# Patient Record
Sex: FEMALE | Race: WHITE | HISPANIC OR LATINO | Employment: FULL TIME | URBAN - METROPOLITAN AREA
[De-identification: names, ages, dates, MRNs, and addresses within clinical notes are randomized per-mention and may not be internally consistent; named-entity substitution may affect disease eponyms.]

---

## 2017-02-28 ENCOUNTER — GENERIC CONVERSION - ENCOUNTER (OUTPATIENT)
Dept: OTHER | Facility: OTHER | Age: 32
End: 2017-02-28

## 2017-05-08 ENCOUNTER — GENERIC CONVERSION - ENCOUNTER (OUTPATIENT)
Dept: OTHER | Facility: OTHER | Age: 32
End: 2017-05-08

## 2017-05-08 ENCOUNTER — ALLSCRIPTS OFFICE VISIT (OUTPATIENT)
Dept: OTHER | Facility: OTHER | Age: 32
End: 2017-05-08

## 2017-05-08 DIAGNOSIS — E66.01 MORBID (SEVERE) OBESITY DUE TO EXCESS CALORIES (HCC): ICD-10-CM

## 2017-05-08 DIAGNOSIS — F33.2 MAJOR DEPRESSIVE DISORDER, RECURRENT SEVERE WITHOUT PSYCHOTIC FEATURES (HCC): ICD-10-CM

## 2017-05-09 ENCOUNTER — APPOINTMENT (OUTPATIENT)
Dept: LAB | Facility: CLINIC | Age: 32
End: 2017-05-09
Payer: COMMERCIAL

## 2017-05-09 ENCOUNTER — TRANSCRIBE ORDERS (OUTPATIENT)
Dept: LAB | Facility: CLINIC | Age: 32
End: 2017-05-09

## 2017-05-09 DIAGNOSIS — F33.2 MAJOR DEPRESSIVE DISORDER, RECURRENT SEVERE WITHOUT PSYCHOTIC FEATURES (HCC): ICD-10-CM

## 2017-05-09 DIAGNOSIS — E66.01 MORBID (SEVERE) OBESITY DUE TO EXCESS CALORIES (HCC): ICD-10-CM

## 2017-05-09 LAB
ALBUMIN SERPL BCP-MCNC: 3.2 G/DL (ref 3.5–5)
ALP SERPL-CCNC: 63 U/L (ref 46–116)
ALT SERPL W P-5'-P-CCNC: 27 U/L (ref 12–78)
ANION GAP SERPL CALCULATED.3IONS-SCNC: 7 MMOL/L (ref 4–13)
AST SERPL W P-5'-P-CCNC: 14 U/L (ref 5–45)
BILIRUB SERPL-MCNC: 0.4 MG/DL (ref 0.2–1)
BUN SERPL-MCNC: 13 MG/DL (ref 5–25)
CALCIUM SERPL-MCNC: 8.3 MG/DL (ref 8.3–10.1)
CHLORIDE SERPL-SCNC: 104 MMOL/L (ref 100–108)
CHOLEST SERPL-MCNC: 112 MG/DL (ref 50–200)
CO2 SERPL-SCNC: 30 MMOL/L (ref 21–32)
CREAT SERPL-MCNC: 0.69 MG/DL (ref 0.6–1.3)
ERYTHROCYTE [DISTWIDTH] IN BLOOD BY AUTOMATED COUNT: 12.1 % (ref 11.6–15.1)
EST. AVERAGE GLUCOSE BLD GHB EST-MCNC: 97 MG/DL
GFR SERPL CREATININE-BSD FRML MDRD: >60 ML/MIN/1.73SQ M
GLUCOSE P FAST SERPL-MCNC: 90 MG/DL (ref 65–99)
HBA1C MFR BLD: 5 % (ref 4.2–6.3)
HCT VFR BLD AUTO: 39.2 % (ref 34.8–46.1)
HDLC SERPL-MCNC: 47 MG/DL (ref 40–60)
HGB BLD-MCNC: 12.5 G/DL (ref 11.5–15.4)
LDLC SERPL CALC-MCNC: 53 MG/DL (ref 0–100)
MCH RBC QN AUTO: 30.6 PG (ref 26.8–34.3)
MCHC RBC AUTO-ENTMCNC: 31.9 G/DL (ref 31.4–37.4)
MCV RBC AUTO: 96 FL (ref 82–98)
PLATELET # BLD AUTO: 235 THOUSANDS/UL (ref 149–390)
PMV BLD AUTO: 10.1 FL (ref 8.9–12.7)
POTASSIUM SERPL-SCNC: 4.4 MMOL/L (ref 3.5–5.3)
PROT SERPL-MCNC: 7.4 G/DL (ref 6.4–8.2)
RBC # BLD AUTO: 4.09 MILLION/UL (ref 3.81–5.12)
SODIUM SERPL-SCNC: 141 MMOL/L (ref 136–145)
TRIGL SERPL-MCNC: 62 MG/DL
TSH SERPL DL<=0.05 MIU/L-ACNC: 0.75 UIU/ML (ref 0.36–3.74)
WBC # BLD AUTO: 7.68 THOUSAND/UL (ref 4.31–10.16)

## 2017-05-09 PROCEDURE — 80053 COMPREHEN METABOLIC PANEL: CPT

## 2017-05-09 PROCEDURE — 36415 COLL VENOUS BLD VENIPUNCTURE: CPT

## 2017-05-09 PROCEDURE — 83036 HEMOGLOBIN GLYCOSYLATED A1C: CPT

## 2017-05-09 PROCEDURE — 85027 COMPLETE CBC AUTOMATED: CPT

## 2017-05-09 PROCEDURE — 84443 ASSAY THYROID STIM HORMONE: CPT

## 2017-05-09 PROCEDURE — 80061 LIPID PANEL: CPT

## 2017-06-06 ENCOUNTER — GENERIC CONVERSION - ENCOUNTER (OUTPATIENT)
Dept: OTHER | Facility: OTHER | Age: 32
End: 2017-06-06

## 2017-06-13 ENCOUNTER — GENERIC CONVERSION - ENCOUNTER (OUTPATIENT)
Dept: OTHER | Facility: OTHER | Age: 32
End: 2017-06-13

## 2017-06-13 ENCOUNTER — ALLSCRIPTS OFFICE VISIT (OUTPATIENT)
Dept: OTHER | Facility: OTHER | Age: 32
End: 2017-06-13

## 2017-06-16 ENCOUNTER — HOSPITAL ENCOUNTER (EMERGENCY)
Facility: HOSPITAL | Age: 32
Discharge: HOME | End: 2017-06-16
Attending: EMERGENCY MEDICINE | Admitting: EMERGENCY MEDICINE
Payer: COMMERCIAL

## 2017-06-16 ENCOUNTER — APPOINTMENT (EMERGENCY)
Dept: RADIOLOGY | Facility: HOSPITAL | Age: 32
End: 2017-06-16
Payer: COMMERCIAL

## 2017-06-16 ENCOUNTER — APPOINTMENT (EMERGENCY)
Dept: CT IMAGING | Facility: HOSPITAL | Age: 32
End: 2017-06-16
Payer: COMMERCIAL

## 2017-06-16 VITALS
TEMPERATURE: 98.2 F | OXYGEN SATURATION: 98 % | BODY MASS INDEX: 72.28 KG/M2 | SYSTOLIC BLOOD PRESSURE: 125 MMHG | DIASTOLIC BLOOD PRESSURE: 59 MMHG | RESPIRATION RATE: 16 BRPM | WEIGHT: 293 LBS | HEART RATE: 95 BPM

## 2017-06-16 DIAGNOSIS — J45.901 ASTHMA EXACERBATION: Primary | ICD-10-CM

## 2017-06-16 LAB
ALBUMIN SERPL BCP-MCNC: 3.4 G/DL (ref 3.5–5)
ALP SERPL-CCNC: 63 U/L (ref 46–116)
ALT SERPL W P-5'-P-CCNC: 31 U/L (ref 12–78)
ANION GAP SERPL CALCULATED.3IONS-SCNC: 10 MMOL/L (ref 4–13)
AST SERPL W P-5'-P-CCNC: 15 U/L (ref 5–45)
ATRIAL RATE: 89 BPM
BASOPHILS # BLD AUTO: 0.02 THOUSANDS/ΜL (ref 0–0.1)
BASOPHILS NFR BLD AUTO: 0 % (ref 0–1)
BILIRUB SERPL-MCNC: 0.5 MG/DL (ref 0.2–1)
BUN SERPL-MCNC: 9 MG/DL (ref 5–25)
CALCIUM SERPL-MCNC: 8.7 MG/DL (ref 8.3–10.1)
CHLORIDE SERPL-SCNC: 102 MMOL/L (ref 100–108)
CLARITY, POC: CLEAR
CO2 SERPL-SCNC: 28 MMOL/L (ref 21–32)
COLOR, POC: YELLOW
CREAT SERPL-MCNC: 0.76 MG/DL (ref 0.6–1.3)
EOSINOPHIL # BLD AUTO: 0.24 THOUSAND/ΜL (ref 0–0.61)
EOSINOPHIL NFR BLD AUTO: 2 % (ref 0–6)
ERYTHROCYTE [DISTWIDTH] IN BLOOD BY AUTOMATED COUNT: 12.2 % (ref 11.6–15.1)
EXT BILIRUBIN, UA: NEGATIVE
EXT BLOOD URINE: NEGATIVE
EXT GLUCOSE, UA: NEGATIVE
EXT KETONES: NEGATIVE
EXT NITRITE, UA: NEGATIVE
EXT PH, UA: 7.5
EXT PROTEIN, UA: ABNORMAL
EXT SPECIFIC GRAVITY, UA: 1.01
EXT UROBILINOGEN: 0.2
GFR SERPL CREATININE-BSD FRML MDRD: >60 ML/MIN/1.73SQ M
GLUCOSE SERPL-MCNC: 93 MG/DL (ref 65–140)
HCT VFR BLD AUTO: 39.8 % (ref 34.8–46.1)
HGB BLD-MCNC: 12.8 G/DL (ref 11.5–15.4)
LIPASE SERPL-CCNC: 99 U/L (ref 73–393)
LYMPHOCYTES # BLD AUTO: 1.88 THOUSANDS/ΜL (ref 0.6–4.47)
LYMPHOCYTES NFR BLD AUTO: 16 % (ref 14–44)
MCH RBC QN AUTO: 30.6 PG (ref 26.8–34.3)
MCHC RBC AUTO-ENTMCNC: 32.2 G/DL (ref 31.4–37.4)
MCV RBC AUTO: 95 FL (ref 82–98)
MONOCYTES # BLD AUTO: 0.54 THOUSAND/ΜL (ref 0.17–1.22)
MONOCYTES NFR BLD AUTO: 5 % (ref 4–12)
NEUTROPHILS # BLD AUTO: 8.92 THOUSANDS/ΜL (ref 1.85–7.62)
NEUTS SEG NFR BLD AUTO: 77 % (ref 43–75)
P AXIS: 57 DEGREES
PLATELET # BLD AUTO: 246 THOUSANDS/UL (ref 149–390)
PMV BLD AUTO: 9.8 FL (ref 8.9–12.7)
POTASSIUM SERPL-SCNC: 3.9 MMOL/L (ref 3.5–5.3)
PR INTERVAL: 132 MS
PROT SERPL-MCNC: 8.4 G/DL (ref 6.4–8.2)
QRS AXIS: 39 DEGREES
QRSD INTERVAL: 84 MS
QT INTERVAL: 368 MS
QTC INTERVAL: 447 MS
RBC # BLD AUTO: 4.18 MILLION/UL (ref 3.81–5.12)
SODIUM SERPL-SCNC: 140 MMOL/L (ref 136–145)
T WAVE AXIS: 23 DEGREES
TROPONIN I SERPL-MCNC: <0.02 NG/ML
VENTRICULAR RATE: 89 BPM
WBC # BLD AUTO: 11.6 THOUSAND/UL (ref 4.31–10.16)
WBC # BLD EST: ABNORMAL 10*3/UL

## 2017-06-16 PROCEDURE — 94760 N-INVAS EAR/PLS OXIMETRY 1: CPT

## 2017-06-16 PROCEDURE — 84484 ASSAY OF TROPONIN QUANT: CPT | Performed by: PHYSICIAN ASSISTANT

## 2017-06-16 PROCEDURE — 83690 ASSAY OF LIPASE: CPT | Performed by: PHYSICIAN ASSISTANT

## 2017-06-16 PROCEDURE — 80053 COMPREHEN METABOLIC PANEL: CPT | Performed by: PHYSICIAN ASSISTANT

## 2017-06-16 PROCEDURE — 96361 HYDRATE IV INFUSION ADD-ON: CPT

## 2017-06-16 PROCEDURE — 96375 TX/PRO/DX INJ NEW DRUG ADDON: CPT

## 2017-06-16 PROCEDURE — 81002 URINALYSIS NONAUTO W/O SCOPE: CPT | Performed by: PHYSICIAN ASSISTANT

## 2017-06-16 PROCEDURE — 85025 COMPLETE CBC W/AUTO DIFF WBC: CPT | Performed by: PHYSICIAN ASSISTANT

## 2017-06-16 PROCEDURE — 94640 AIRWAY INHALATION TREATMENT: CPT

## 2017-06-16 PROCEDURE — 99285 EMERGENCY DEPT VISIT HI MDM: CPT

## 2017-06-16 PROCEDURE — 93005 ELECTROCARDIOGRAM TRACING: CPT | Performed by: PHYSICIAN ASSISTANT

## 2017-06-16 PROCEDURE — 71020 HB CHEST X-RAY 2VW FRONTAL&LATL: CPT

## 2017-06-16 PROCEDURE — 96365 THER/PROPH/DIAG IV INF INIT: CPT

## 2017-06-16 PROCEDURE — 94644 CONT INHLJ TX 1ST HOUR: CPT

## 2017-06-16 PROCEDURE — 71275 CT ANGIOGRAPHY CHEST: CPT

## 2017-06-16 PROCEDURE — 36415 COLL VENOUS BLD VENIPUNCTURE: CPT | Performed by: PHYSICIAN ASSISTANT

## 2017-06-16 RX ORDER — PREDNISONE 50 MG/1
50 TABLET ORAL DAILY
Qty: 4 TABLET | Refills: 0 | Status: SHIPPED | OUTPATIENT
Start: 2017-06-17 | End: 2017-10-01 | Stop reason: ALTCHOICE

## 2017-06-16 RX ORDER — ALBUTEROL SULFATE 2.5 MG/3ML
10 SOLUTION RESPIRATORY (INHALATION) ONCE
Status: COMPLETED | OUTPATIENT
Start: 2017-06-16 | End: 2017-06-16

## 2017-06-16 RX ORDER — ALBUTEROL SULFATE 1.25 MG/3ML
1 SOLUTION RESPIRATORY (INHALATION) EVERY 6 HOURS PRN
Qty: 75 ML | Refills: 0 | Status: SHIPPED | OUTPATIENT
Start: 2017-06-16 | End: 2018-01-25 | Stop reason: SINTOL

## 2017-06-16 RX ORDER — ACETAMINOPHEN 325 MG/1
975 TABLET ORAL ONCE
Status: COMPLETED | OUTPATIENT
Start: 2017-06-16 | End: 2017-06-16

## 2017-06-16 RX ORDER — METHYLPREDNISOLONE SODIUM SUCCINATE 125 MG/2ML
125 INJECTION, POWDER, LYOPHILIZED, FOR SOLUTION INTRAMUSCULAR; INTRAVENOUS ONCE
Status: COMPLETED | OUTPATIENT
Start: 2017-06-16 | End: 2017-06-16

## 2017-06-16 RX ORDER — MORPHINE SULFATE 2 MG/ML
2 INJECTION, SOLUTION INTRAMUSCULAR; INTRAVENOUS ONCE
Status: COMPLETED | OUTPATIENT
Start: 2017-06-16 | End: 2017-06-16

## 2017-06-16 RX ADMIN — MORPHINE SULFATE 2 MG: 2 INJECTION, SOLUTION INTRAMUSCULAR; INTRAVENOUS at 15:11

## 2017-06-16 RX ADMIN — IPRATROPIUM BROMIDE 0.5 MG: 0.5 SOLUTION RESPIRATORY (INHALATION) at 20:00

## 2017-06-16 RX ADMIN — IPRATROPIUM BROMIDE 0.5 MG: 0.5 SOLUTION RESPIRATORY (INHALATION) at 15:03

## 2017-06-16 RX ADMIN — SODIUM CHLORIDE 1000 ML: 0.9 INJECTION, SOLUTION INTRAVENOUS at 15:14

## 2017-06-16 RX ADMIN — ALBUTEROL SULFATE 2.5 MG: 2.5 SOLUTION RESPIRATORY (INHALATION) at 15:03

## 2017-06-16 RX ADMIN — IOHEXOL 100 ML: 350 INJECTION, SOLUTION INTRAVENOUS at 18:37

## 2017-06-16 RX ADMIN — ACETAMINOPHEN 975 MG: 325 TABLET, FILM COATED ORAL at 15:06

## 2017-06-16 RX ADMIN — MAGNESIUM SULFATE IN DEXTROSE 1 G: 10 INJECTION, SOLUTION INTRAVENOUS at 16:37

## 2017-06-16 RX ADMIN — IPRATROPIUM BROMIDE 0.5 MG: 0.5 SOLUTION RESPIRATORY (INHALATION) at 15:20

## 2017-06-16 RX ADMIN — ALBUTEROL SULFATE 10 MG: 2.5 SOLUTION RESPIRATORY (INHALATION) at 16:14

## 2017-06-16 RX ADMIN — METHYLPREDNISOLONE SODIUM SUCCINATE 125 MG: 125 INJECTION, POWDER, FOR SOLUTION INTRAMUSCULAR; INTRAVENOUS at 16:32

## 2017-06-27 ENCOUNTER — ALLSCRIPTS OFFICE VISIT (OUTPATIENT)
Dept: OTHER | Facility: OTHER | Age: 32
End: 2017-06-27

## 2017-07-17 ENCOUNTER — GENERIC CONVERSION - ENCOUNTER (OUTPATIENT)
Dept: OTHER | Facility: OTHER | Age: 32
End: 2017-07-17

## 2017-07-18 ENCOUNTER — ALLSCRIPTS OFFICE VISIT (OUTPATIENT)
Dept: OTHER | Facility: OTHER | Age: 32
End: 2017-07-18

## 2017-08-04 ENCOUNTER — GENERIC CONVERSION - ENCOUNTER (OUTPATIENT)
Dept: OTHER | Facility: OTHER | Age: 32
End: 2017-08-04

## 2017-08-07 ENCOUNTER — ALLSCRIPTS OFFICE VISIT (OUTPATIENT)
Dept: OTHER | Facility: OTHER | Age: 32
End: 2017-08-07

## 2017-08-07 ENCOUNTER — GENERIC CONVERSION - ENCOUNTER (OUTPATIENT)
Dept: OTHER | Facility: OTHER | Age: 32
End: 2017-08-07

## 2017-08-19 ENCOUNTER — HOSPITAL ENCOUNTER (EMERGENCY)
Facility: HOSPITAL | Age: 32
Discharge: HOME | End: 2017-08-19
Attending: EMERGENCY MEDICINE | Admitting: EMERGENCY MEDICINE
Payer: COMMERCIAL

## 2017-08-19 VITALS
HEART RATE: 72 BPM | BODY MASS INDEX: 70.01 KG/M2 | DIASTOLIC BLOOD PRESSURE: 66 MMHG | OXYGEN SATURATION: 100 % | WEIGHT: 293 LBS | TEMPERATURE: 98.5 F | SYSTOLIC BLOOD PRESSURE: 139 MMHG | RESPIRATION RATE: 16 BRPM

## 2017-08-19 DIAGNOSIS — N39.0 ACUTE UTI: Primary | ICD-10-CM

## 2017-08-19 LAB
CLARITY, POC: NORMAL
COLOR, POC: YELLOW
EXT BILIRUBIN, UA: NORMAL
EXT BLOOD URINE: NORMAL
EXT GLUCOSE, UA: NORMAL
EXT KETONES: NORMAL
EXT NITRITE, UA: NORMAL
EXT PH, UA: 7
EXT PROTEIN, UA: NORMAL
EXT SPECIFIC GRAVITY, UA: 1.01
EXT UROBILINOGEN: NORMAL
HCG UR QL: NORMAL
WBC # BLD EST: NORMAL 10*3/UL

## 2017-08-19 PROCEDURE — 99283 EMERGENCY DEPT VISIT LOW MDM: CPT

## 2017-08-19 PROCEDURE — 81025 URINE PREGNANCY TEST: CPT | Performed by: EMERGENCY MEDICINE

## 2017-08-19 PROCEDURE — 81002 URINALYSIS NONAUTO W/O SCOPE: CPT | Performed by: EMERGENCY MEDICINE

## 2017-08-19 PROCEDURE — 87186 SC STD MICRODIL/AGAR DIL: CPT | Performed by: EMERGENCY MEDICINE

## 2017-08-19 PROCEDURE — 87086 URINE CULTURE/COLONY COUNT: CPT | Performed by: EMERGENCY MEDICINE

## 2017-08-19 PROCEDURE — 87077 CULTURE AEROBIC IDENTIFY: CPT | Performed by: EMERGENCY MEDICINE

## 2017-08-19 RX ORDER — PHENAZOPYRIDINE HYDROCHLORIDE 100 MG/1
200 TABLET, FILM COATED ORAL ONCE
Status: COMPLETED | OUTPATIENT
Start: 2017-08-19 | End: 2017-08-19

## 2017-08-19 RX ORDER — CEPHALEXIN 250 MG/1
500 CAPSULE ORAL ONCE
Status: COMPLETED | OUTPATIENT
Start: 2017-08-19 | End: 2017-08-19

## 2017-08-19 RX ORDER — CEPHALEXIN 500 MG/1
500 CAPSULE ORAL 2 TIMES DAILY
Qty: 14 CAPSULE | Refills: 0 | Status: SHIPPED | OUTPATIENT
Start: 2017-08-19 | End: 2017-08-26

## 2017-08-19 RX ORDER — PHENAZOPYRIDINE HYDROCHLORIDE 200 MG/1
200 TABLET, FILM COATED ORAL 3 TIMES DAILY
Qty: 6 TABLET | Refills: 0 | Status: SHIPPED | OUTPATIENT
Start: 2017-08-19 | End: 2017-10-01 | Stop reason: ALTCHOICE

## 2017-08-19 RX ADMIN — CEPHALEXIN 500 MG: 250 CAPSULE ORAL at 01:13

## 2017-08-19 RX ADMIN — PHENAZOPYRIDINE HYDROCHLORIDE 200 MG: 100 TABLET ORAL at 01:13

## 2017-08-21 LAB — BACTERIA UR CULT: NORMAL

## 2017-09-01 ENCOUNTER — GENERIC CONVERSION - ENCOUNTER (OUTPATIENT)
Dept: OTHER | Facility: OTHER | Age: 32
End: 2017-09-01

## 2017-09-05 ENCOUNTER — GENERIC CONVERSION - ENCOUNTER (OUTPATIENT)
Dept: OTHER | Facility: OTHER | Age: 32
End: 2017-09-05

## 2017-09-19 ENCOUNTER — GENERIC CONVERSION - ENCOUNTER (OUTPATIENT)
Dept: OTHER | Facility: OTHER | Age: 32
End: 2017-09-19

## 2017-10-01 ENCOUNTER — HOSPITAL ENCOUNTER (EMERGENCY)
Facility: HOSPITAL | Age: 32
Discharge: HOME | End: 2017-10-01
Attending: EMERGENCY MEDICINE | Admitting: EMERGENCY MEDICINE
Payer: COMMERCIAL

## 2017-10-01 VITALS
DIASTOLIC BLOOD PRESSURE: 71 MMHG | TEMPERATURE: 98.5 F | WEIGHT: 293 LBS | HEART RATE: 80 BPM | HEIGHT: 64 IN | BODY MASS INDEX: 50.02 KG/M2 | SYSTOLIC BLOOD PRESSURE: 139 MMHG | RESPIRATION RATE: 16 BRPM | OXYGEN SATURATION: 97 %

## 2017-10-01 DIAGNOSIS — L03.90 CELLULITIS: Primary | ICD-10-CM

## 2017-10-01 PROCEDURE — 99283 EMERGENCY DEPT VISIT LOW MDM: CPT

## 2017-10-01 RX ORDER — FLUOXETINE HYDROCHLORIDE 40 MG/1
40 CAPSULE ORAL DAILY
COMMUNITY
End: 2018-01-25 | Stop reason: SINTOL

## 2017-10-01 RX ORDER — CEPHALEXIN 250 MG/1
500 CAPSULE ORAL 4 TIMES DAILY
Qty: 80 CAPSULE | Refills: 0 | Status: SHIPPED | OUTPATIENT
Start: 2017-10-01 | End: 2017-10-11

## 2017-10-01 RX ORDER — CEPHALEXIN 250 MG/1
500 CAPSULE ORAL ONCE
Status: COMPLETED | OUTPATIENT
Start: 2017-10-01 | End: 2017-10-01

## 2017-10-01 RX ADMIN — CEPHALEXIN 500 MG: 250 CAPSULE ORAL at 15:15

## 2017-10-01 NOTE — ED PROVIDER NOTES
History  Chief Complaint   Patient presents with    Nasal Swelling     Pt presents to ED due to c/o nasal swelling, pain and erythema starting Thursday  Denies using pore strips, sprays, etc      Patient is a 20-year-old female who reports to the emergency department with swelling to the tip of her nose and redness that started on Thursday  No systemic fevers, chills, sweats  The pain also extends to the inside of her nose  No abnormalities on the inside of the nose but the tip does have some redness and swelling  This is either cellulitis or a pimple that is deep  Will treat his cellulitis and have the patient follow up with her primary care doctor  Patient has been given strict return precautions  Otherwise normal HEENT exam             Prior to Admission Medications   Prescriptions Last Dose Informant Patient Reported? Taking? FLUoxetine (PROzac) 40 MG capsule   Yes Yes   Sig: Take 40 mg by mouth daily   Suvorexant (BELSOMRA) 20 MG TABS   Yes Yes   Sig: Take 20 mg by mouth daily as needed   albuterol (ACCUNEB) 1 25 MG/3ML nebulizer solution   No No   Sig: Take 3 mL by nebulization every 6 (six) hours as needed for wheezing   albuterol (PROVENTIL HFA,VENTOLIN HFA) 90 mcg/act inhaler   Yes No   Sig: Inhale 2 puffs every 6 (six) hours as needed for wheezing  fexofenadine (ALLEGRA) 30 MG tablet   Yes No   Sig: Take 30 mg by mouth 2 (two) times a day  Facility-Administered Medications: None       Past Medical History:   Diagnosis Date    Asthma     Chronic pain     Depression     Insomnia     Seasonal allergies        Past Surgical History:   Procedure Laterality Date    NO PAST SURGERIES         History reviewed  No pertinent family history  I have reviewed and agree with the history as documented      Social History   Substance Use Topics    Smoking status: Never Smoker    Smokeless tobacco: Never Used    Alcohol use No        Review of Systems   Constitutional: Negative for chills and fever    HENT: Negative for ear discharge and facial swelling  Nose pain   Respiratory: Negative for chest tightness, shortness of breath and wheezing  Cardiovascular: Negative for chest pain and palpitations  Gastrointestinal: Negative for abdominal pain, diarrhea and vomiting  Genitourinary: Negative for dysuria and hematuria  Musculoskeletal: Negative for arthralgias and neck stiffness  Skin: Positive for color change  Negative for rash  Allergic/Immunologic: Negative  Neurological: Negative for tremors, seizures and syncope  Psychiatric/Behavioral: Negative for hallucinations and suicidal ideas  All other systems reviewed and are negative  Physical Exam  ED Triage Vitals [10/01/17 1303]   Temperature Pulse Respirations Blood Pressure SpO2   98 5 °F (36 9 °C) 84 16 157/80 96 %      Temp Source Heart Rate Source Patient Position - Orthostatic VS BP Location FiO2 (%)   Oral Monitor Sitting Right arm --      Pain Score       4           Physical Exam   Constitutional: She is oriented to person, place, and time  She appears well-developed and well-nourished  HENT:   Head: Normocephalic and atraumatic  Right Ear: External ear normal    Left Ear: External ear normal    Eyes: Conjunctivae and EOM are normal    Neck: Normal range of motion  Neck supple  No JVD present  No tracheal deviation present  Cardiovascular: Normal rate, regular rhythm and normal heart sounds  Pulmonary/Chest: Effort normal  No respiratory distress  She has no wheezes  She has no rales  Abdominal: Soft  Bowel sounds are normal  There is no tenderness  There is no rebound and no guarding  Musculoskeletal: She exhibits no edema or tenderness  Neurological: She is alert and oriented to person, place, and time  Skin: Skin is warm and dry  No rash noted  No erythema  Psychiatric: She has a normal mood and affect  Thought content normal    Nursing note and vitals reviewed        ED Medications  Medications   cephalexin (KEFLEX) capsule 500 mg (500 mg Oral Given 10/1/17 1515)       Diagnostic Studies  Labs Reviewed - No data to display    No orders to display       Procedures  Procedures      Phone Contacts  ED Phone Contact    ED Course  ED Course                   The patient (and any family present) verbalized understanding of the discharge instructions and warnings that would necessitate return to the Emergency Department  All questions were answered prior to discharge  MDM  CritCare Time    Disposition  Final diagnoses:   Cellulitis     ED Disposition     ED Disposition Condition Comment    Discharge  [address] discharge to home/self care  Condition at discharge: Good        Follow-up Information     Follow up With Specialties Details Why 2605 Tevin Burciaga, DO Internal Medicine In 1 day  [address]  [address] [facility], [address] [place] [address],[address]  [phone]          Discharge Medication List as of 10/1/2017  2:36 PM      START taking these medications    Details   cephalexin (KEFLEX) 250 mg capsule Take 2 capsules by mouth 4 (four) times a day for 10 days, Starting Sun 10/1/2017, Until Wed 10/11/2017, Print         CONTINUE these medications which have NOT CHANGED    Details   FLUoxetine (PROzac) 40 MG capsule Take 40 mg by mouth daily, Historical Med      Suvorexant (BELSOMRA) 20 MG TABS Take 20 mg by mouth daily as needed, Historical Med      albuterol (ACCUNEB) 1 25 MG/3ML nebulizer solution Take 3 mL by nebulization every 6 (six) hours as needed for wheezing, Starting Fri 6/16/2017, Print      albuterol (PROVENTIL HFA,VENTOLIN HFA) 90 mcg/act inhaler Inhale 2 puffs every 6 (six) hours as needed for wheezing , Until Discontinued, Historical Med      fexofenadine (ALLEGRA) 30 MG tablet Take 30 mg by mouth 2 (two) times a day , Until Discontinued, Historical Med           No discharge procedures on file      ED Provider  Electronically Signed by Aleida Esquivel MD  10/01/17 8199

## 2017-10-01 NOTE — DISCHARGE INSTRUCTIONS
Cellulitis, Ambulatory Care   GENERAL INFORMATION:   Cellulitis  is a skin infection caused by bacteria  Common symptoms include the following:   · Fever    · A red, warm, swollen area on your skin    · Pain when the area is touched    · Bumps or blisters (abscess) that may drain pus    · Bumpy, raised skin that feels like an orange peel  Seek immediate care for the following symptoms:   · An increase in pain, redness, warmth, and size    · Red streaks coming from the infected area    · A thin, gray-brown discharge coming from your infected skin area    · A crackling under your skin when you touch it    · Purple dots or bumps on your skin, or bleeding under your skin    · New swelling and pain in your legs    · Sudden trouble breathing or chest pain  Treatment for cellulitis  may include medicines to treat the bacterial infection or decrease pain  The infection may need to be cleaned out  Damaged, dead, or infected tissue may need to be cut away to help your wound heal   Manage your symptoms:   · Elevate your wound above the level of your heart  as often as you can  This will help decrease swelling and pain  Prop your wound on pillows or blankets to keep it elevated comfortably  · Clean your wound as directed  You may need to wash the wound with soap and water  Look for signs of infection  · Wear pressure stockings as directed  The stockings are tight and put pressure on your legs  This improves blood flow and decreases swelling  Prevent cellulitis:   · Wash your hands often  Use soap and water  Wash your hands after you use the bathroom, change a child's diapers, or sneeze  Wash your hands before you prepare or eat food  Use lotion to prevent dry, cracked skin  · Do not share personal items, such as towels, clothing, and razors  · Clean exercise equipment  with germ-killing  before and after you use it    Follow up with your healthcare provider as directed:  Write down your questions so you remember to ask them during your visits  CARE AGREEMENT:   You have the right to help plan your care  Learn about your health condition and how it may be treated  Discuss treatment options with your caregivers to decide what care you want to receive  You always have the right to refuse treatment  The above information is an  only  It is not intended as medical advice for individual conditions or treatments  Talk to your doctor, nurse or pharmacist before following any medical regimen to see if it is safe and effective for you  © 2014 [address] is for End User's use only and may not be sold, redistributed or otherwise used for commercial purposes  All illustrations and images included in CareNotes® are the copyrighted property of [employer]  or [employer]

## 2017-10-05 ENCOUNTER — GENERIC CONVERSION - ENCOUNTER (OUTPATIENT)
Dept: OTHER | Facility: OTHER | Age: 32
End: 2017-10-05

## 2017-10-17 ENCOUNTER — GENERIC CONVERSION - ENCOUNTER (OUTPATIENT)
Dept: OTHER | Facility: OTHER | Age: 32
End: 2017-10-17

## 2017-10-23 ENCOUNTER — GENERIC CONVERSION - ENCOUNTER (OUTPATIENT)
Dept: OTHER | Facility: OTHER | Age: 32
End: 2017-10-23

## 2017-12-15 ENCOUNTER — GENERIC CONVERSION - ENCOUNTER (OUTPATIENT)
Dept: OTHER | Facility: OTHER | Age: 32
End: 2017-12-15

## 2018-01-09 NOTE — MISCELLANEOUS
Provider Comments  Provider Comments:     Dear Laura Arechiga had a scheduled appointment at our office today but were unable to keep  We attempted to call you back but were unable to reach you  Please give our office a call if you wish to reschedule  Sincerely,     [facility] [place]          Signatures   Electronically signed by : Queen Melinda, ; Apr 29 2016 10:20AM EST                       (Author)    Electronically signed by :  GERBER Fowler ; May  5 2016  1:26PM EST

## 2018-01-09 NOTE — MISCELLANEOUS
Provider Comments  Provider Comments:   Dear Mina Pennington,    We called you about your scheduled appointment for today but were unable to reach you  It is very important that you follow up with us so that we can assess your physical and nutritional safety  Please call our office at [phone] to reschedule your appointment       Sincerely,     [facility]        Signatures   Electronically signed by : Satish Marcelo, ; May 23 2016 10:45AM EST                       (Author)    Electronically signed by : GERBER Perez ; May 23 2016 11:50AM EST                       (Co-author)

## 2018-01-10 NOTE — PSYCH
Behavioral Health Outpatient Intake    Referred By: DR Garret Ricardo  Intake Questions: there are no developmental disabilities  the patient does not have a hearing impairment  the patient does not have an ICM or CTT  patient is not taking injectable psychiatric medications  Employment: The patient is employed full time at [employer]  Emergency Contact Information:   Emergency Contact: WILLIAM JOHNS   Relationship to Patient: MOTHER   Phone Number: [phone]   Previous Psychiatric Treatment: She has previously been seen by a psychiatrist  2-3 YRS  She has previously been seen by a therapist  2-3 YRS   History: no  service  She has not had combat service  Insurance Subscriber: Macarena Prajapati   Primary Insurance: [employer]   ID number: [id number]   Group number: [id number]         Presenting Problem (in patient's words): DEPRESSION, ANXIETY  Substance Abuse: NONE  Previous Treatment: The patient has not been seen here in the past      Accepted as Patient   SIDRA JONES 6/27/17 4PM     Primary Care Physician: DR Garret Ricardo       Signatures   Electronically signed by : Marlyn Phelan, ; May  8 2017  2:23PM EST                       (Author)

## 2018-01-10 NOTE — PROGRESS NOTES
Assessment    1  Left lumbar radiculopathy (724 4) (M54 16)   2  Asthma (837 90) (J45 909)    Plan  Asthma    · Follow-up visit in 1 year Evaluation and Treatment  Wellness  Status: Hold For - Scheduling   Requested for: [id number]  Left lumbar radiculopathy    · TraMADol HCl - 50 MG Oral Tablet; TAKE 1 TABLET QHS PRN    Discussion/Summary  Discussion Summary:     1  left lumbar radiculopathy - suspect sciatica  She is scheduled for MRI per her chiropractor  Discussed pain regimen to include gabapentin, medrol dose kari and Tramadol  She prefers Tramadol, to be taken for severe pain  If not effective, she will consider steroid taper  Follow up as needed  She will forward a copy of her mri results  2  intermittent asthma - stable  Her triggers include pollen, smoke, cold weather and exercise  She uses ventolin inh prn  She is up to date with her influenza vaccine  3  morbid obesity - BMI decreased to 63 with diet and exercise  She has no known complications thus far  She has been [facility] to [facility]  Chief Complaint  Chief Complaint Chronic Condition [facility]: Patient is here today for follow up of chronic conditions described in HPI  History of Present Illness  HPI: 30yo female with morbid obesity and asthma here for follow up care and to discuss labs  She has lost about 15pounds over two weeks by modifying her diet and increasing her physical activity  She has been eating more fish and chicken and stopped drinking soda and juice  She is drinking a lot of water instead  She has returned to the gym and increased her walking, doing gabriel, no weightlifting  She has had radiating pain in her left buttock down to her left leg since  Pain is located in her left buttock as if she is being kicked, initially as warm sensation then goes down to her left leg  She has not had this pain before  She is scheduled for MRI tomorrow  She is seeing a chiropractor Dr Mario Brine   She has taken ibuprofen 800mg twice daily without relief  She denies urinary or bowel changes or fever  States she is going to [employer] on wednesday  Asthma (Follow-Up): The patient is being seen for a routine clinic follow-up of asthma  The patient's long-term asthma pattern has been classified as intermittent  The patient states she has been doing well with her asthma control since the last visit  Asthma Control: Well controlled  Interval Symptoms: The patient is currently asymptomatic  Interval Triggers: cold weather, exercise, pollen exposure and smoke  Medications: a short acting beta agonist agent the patient is adherent with her medication regimen  She denies medication side effects  Review of Systems  Complete-Female:   Constitutional: recent weight loss, but no fever  Cardiovascular: No complaints of slow heart rate, no fast heart rate, no chest pain, no palpitations, no leg claudication, no lower extremity edema  Respiratory: as noted in HPI  Gastrointestinal: No complaints of abdominal pain, no constipation, no nausea or vomiting, no diarrhea, no bloody stools  Genitourinary: No complaints of dysuria, no incontinence, no pelvic pain, no dysmenorrhea, no vaginal discharge or bleeding  Musculoskeletal: as noted in HPI  Neurological: numbness and tingling  Active Problems    1  Asthma (493 90) (J45 909)   2  Edema (782 3) (R60 9)   3  Headache (784 0) (R51)   4  Inflammatory disease of cervix, vagina, and vulva (616 9) (N73 9)   5  Morbid or severe obesity due to excess calories (278 01) (E66 01)   6  Palpitations (785 1) (R00 2)   7  Screening for cardiovascular condition (V81 2) (Z13 6)   8  Screening for depression (V79 0) (Z13 89)   9  Screening for diabetes mellitus (DM) (V77 1) (Z13 1)   10  Screening for thyroid disorder (V77 0) (Z13 29)   11  Shortness of breath (786 05) (R06 02)   12  Varicose Veins Of Lower Extremities (454 9)    Past Medical History    1   History of Obesity surgery status (V45 86) (Z98 84)  Active Problems And Past Medical History Reviewed: The active problems and past medical history were reviewed and updated today  Surgical History    1  History of Incision And Drainage Of Skin Abscess  Surgical History Reviewed: The surgical history was reviewed and updated today  Family History    1  Family history of cardiac disorder (V17 49) (Z82 49)   2  Family history of hypertension (V17 49) (Z82 49)    3  Family history of diabetes mellitus (V18 0) (Z83 3)  Family History Reviewed: The family history was reviewed and updated today  Social History    · Alcohol use (V49 89) (F10 99)   · Former smoker (J86 97) (F37 522)   · Occupation   · Single  Social History Reviewed: The social history was reviewed and is unchanged  Current Meds   1  Ventolin  (90 Base) MCG/ACT Inhalation Aerosol Solution; Therapy: (Recorded:11Jan2016) to Recorded  Medication List Reviewed: The medication list was reviewed and updated today  Allergies    1  No Known Drug Allergies    2  Dust   3  Grass   4  Pollen   5  Ragweed    Vitals  Vital Signs [Data Includes: Current Encounter]    Recorded: [id number] 02:09PM   Temperature 98 1 F   Heart Rate 59   Respiration 14   Systolic 685   Diastolic 68   Height 5 ft 5 in   Weight 383 lb 6 08 oz   BMI Calculated 63 8   BSA Calculated 2 61   O2 Saturation 99     Physical Exam    Constitutional   General appearance: No acute distress, well appearing and well nourished  Pulmonary   Respiratory effort: No increased work of breathing or signs of respiratory distress  Auscultation of lungs: Clear to auscultation  Cardiovascular   Auscultation of heart: Normal rate and rhythm, normal S1 and S2, without murmurs  Musculoskeletal   Gait and station: Normal   motor strength equal BLE     Psychiatric   Orientation to person, place, and time: Normal          Results/Data  Results   (1) HEMOGLOBIN A1C [id number] 07:50PM [facility] TW Order Number: [id number]      5 7-6 4% impaired fasting glucose  >=6 5% diagnosis of diabetes    Falsely low levels are seen in conditions linked to short RBC life span-  hemolytic anemia, and splenomegaly  Falsely elevated levels are seen in situations where there is an increased production of RBC- receipt of erythropoietin or blood transfusions  Adopted from ADA-Clinical Practice Recommendations     Test Name Result Flag Reference   HEMOGLOBIN A1C 4 7 %  4 0-5 6   EST  AVG  GLUCOSE 88 mg/dl       (1) TSH WITH FT4 REFLEX 12Jan2016 02:18PM [facility]   TW Order Number: [id number]    Patients undergoing fluorescein dye angiography may retain small amounts of fluorescein in the body for 48-72 hours post procedure  Samples containing fluorescein can produce falsely depressed TSH values  If the patient had this procedure,a specimen should be resubmitted post fluorescein clearance  The recommended reference ranges for TSH during pregnancy are as follows:  First trimester 0 1 to 2 5 uIU/mL  Second trimester  0 2 to 3 0 uIU/mL  Third trimester 0 3 to 3 0 uIU/m     Test Name Result Flag Reference   TSH 1 276 uIU/mL  0 358-3 740     (1) COMPREHENSIVE METABOLIC PANEL [id number] 22:05BN Mina Morillo Order Number: [id number]      National Kidney Disease Education Program recommendations are as follows:  GFR calculation is accurate only with a steady state creatinine  Chronic Kidney disease less than 60 ml/min/1 73 sq  meters  Kidney failure less than 15 ml/min/1 73 sq  meters  Test Name Result Flag Reference   GLUCOSE,RANDM 76 mg/dL     If the patient is fasting, the ADA then defines impaired fasting glucose as > 100 mg/dL and diabetes as > or equal to 123 mg/dL     SODIUM 138 mmol/L  136-145   POTASSIUM 3 9 mmol/L  3 5-5 3   CHLORIDE 102 mmol/L  100-108   CARBON DIOXIDE 28 mmol/L  21-32   ANION GAP (CALC) 8 mmol/L  4-13   BLOOD UREA NITROGEN 14 mg/dL  5-25   CREATININE 0 66 mg/dL  0 60-1 30 Standardized to IDMS reference method   CALCIUM 8 3 mg/dL  8 3-10 1   BILI, TOTAL 0 48 mg/dL  0 20-1 00   ALK PHOSPHATAS 60 U/L     ALT (SGPT) 35 U/L  12-78   AST(SGOT) 23 U/L  5-45   ALBUMIN 3 3 g/dL L 3 5-5 0   TOTAL PROTEIN 7 6 g/dL  6 4-8 2   eGFR Non-African American > ml/min/1 73sq m       (1) URINALYSIS (will reflex a microscopy if leukocytes, occult blood, protein or nitrites are not within normal limits) [id number] 01:50PM Geoffrey Galeazzi    Order Number: [id number]     Test Name Result Flag Reference   COLOR Yellow     CLARITY Clear     SPECIFIC GRAVITY UA 1 020  1 003-1 030   PH UA 5 5  4 5-8 0   LEUKOCYTE ESTERASE UA Negative  Negative   NITRITE UA Negative  Negative   PROTEIN UA Negative mg/dl  Negative, >300   GLUCOSE UA Negative mg/dl  Negative   KETONES UA Negative mg/dl  Negative   UROBILINOGEN UA 0 2 E U /dl  0 2, 1 0 E U /dl   BILIRUBIN UA Negative  Negative   BLOOD UA Negative  Negative     (1) LIPID PANEL FASTING W DIRECT LDL REFLEX 12Jan2016 01:44PM Declan Galeazzi    Order Number: [id number]      Triglyceride:         Normal              <150 mg/dl       Borderline High    150-199 mg/dl       High               200-499 mg/dl       Very High          >499 mg/dl  Cholesterol:         Desirable        <200 mg/dl      Borderline High  200-239 mg/dl      High             >239 mg/dl  HDL Cholesterol:        High    >59 mg/dL      Low     <41 mg/dL  LDL Cholesterol:        Optimal          <100 mg/dl         Near Optimal     100-129 mg/dl        Above Optimal          Borderline High   130-159 mg/dl          High              160-189 mg/dl          Very High        >189 mg/dl  LDL CALCULATED:    This screening LDL is a calculated result  It does not have the accuracy of the Direct Measured LDL in the monitoring of patients with hyperlipidemia and/or statin therapy  Direct Measure LDL (URU630) must be ordered separately in these patients       Test Name Result Flag Reference   CHOLESTEROL 114 mg/dL     LDL CHOLESTEROL CALCULATED 48 mg/dL  0-100   TRIGLYCERIDES 94 mg/dL  <=150   HDL,DIRECT 47 mg/dL  40-60     (1) CBC/ PLT (NO DIFF) [id number] 01:08PM Jimenez Hall   TW Order Number: [id number]     Order Number: [id number]     Test Name Result Flag Reference   HEMATOCRIT 38 0 %  34 8-46 1   HEMOGLOBIN 12 1 g/dL  11 5-15 4   MCHC 31 8 g/dL  31 4-37 4   MCH 30 4 pg  26 8-34 3   MCV 95 5 fL  82 0-98 0   PLATELET COUNT 759 Thousands/uL  149-390   RBC COUNT 3 98 Million/uL  3 81-5 12   RDW 11 9 %  11 6-15 1   WBC COUNT 8 14 Thousand/uL  4 31-10 16   MPV 11 0 fL  8 9-12 7     PHQ-2 Adult Depression Screening 11Jan2016 10:55AM Jimenez Hall     Test Name Result Flag Reference   PHQ-2 Adult Depression Score 0     Q1: 0, Q2: 0   PHQ-2 Adult Depression Screening Negative       Future Appointments    Date/Time Provider Specialty Site   01/30/2017 03:30 PM Jimenez Hall DO Internal Medicine [facility] INTERNAL MED     Signatures   Electronically signed by : Zunilda Banegas DO; Jan 25 2016  2:58PM EST                       (Author)

## 2018-01-11 ENCOUNTER — GENERIC CONVERSION - ENCOUNTER (OUTPATIENT)
Dept: INTERNAL MEDICINE CLINIC | Facility: CLINIC | Age: 33
End: 2018-01-11

## 2018-01-11 NOTE — PROGRESS NOTES
History of Present Illness  Bariatric MNT [facility] Screening Preop [facility]:     She was on time  Her surgeon is Dr Kimber Badillo  The patient was present at the session  The diagnosis/reason for the appointment is: She has a BMI of 71 and will need to lose 10% of her ABW  Exercise Frequency:  She does not exercise  Relationship to food: She is an emotional eater, is a stress eater and eats large portions  She does not know the difference between being comfortably full and uncomfortably full and does not know the difference between being stuffed and comfortably full  She felt/thought they felt her best at 200 lbs, size L-XL pounds she last weighed this unknown    She completed a food journal (works night shift 4 nights a week  drinks 16oz juice daily, 20oz soda 3-4 times per week) 24-Hour Food Recall Breakfast 3 eggs, 4-5 slices boswell, 2 slices toast with butter  Lunch: [employer] burger, french fries, apple pies, large sweet tea  Dinner: rice and beans from home (3-4 cups) or orders outs 4 slices of pizza  She drinks 1 galloon daily cups of water daily Her motivators are:does not want to loose her independence  Her obesity/being overweight is related to excessive energy intakes, sedentary lifestyle and is evidenced by: BMI 71  Knowledge Deficit Prior to Education  She previously completed pre-op program twice  Barriers to education:  She has no barriers to education  Medical Nutrition Therapy Intervention: Individualized Meal Plan, Daily Food /Exercise Diary, Lifestyle /Behavior Modification Techniques, Basic Pathophysiology of Obesity, Label Reading, Checklist of the Overview of Lesson Plans, Surgical changes to Stomach/GI and Food/Medication Interactions  Area's Reviewed: Post - surgery goal weight, Web forum, Lifestyle Thor K[employer] Modification Techniques, [employer] in [employer] ( hand out for [employer]), Pre- surgery goals /rules and Appetite Awareness     Brief Review of Vitamins, diet progression for post-op and Pathophysiology of Obesity  Her comprehension of the presented material was very good  Her receptivity to the presented material was very good  Her motivation was very good  Provided: Nutrition Guidelines for Gastric Surgery, Food Journaling Application handout, Bariatric Program Pre- Surgery Nutrition and Goals  Patient is a 28year old who is here for nutritional evaluation for weight loss surgery  I reviewed comorbidities and medications prior to session  She first recalls having problems with weight gain at the age of 3   Patient feels that her weight gain over the past 16 months is due to eating large portions and stress in her life  She has dieted in the past with variable success but would regain the weight back  (refer to diet history for details)  For her personal pre-operative goals she will: 1  measure and reduce portion sizes 2  eliminate sugary beverage intakes (ie sweet tea, soda, juice, etc) 3  return to food journaling at least 3 days per week She will complete all 6 lesson plans in her bariatric booklet  She was instructed on the importance of consistent vitamin and mineral intake after her surgery to prevent deficiencies  She currently does not take any vitamin or mineral supplements   Recommended that she take an adult multivitamin/mineral plus 2000 IU of vitamin D3  Reviewed importance of support after surgery and discussed the web forum, gordon, pep rally and support group  Patient states adequate knowledge of nutrition, exercise and behavior modification required for long term success  Pt  is recommended for surgery and is aware that she will be required to follow the 2 week pre operative liver shrinking diet prior to surgery  Pt also understands that she needs to implement lifestyle changes in preparation for surgery  Patient is aware that she has 6 months of weigh ins required by her insurance  Active Problems    1   Asthma (493 90) (J45 909)   2  Edema (782 3) (R60 9)   3  Headache (784 0) (R51)   4  Inflammatory disease of cervix, vagina, and vulva (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 9) (N73 9)   5  Left lumbar radiculopathy (724 4) (M54 16)   6  Major depressive disorder, recurrent episode, severe (296 33) (F33 2)   7  Morbid or severe obesity due to excess calories (278 01) (E66 01)   8  Palpitations (785 1) (R00 2)   9  Screening for cardiovascular condition (V81 2) (Z13 6)   10  Screening for depression (V79 0) (Z13 89)   11  Screening for diabetes mellitus (DM) (V77 1) (Z13 1)   12  Screening for thyroid disorder (V77 0) (Z13 29)   13  Shortness of breath (786 05) (R06 02)   14  Varicose Veins Of Lower Extremities (454 9)    Past Medical History    1  History of depression (V11 8) (Z86 59)    Surgical History    1  History of Incision And Drainage Of Skin Abscess    Family History  Father    1  Family history of cardiac disorder (V17 49) (Z82 49)   2  Family history of hypertension (V17 49) (Z82 49)  Maternal Grandmother    3  Family history of diabetes mellitus (V18 0) (Z83 3)    Social History    · Denied: History of Alcohol use   · Denied: History of Drug use   · Former smoker (V15 82) (C03 275)   · Occupation   · Single    Current Meds   1  Allegra Allergy 60 MG Oral Tablet; Therapy: 79BXU1452 to Recorded   2  FLUoxetine HCl - 10 MG Oral Capsule; TAKE 1 CAPSULE BY MOUTH EVERYDAY; Therapy: 24FQJ9437 to (Evaluate:76Vhj4217)  Requested for: 12Jun2017; Last   Rx:12Jun2017 Ordered   3  FLUoxetine HCl - 10 MG Oral Tablet; TAKE ONE (1) TABLET(S) DAILY; Therapy: 43FHA9782 to (Evaluate:07Jun2017)  Requested for: 53LIX6696; Last   CP:14NTR0246 Ordered   4  Nexplanon 68 MG Subcutaneous Implant; Therapy: (Recorded:84Zlw1091) to Recorded   5  Ventolin  (90 Base) MCG/ACT Inhalation Aerosol Solution; Therapy: (Recorded:11Jan2016) to Recorded    Allergies    1  No Known Drug Allergies    2  Dust   3  Grass   4  Pollen   5  Ragweed   6   Seasonal    Vitals  Signs Recorded: 90Tgf1277 09:17AM   Height: 5 ft 4 25 in  Weight: 416 lb 8 0 oz  BMI Calculated: 70 93  BSA Calculated: 2 68    Future Appointments    Date/Time Provider Specialty Site   06/27/2017 04:00 PM Nicola Cade, LCSW Psychiatry [facility]   06/13/2017 04:00 PM Kimmie Poole, DO Internal Medicine [facility]     Signatures   Electronically signed by : MAURO Saleh; Jun 13 2017  9:22AM EST                       (Author)    Electronically signed by :  GERBER Mo ; Jun 23 2017  1:01PM EST

## 2018-01-11 NOTE — PSYCH
Treatment Plan Tracking    #1 Treatment Plan not completed within required time limits due to: Client presented with emotional/behavioral issues that required clinical intervention            Signatures   Electronically signed by : Bassam Dewey LCSW; Sep  1 2017 10:08AM EST                       (Author)

## 2018-01-12 VITALS
SYSTOLIC BLOOD PRESSURE: 130 MMHG | HEIGHT: 65 IN | TEMPERATURE: 98.5 F | HEART RATE: 71 BPM | RESPIRATION RATE: 14 BRPM | BODY MASS INDEX: 48.82 KG/M2 | DIASTOLIC BLOOD PRESSURE: 72 MMHG | WEIGHT: 293 LBS

## 2018-01-12 VITALS — BODY MASS INDEX: 50.02 KG/M2 | WEIGHT: 293 LBS | HEIGHT: 64 IN

## 2018-01-12 NOTE — RESULT NOTES
Verified Results  (1) CBC/ PLT (NO DIFF) 99XWY6173 12:22PM Carmen Galvan Order Number: [id number]     Order Number: [id number]     Test Name Result Flag Reference   HEMATOCRIT 36 9 %  34 8-46 1   HEMOGLOBIN 11 7 g/dL  11 5-15 4   MCHC 31 7 g/dL  31 4-37 4   MCH 30 3 pg  26 8-34 3   MCV 96 fL  82-98   PLATELET COUNT 649 Thousands/uL  149-390   RBC COUNT 3 86 Million/uL  3 81-5 12   RDW 11 7 %  11 6-15 1   WBC COUNT 6 18 Thousand/uL  4 31-10 16   MPV 10 4 fL  8 9-12 7     (1) COMPREHENSIVE METABOLIC PANEL 12KUL5048 16:49HE Carmen Galvan Order Number: [id number]      National Kidney Disease Education Program recommendations are as follows:  GFR calculation is accurate only with a steady state creatinine  Chronic Kidney disease less than 60 ml/min/1 73 sq  meters  Kidney failure less than 15 ml/min/1 73 sq  meters  Test Name Result Flag Reference   GLUCOSE,RANDM 87 mg/dL     If the patient is fasting, the ADA then defines impaired fasting glucose as > 100 mg/dL and diabetes as > or equal to 123 mg/dL     SODIUM 138 mmol/L  136-145   POTASSIUM 3 9 mmol/L  3 5-5 3   CHLORIDE 107 mmol/L  100-108   CARBON DIOXIDE 26 mmol/L  21-32   ANION GAP (CALC) 5 mmol/L  4-13   BLOOD UREA NITROGEN 11 mg/dL  5-25   CREATININE 0 56 mg/dL L 0 60-1 30   Standardized to IDMS reference method   CALCIUM 7 7 mg/dL L 8 3-10 1   BILI, TOTAL 0 34 mg/dL  0 20-1 00   ALK PHOSPHATAS 67 U/L     ALT (SGPT) 39 U/L  12-78   AST(SGOT) 20 U/L  5-45   ALBUMIN 3 0 g/dL L 3 5-5 0   TOTAL PROTEIN 6 9 g/dL  6 4-8 2   eGFR Non-African American      >60 0 ml/min/1 73sq m     (1) VITAMIN B12 28Mar2016 12:22PM Carlyn Kumari    Order Number: [id number]     Test Name Result Flag Reference   VITAMIN B12 452 pg/mL  100-900     (1) FERRITIN 07DXW2942 12:22PM [address] Order Number: [id number]     Test Name Result Flag Reference   FERRITIN 85 ng/mL  8-388     (1) FOLATE 42YVQ3659 12:22PM [address] Order Number: [id number] Test Name Result Flag Reference   FOLATE 16 6 ng/mL  3 1-17 5     (1) IRON SATURATION %, TIBC 28Mar2016 12:22PM Lisbet Gardner Order Number: [id number]     Test Name Result Flag Reference   IRON SATURATION 39 %     TOTAL IRON BINDING CAPACITY 321 ug/dL  250-450   IRON 126 ug/dL       (1) VITAMIN B1, WHOLE BLOOD 40NBL4985 12:22PM Lisbet Kenrda Order Number: [id number]    Performed at:  [address]  [id number]  : Yolanda Blakely MD, Phone:  [phone]     Test Name Result Flag Reference   VITAMIN B1, WHOLE BLOOD 101 1 nmol/L  66 5 - 200 0       Discussion/Summary  Your results have some minor abnormalities, but nothing that is concerning  Your calcium level is slightly low however when you correct this for your albumen level the low value is actually within the normal range  Nothing to do for this  Your albumen level is low  His may indicate that you are not getting in enough protein in your diet  Please ensure that you're getting in at least 60-80 g of protein per day  Your other lab values are within an acceptable range  Please keep your regularly scheduled follow-up appointment  If you do not have a follow-up scheduled, please call the office to schedule a follow-up visit        Signatures   Electronically signed by : GERBER Reyes ; Apr 7 2016  3:17PM EST                       (Author)

## 2018-01-13 VITALS
HEIGHT: 64 IN | WEIGHT: 293 LBS | BODY MASS INDEX: 50.02 KG/M2 | OXYGEN SATURATION: 98 % | TEMPERATURE: 98.4 F | DIASTOLIC BLOOD PRESSURE: 80 MMHG | SYSTOLIC BLOOD PRESSURE: 118 MMHG | RESPIRATION RATE: 18 BRPM | HEART RATE: 69 BPM

## 2018-01-13 NOTE — PROGRESS NOTES
Message  Outreach phone call no response but left message for a return phone call  Is patient still interested in bariatric surgery  Please get back to us  If no return phone call patient will be halted  [place]    10/5/2017 10:21:44 AM - Haim Bustamante  Correction I will wait to see if patient keeps appointment with Dr Olesya Jimenez on 10/23/17  [place]      Active Problems    1  Asthma (493 90) (J45 909)   2  Binge-eating disorder, severe (783 6) (F50 81)   3  Depression (311) (F32 9)   4  Edema (782 3) (R60 9)   5  Headache (784 0) (R51)   6  Inflammatory disease of cervix, vagina, and vulva (616 9) (N73 9)   7  Insomnia (780 52) (G47 00)   8  Left lumbar radiculopathy (724 4) (M54 16)   9  Moderate episode of recurrent major depressive disorder (296 32) (F33 1)   10  Morbid or severe obesity due to excess calories (278 01) (E66 01)   11  Palpitations (785 1) (R00 2)   12  Screening for cardiovascular condition (V81 2) (Z13 6)   13  Screening for depression (V79 0) (Z13 89)   14  Screening for diabetes mellitus (DM) (V77 1) (Z13 1)   15  Screening for thyroid disorder (V77 0) (Z13 29)   16  Sebaceous cyst (706 2) (L72 3)   17  Shortness of breath (786 05) (R06 02)   18  Varicose Veins Of Lower Extremities (454 9)    Current Meds   1  Allegra Allergy 60 MG Oral Tablet (Fexofenadine HCl); Therapy: 14FYB0452 to Recorded   2  Belsomra 20 MG Oral Tablet; TAKE 1 TABLET AT  BEDTIME AS NEEDED FOR   INSOMNIA; Therapy: 94WLI4115 to (Evaluate:31Qlv1654); Last Rx:88Igb0492 Ordered   3  FLUoxetine HCl - 20 MG Oral Capsule; take 1 capsule by daily for two weeks then 2   capsules daily thereafter; Therapy: 97GBO6236 to (Evaluate:69Lsr0798)  Requested for: 28Wcs4800; Last   Rx:23Bju2290 Ordered   4  FLUoxetine HCl - 40 MG Oral Capsule; Take 1 capsule by mouth daily; Therapy: 62Kgz3376 to (Evaluate:46Iyx1229)  Requested for: 24Exf3408; Last   Rx:47Tch5026 Ordered   5  Nexplanon 68 MG Subcutaneous Implant;    Therapy: (Recorded:52Qfk7176) to Recorded   6  Ventolin  (90 Base) MCG/ACT Inhalation Aerosol Solution; Therapy: (Recorded:11Jan2016) to Recorded    Allergies    1  No Known Drug Allergies    2  Dust   3  Grass   4  Pollen   5  Ragweed   6   Seasonal    Signatures   Electronically signed by : WILLIAM Lambert; Oct  5 2017 11:22AM EST                       (Author)

## 2018-01-13 NOTE — PSYCH
Message   Recorded as Task   Date: 09/19/2017 10:18 AM, Created By: Cindy Ramesh   Task Name: Miscellaneous   Assigned To: Corinne Garvey   Regarding Patient: Ben Wang, Status: Active   CommentEather Skiff - 19 Sep 2017 10:18 AM     TASK CREATED  Caller: Self; Other; [phone] (Home); [phone] (Work)  Mark Nicole called and cancelled her 11am appt  for today  She called and left a message on the [employer]  machine at 8:40am   She is sick, for future appt  she needs to have Friday's only, since her work hrs  have changed  Active Problems    1  Asthma (493 90) (J45 909)   2  Binge-eating disorder, severe (783 6) (F50 81)   3  Depression (311) (F32 9)   4  Edema (782 3) (R60 9)   5  Headache (784 0) (R51)   6  Inflammatory disease of cervix, vagina, and vulva (616 9) (N73 9)   7  Insomnia (780 52) (G47 00)   8  Left lumbar radiculopathy (724 4) (M54 16)   9  Moderate episode of recurrent major depressive disorder (296 32) (F33 1)   10  Morbid or severe obesity due to excess calories (278 01) (E66 01)   11  Palpitations (785 1) (R00 2)   12  Screening for cardiovascular condition (V81 2) (Z13 6)   13  Screening for depression (V79 0) (Z13 89)   14  Screening for diabetes mellitus (DM) (V77 1) (Z13 1)   15  Screening for thyroid disorder (V77 0) (Z13 29)   16  Sebaceous cyst (706 2) (L72 3)   17  Shortness of breath (786 05) (R06 02)   18  Varicose Veins Of Lower Extremities (454 9)    Current Meds   1  Allegra Allergy 60 MG Oral Tablet (Fexofenadine HCl); Therapy: 09RBS0841 to Recorded   2  Belsomra 20 MG Oral Tablet; TAKE 1 TABLET AT  BEDTIME AS NEEDED FOR INSOMNIA; Therapy: 91TSW5497 to (Evaluate:68Oif6176); Last Rx:59Prv1988 Ordered   3  FLUoxetine HCl - 20 MG Oral Capsule; take 1 capsule by daily for two weeks then 2   capsules daily thereafter; Therapy: 66RGT4818 to (Evaluate:97Lvi6979)  Requested for: 05Umx2892; Last   Rx:28Edy2149 Ordered   4   FLUoxetine HCl - 40 MG Oral Capsule; Take 1 capsule by mouth daily; Therapy: 23Uzv6127 to (Evaluate:42Tbd2856)  Requested for: 67Cmv3518; Last   Rx:64Jjn7295 Ordered   5  Nexplanon 68 MG Subcutaneous Implant; Therapy: (Recorded:10Feb2016) to Recorded   6  Ventolin  (90 Base) MCG/ACT Inhalation Aerosol Solution; Therapy: (Recorded:11Jan2016) to Recorded    Allergies    1  No Known Drug Allergies    2  Dust   3  Grass   4  Pollen   5  Ragweed   6   Seasonal    Signatures   Electronically signed by : Corinna Arteaga LCSW; Sep 19 2017 10:54AM EST                       (Author)

## 2018-01-14 VITALS — WEIGHT: 293 LBS | BODY MASS INDEX: 50.02 KG/M2 | HEIGHT: 64 IN

## 2018-01-14 NOTE — PSYCH
Message   Recorded as Task   Date: 09/05/2017 10:30 AM, Created By: Kp Conway   Task Name: Miscellaneous   Assigned To: Elijah Dakin   Regarding Patient: Yosi Benedict, Status: Active   Kiran Begum - 05 Sep 2017 10:30 AM     TASK CREATED  Caller: Self; Other; [phone] (Home); [phone] (Work)  Patient called and cancelled her 11:00 appt  today at 10:30  She stated she is put on night shift and tried to take a nap and thought she would be ok to come in, but she is too tired  very sorry  Active Problems    1  Asthma (493 90) (J45 909)   2  Binge-eating disorder, severe (783 6) (F50 81)   3  Depression (311) (F32 9)   4  Edema (782 3) (R60 9)   5  Headache (784 0) (R51)   6  Inflammatory disease of cervix, vagina, and vulva (616 9) (N73 9)   7  Insomnia (780 52) (G47 00)   8  Left lumbar radiculopathy (724 4) (M54 16)   9  Moderate episode of recurrent major depressive disorder (296 32) (F33 1)   10  Morbid or severe obesity due to excess calories (278 01) (E66 01)   11  Palpitations (785 1) (R00 2)   12  Screening for cardiovascular condition (V81 2) (Z13 6)   13  Screening for depression (V79 0) (Z13 89)   14  Screening for diabetes mellitus (DM) (V77 1) (Z13 1)   15  Screening for thyroid disorder (V77 0) (Z13 29)   16  Sebaceous cyst (706 2) (L72 3)   17  Shortness of breath (786 05) (R06 02)   18  Varicose Veins Of Lower Extremities (454 9)    Current Meds   1  Allegra Allergy 60 MG Oral Tablet (Fexofenadine HCl); Therapy: 68RET2452 to Recorded   2  Belsomra 20 MG Oral Tablet; TAKE 1 TABLET AT  BEDTIME AS NEEDED FOR INSOMNIA; Therapy: 37RQX7639 to (Evaluate:16Oct2017); Last Rx:86Ika6964 Ordered   3  FLUoxetine HCl - 20 MG Oral Capsule; take 1 capsule by daily for two weeks then 2   capsules daily thereafter; Therapy: 64KAO0960 to (Evaluate:17Aug2017)  Requested for: 96Qtv0741; Last   Rx:62Dvj5752 Ordered   4  FLUoxetine HCl - 40 MG Oral Capsule;  Take 1 capsule by mouth daily; Therapy: 17Acq1841 to (Evaluate:17Qdl2652)  Requested for: 40Sjv1626; Last   Rx:77Wzg2972 Ordered   5  Nexplanon 68 MG Subcutaneous Implant; Therapy: (Recorded:10Feb2016) to Recorded   6  Ventolin  (90 Base) MCG/ACT Inhalation Aerosol Solution; Therapy: (Recorded:11Jan2016) to Recorded    Allergies    1  No Known Drug Allergies    2  Dust   3  Grass   4  Pollen   5  Ragweed   6   Seasonal    Signatures   Electronically signed by : Estrella Oconnor LCSW; Sep  5 2017 11:02AM EST                       (Author)

## 2018-01-14 NOTE — PSYCH
Message  Message Free Text Note Form: Received a call from Oralia Gaviria regarding [address]  bariatric program and awaiting clearance  Active Problems    1  Asthma (493 90) (J45 909)   2  Binge-eating disorder, severe (783 6) (F50 81)   3  Depression (311) (F32 9)   4  Edema (782 3) (R60 9)   5  Headache (784 0) (R51)   6  Inflammatory disease of cervix, vagina, and vulva (616 9) (N73 9)   7  Insomnia (780 52) (G47 00)   8  Left lumbar radiculopathy (724 4) (M54 16)   9  Moderate episode of recurrent major depressive disorder (296 32) (F33 1)   10  Morbid or severe obesity due to excess calories (278 01) (E66 01)   11  Palpitations (785 1) (R00 2)   12  Screening for cardiovascular condition (V81 2) (Z13 6)   13  Screening for depression (V79 0) (Z13 89)   14  Screening for diabetes mellitus (DM) (V77 1) (Z13 1)   15  Screening for thyroid disorder (V77 0) (Z13 29)   16  Sebaceous cyst (706 2) (L72 3)   17  Shortness of breath (786 05) (R06 02)   18  Varicose Veins Of Lower Extremities (454 9)    Current Meds   1  Allegra Allergy 60 MG Oral Tablet (Fexofenadine HCl); Therapy: 92XAE4122 to Recorded   2  Belsomra 20 MG Oral Tablet; TAKE 1 TABLET AT  BEDTIME AS NEEDED FOR INSOMNIA; Therapy: 08UGR4825 to (Evaluate:16Oct2017); Last Rx:41Dxf3069 Ordered   3  FLUoxetine HCl - 20 MG Oral Capsule; take 1 capsule by daily for two weeks then 2   capsules daily thereafter; Therapy: 92BMS1493 to (Evaluate:03Wet0267)  Requested for: 12AYW7727; Last   Rx:46Syn3840 Ordered   4  Nexplanon 68 MG Subcutaneous Implant; Therapy: (Recorded:74Ghs4856) to Recorded   5  Ventolin  (90 Base) MCG/ACT Inhalation Aerosol Solution; Therapy: (Recorded:11Jan2016) to Recorded    Allergies    1  No Known Drug Allergies    2  Dust   3  Grass   4  Pollen   5  Ragweed   6   Seasonal    Signatures   Electronically signed by : Christian Nguyen LCSW; Aug  4 2017 10:56AM EST                       (Author)

## 2018-01-14 NOTE — PROGRESS NOTES
History of Present Illness  Bariatric MNT [facility] Surgery Screening Preop [facility]:     She was on time  the appointment lasted: 60 minutes  Her surgeon is Dr Jeremiah Dyson  The patient was present at the session  The diagnosis/reason for the appointment is: She has a BMI of 63 4 and will need to lose 10% of her ABW  Diet Order: Nutritional Assesment for Bariatric Surgery  Her goal weight is N/A  She has the following comorbidities: asthma  Labs: Emely Sole She was reminded to have her labs drawn   She does not need to monitor her glucose  Exercise Frequency:  She exercises gym with her mother and gabriel  Relationship to food: She is an emotional eater, is a stress eater, eats when she is bored and eats large portions  She knows the difference between being comfortably full and uncomfortably full and knows the difference between being stuffed and comfortably full  She felt/thought they felt her best at has always been overweight pounds She completed a food journal She drinks 10-12 cups of water daily She drinks 0 caffienated beverages daily Her motivators are:wants to be healthy for her daughter  Her obesity/being overweight is related to excess energy intake and sedentary lifestyle  and is evidenced by: BMI 63  4  Knowledge Deficit Prior to Education  She is new to the diet  Barriers to education:  She has no barriers to education  Medical Nutrition Therapy Intervention: Daily Food /Exercise Diary, Lifestyle /Behavior Modification Techniques, Basic Pathophysiology of Obesity, Checklist of the Overview of Lesson Plans and Surgical changes to Stomach/GI  Area's Reviewed: Web forum, Lifestyle /Behavior Modification Techniques, [employer] in [place], Food journaling ( hand out for [facility]) and Pre- surgery goals Ximena Garcia  Brief Review of diet progression for post-op and Pathophysiology of Obesity  Her comprehension of the presented material was good  Her receptivity to the presented material was good  Her motivation was good  Provided: Nutrition Guidelines for Gastric Surgery, Bariatric Program Pre- Surgery Nutrition and Goals  Goals: Her set goal for physical activity is gym and gabriel , for 60 minutes, 4 days a week  Review [employer] in [place]   Post Surgery: She will adhere to the diet progression: remain hydrated, consume adequate protein; and take vitamins as outlined in guidelines  Patient is a 27year old who is here for nutritional evaluation for weight loss surgery  I reviewed comorbidities and medications prior to session  She went through the process 2 years ago but found out the day of surgery that she was pregnant  She has now returned to restart the process  She first recalls having problems with weight gain at the age of 1  She has dieted in the past with variable success but would regain the weight back  (refer to diet history for details)  For her personal pre-operative goals she will: food journal in [facility] [employer], continue to measure her portions and practice the 30/60 rule  She will complete all 6 lesson plans in her bariatric booklet  She was instructed on the importance of consistent vitamin and mineral intake after her surgery to prevent deficiencies  She currently takes a multivitamin and mineral plus a biotin   Reviewed importance of support after surgery and discussed the web forum, pep rally , [employer] and support group  Patient states adequate knowledge of nutrition, exercise and behavior modification required for long term success  Pt  is recommended for surgery and is aware to lose 38 pounds prior to surgery for a final goal weight of 343 pounds prior to surgery  Patient is aware that she has 6 months of weigh ins required by her insurance  Recommendations: She was provided contact information for any questions  ~He/She is aware she needs to lose 38 prior to surgery  She states adequate knowledge of nutrition, exercise, and behavior modification   She will attend a Team Meeting approximately 2-3 weeks prior to surgery  Active Problems    1  Asthma (493 90) (J45 909)   2  Edema (782 3) (R60 9)   3  Headache (784 0) (R51)   4  Inflammatory disease of cervix, vagina, and vulva (616 9) (N73 9)   5  Left lumbar radiculopathy (724 4) (M54 16)   6  Morbid or severe obesity due to excess calories (278 01) (E66 01)   7  Palpitations (785 1) (R00 2)   8  Screening for cardiovascular condition (V81 2) (Z13 6)   9  Screening for depression (V79 0) (Z13 89)   10  Screening for diabetes mellitus (DM) (V77 1) (Z13 1)   11  Screening for thyroid disorder (V77 0) (Z13 29)   12  Shortness of breath (786 05) (R06 02)   13  Varicose Veins Of Lower Extremities (454 9)    Past Medical History    1  History of Obesity surgery status (V45 86) (Z98 84)    Surgical History    1  History of Incision And Drainage Of Skin Abscess    Family History    1  Family history of cardiac disorder (V17 49) (Z82 49)   2  Family history of hypertension (V17 49) (Z82 49)    3  Family history of diabetes mellitus (V18 0) (Z83 3)    Social History    · Denied: History of Alcohol use   · Denied: History of Drug use   · Former smoker (V15 82) (K39 104)   · Occupation   · Single    Current Meds   1  Biotin TABS; Therapy: (Recorded:20Jcv2763) to Recorded   2  Multivitamin TABS; Therapy: (Recorded:62Pxv1444) to Recorded   3  Nexplanon 68 MG Subcutaneous Implant; Therapy: (Recorded:30Qcm3395) to Recorded   4  TraMADol HCl - 50 MG Oral Tablet; TAKE 1 TABLET QHS PRN; Therapy: 66HTT2249 to (Evaluate:58Ckl9848); Last Rx:25Jan2016 Ordered   5  Ventolin  (90 Base) MCG/ACT Inhalation Aerosol Solution; Therapy: (Recorded:11Jan2016) to Recorded    Allergies    1  No Known Drug Allergies    2  Dust   3  Grass   4  Pollen   5  Ragweed   6   Seasonal    Vitals  Signs [Data Includes: Current Encounter]   Recorded: 58AOX1826 09:11AM   Temperature: 98 2 F  Heart Rate: 72  Respiration: 20  Systolic: 968  Diastolic: 80  Height: 5 ft 5 in  Weight: 381 lb   BMI Calculated: 63 4  BSA Calculated: 2 6    Future Appointments    Date/Time Provider Specialty Site   03/14/2016 10:45 AM GERBER Gore  Bariatric Medicine [facility]   01/30/2017 03:30 PM Brittany Goodman DO Internal Medicine [facility] INTERNAL MED     Signatures   Electronically signed by : MAURO Church; Feb 10 2016 10:20AM EST                       (Author)    Electronically signed by :  GERBER Stanley ; Feb 12 2016  8:23AM EST

## 2018-01-15 VITALS
HEIGHT: 65 IN | DIASTOLIC BLOOD PRESSURE: 75 MMHG | OXYGEN SATURATION: 98 % | HEART RATE: 63 BPM | RESPIRATION RATE: 16 BRPM | BODY MASS INDEX: 48.82 KG/M2 | SYSTOLIC BLOOD PRESSURE: 118 MMHG | TEMPERATURE: 96.8 F | WEIGHT: 293 LBS

## 2018-01-15 VITALS
RESPIRATION RATE: 16 BRPM | DIASTOLIC BLOOD PRESSURE: 74 MMHG | BODY MASS INDEX: 48.82 KG/M2 | HEART RATE: 86 BPM | HEIGHT: 65 IN | WEIGHT: 293 LBS | OXYGEN SATURATION: 96 % | SYSTOLIC BLOOD PRESSURE: 136 MMHG | TEMPERATURE: 99.2 F

## 2018-01-15 VITALS — BODY MASS INDEX: 48.82 KG/M2 | WEIGHT: 293 LBS | HEIGHT: 65 IN

## 2018-01-15 NOTE — PSYCH
Message   Recorded as Task   Date: 08/04/2017 10:41 AM, Created By: Jose C Lane   Task Name: Care Coordination   Assigned To: Orion Barnett   Regarding Patient: Eliana Rosenthal, Status: Active   CommentOri Pérez - 04 Aug 2017 10:41 AM     TASK CREATED  Caller: Romero langford, Provider; Care Coordination; [phone]  She said their is ALDAIR, please call her about pt  Message Free Text Note Form: Left a voice mail acknowledging message , requested a return call if needed  Active Problems    1  Asthma (493 90) (J45 909)   2  Binge-eating disorder, severe (783 6) (F50 81)   3  Depression (311) (F32 9)   4  Edema (782 3) (R60 9)   5  Headache (784 0) (R51)   6  Inflammatory disease of cervix, vagina, and vulva (616 9) (N73 9)   7  Insomnia (780 52) (G47 00)   8  Left lumbar radiculopathy (724 4) (M54 16)   9  Moderate episode of recurrent major depressive disorder (296 32) (F33 1)   10  Morbid or severe obesity due to excess calories (278 01) (E66 01)   11  Palpitations (785 1) (R00 2)   12  Screening for cardiovascular condition (V81 2) (Z13 6)   13  Screening for depression (V79 0) (Z13 89)   14  Screening for diabetes mellitus (DM) (V77 1) (Z13 1)   15  Screening for thyroid disorder (V77 0) (Z13 29)   16  Sebaceous cyst (706 2) (L72 3)   17  Shortness of breath (786 05) (R06 02)   18  Varicose Veins Of Lower Extremities (454 9)    Current Meds   1  Allegra Allergy 60 MG Oral Tablet (Fexofenadine HCl); Therapy: 03QPU3843 to Recorded   2  Belsomra 20 MG Oral Tablet; TAKE 1 TABLET AT  BEDTIME AS NEEDED FOR INSOMNIA; Therapy: 06JKZ1643 to (Evaluate:16Oct2017); Last Rx:58Ghx0462 Ordered   3  FLUoxetine HCl - 20 MG Oral Capsule; take 1 capsule by daily for two weeks then 2   capsules daily thereafter; Therapy: 40OQF3202 to (Evaluate:17Aug2017)  Requested for: 22RCK4345; Last   Rx:67Syy8125 Ordered   4  Nexplanon 68 MG Subcutaneous Implant; Therapy: (Recorded:82Beo5529) to Recorded   5  Ventolin  (90 Base) MCG/ACT Inhalation Aerosol Solution; Therapy: (Recorded:11Jan2016) to Recorded    Allergies    1  No Known Drug Allergies    2  Dust   3  Grass   4  Pollen   5  Ragweed   6   Seasonal    Signatures   Electronically signed by : Debbie Lowery LCSW; Aug  4 2017 10:50AM EST                       (Author)

## 2018-01-15 NOTE — PROGRESS NOTES
History of Present Illness  Bariatric Behavioral Health Evaluation [facility]: She is seeking a Bariatric surgery evaluation  She researched this option for: Since 2012  She realizes the post-op requirements Yes, patient has researched procedure; patient has acquaintances with bariatric surgery   Her Psychiatric/Psychological diagnosis: She does not have an outpatient counselor  She does not have the counselor's number  She does not have a Psychiatrist  She does not have the Psychiatrist's number  She has not had Inpatient Treatment  (Patient diagnosed with depression by PCP; referred by PCP to meet with psychiatrist  Patient has appointment scheduled with psychiatrist for June 2017  [place] )  Family Constellation: Family Constellation: Mother: 47years old  Father: 46years old  Siblings: 1 brother and 3 sisters  Children: 1 child  Other: Fiance  She lives withher Fiance and daughter   Domestic Violence: has not happened  Abuse History: (None reported )   Physical/psychological assessment Appearance: appropriate   Sociability: average  Affect: appropriate  Mood: calm  Thought Process: coherent  Speech: normal  Content: no impairment  The patient was oriented to person, oriented to place, oriented to time and normal memory   normal attention span  no decreased concentration ability  normal judgment  Her emotional insight was: good  Her intellectual insight was: good  Risk assessment: Symptoms:  depressed mood, but no suicidal ideation, no suicide plan, no suicide attempt, no homicidal thoughts, no hopelessness, no helplessness, no feelings of despair, no anxiety, no loss of interests, no anhedonia and no sense of isolation  The patient is currently asymptomatic  Associated symptoms:  no aggressive behavior, no high risk behavior, no racing thoughts, no periods of excess energy, no periods of euphoria, no delusions, no command hallucinations, no auditory hallucinations and no visual hallucinations   No associated symptoms are reported  Current treatment includes mood stabilizers  By report, there is good compliance with treatment, good tolerance of treatment and good symptom control  Pertinent medical history:  depression, but no seasonal affective disorder, no premenstrual dysphoric disorder, no postpartum depression, no anxiety disorder, no bipolar disorder, no post traumatic stress disorder, no eating disorder, no personality disorder, no schizophrenia, no chronic pain, no chronic headaches, no dementia, no malignancy and no HIV infection  Risk factors:  no bereavement, no financial stress, no relationship problems, no job loss, no social isolation, no access to lethal means, no alcohol abuse, no substance abuse, no physical abuse, no sexual abuse, no emotional abuse, no bullying, no previous suicide attempt, no recent psychiatric hospitalization, no recent family suicide and no recent friend suicide  No risk factors have been identified  Family history:  substance abuse and materanl grandfather, but no suicide, no depression and no bipolar disorder  She was previously evaluated by me  Sexual history: She is not pregnant  She does not have a history of   She does not have a history of miscarriage  She does not have a history of hypersexuality  She does not have a history of STD's  Recommendations: She is not recommended for surger  She states adequate knowledge of nutrition, exercise, and behavior modification  (Recommendation deferred until psychiatric evaluation is received and reviewed  [place] )  The Following Ratings are based on my: Obsevation of this individual over the last  Risk of Harm to Self or Others: The following are demographic risk factors associated with harm to self: , [place], or   ( )  (None reported )  Recent Specific Risk Factors: The patient is currently asymptomatic  No associated symptoms are reported     Summary and Recommendations:   Low: No thoughts or occasional thoughts of suicide, but no intent or actions  Self inflicted scratches, abrasions, or other self- injurious of behavior where medical attention is typically not warranted  No elements of homicidality or occasional thoughts but no plan, intent, or actions  Active Problems    1  Asthma (493 90) (J45 909)   2  Edema (782 3) (R60 9)   3  Headache (784 0) (R51)   4  Inflammatory disease of cervix, vagina, and vulva (616 9) (N73 9)   5  Left lumbar radiculopathy (724 4) (M54 16)   6  Major depressive disorder, recurrent episode, severe (296 33) (F33 2)   7  Morbid or severe obesity due to excess calories (278 01) (E66 01)   8  Palpitations (785 1) (R00 2)   9  Screening for cardiovascular condition (V81 2) (Z13 6)   10  Screening for depression (V79 0) (Z13 89)   11  Screening for diabetes mellitus (DM) (V77 1) (Z13 1)   12  Screening for thyroid disorder (V77 0) (Z13 29)   13  Shortness of breath (786 05) (R06 02)   14  Varicose Veins Of Lower Extremities (454 9)    Past Medical History    1  History of depression (V11 8) (Z86 59)    Surgical History    1  History of Incision And Drainage Of Skin Abscess    Family History  Father    1  Family history of cardiac disorder (V17 49) (Z82 49)   2  Family history of hypertension (V17 49) (Z82 49)  Maternal Grandmother    3  Family history of diabetes mellitus (V18 0) (Z83 3)    Social History    · Denied: History of Alcohol use   · Denied: History of Drug use   · Former smoker (V15 82) (R30 538)   · Occupation   · Single    Current Meds   1  Allegra Allergy 60 MG Oral Tablet (Fexofenadine HCl); Therapy: 15OPT8473 to Recorded   2  FLUoxetine HCl - 10 MG Oral Capsule; TAKE 1 CAPSULE BY MOUTH EVERYDAY; Therapy: 41FBH0023 to (Evaluate:86Dkf7974)  Requested for: 12Jun2017; Last   Rx:12Jun2017 Ordered   3  FLUoxetine HCl - 10 MG Oral Tablet; TAKE ONE (1) TABLET(S) DAILY;    Therapy: 79VVY0375 to (Evaluate:07Jun2017)  Requested for: 26HFF7272; Last ZR:76MOK9832 Ordered   4  Nexplanon 68 MG Subcutaneous Implant; Therapy: (Recorded:85Ddd8106) to Recorded   5  Ventolin  (90 Base) MCG/ACT Inhalation Aerosol Solution; Therapy: (Recorded:11Jan2016) to Recorded    Allergies    1  No Known Drug Allergies    2  Dust   3  Grass   4  Pollen   5  Ragweed   6  Seasonal    Vitals  Signs   Recorded: 13Jun2017 09:39AM   Height: 5 ft 4 25 in  Weight: 416 lb   BMI Calculated: 70 85  BSA Calculated: 2 68     Note   Note:   Completed Behavioral Health Assessment  Provided patient, education as needed  Patient admits to Axis I diagnosis of depression and is currently taking medication prescribed by her PCP  Patient will work on the following goals; reduce portion control and switch to non caloric drinks  Recommendation is deferred till receipt and review of psychiatric clearance  Future Appointments    Date/Time Provider Specialty Site   06/27/2017 04:00 PM Vanna Kramer, [facility] Psychiatry [facility]   06/13/2017 04:00 PM Brandi Abbott, DO Internal Medicine [facility] INTERNAL MED     Signatures   Electronically signed by : WILLIAM Cornejo; Jun 13 2017 10:26AM EST                       (Author)    Electronically signed by :  GERBER Lovell ; Jun 23 2017  1:01PM EST

## 2018-01-15 NOTE — PSYCH
Message  Message Free Text Note Form: Left message to inquire about Gerhardt Provost well being and if she was interested in pursuing individual therapy further  Active Problems    1  Asthma (493 90) (J45 909)   2  Binge-eating disorder, severe (783 6) (F50 81)   3  Depression (311) (F32 9)   4  Edema (782 3) (R60 9)   5  Headache (784 0) (R51)   6  Inflammatory disease of cervix, vagina, and vulva (616 9) (N73 9)   7  Insomnia (780 52) (G47 00)   8  Left lumbar radiculopathy (724 4) (M54 16)   9  Moderate episode of recurrent major depressive disorder (296 32) (F33 1)   10  Morbid or severe obesity due to excess calories (278 01) (E66 01)   11  Palpitations (785 1) (R00 2)   12  Screening for cardiovascular condition (V81 2) (Z13 6)   13  Screening for depression (V79 0) (Z13 89)   14  Screening for diabetes mellitus (DM) (V77 1) (Z13 1)   15  Screening for thyroid disorder (V77 0) (Z13 29)   16  Sebaceous cyst (706 2) (L72 3)   17  Shortness of breath (786 05) (R06 02)   18  Varicose Veins Of Lower Extremities (454 9)    Current Meds   1  Allegra Allergy 60 MG Oral Tablet (Fexofenadine HCl); Therapy: 77DLG9540 to Recorded   2  Belsomra 20 MG Oral Tablet; TAKE 1 TABLET AT  BEDTIME AS NEEDED FOR INSOMNIA; Therapy: 43KUI6066 to (Evaluate:49Jsr3451); Last Rx:60Clz4086 Ordered   3  FLUoxetine HCl - 20 MG Oral Capsule; take 1 capsule by daily for two weeks then 2   capsules daily thereafter; Therapy: 76BIO0153 to (Evaluate:83Jai1769)  Requested for: 03Nkp0409; Last   Rx:92Lyn5331 Ordered   4  FLUoxetine HCl - 40 MG Oral Capsule; Take 1 capsule by mouth daily; Therapy: 89Gjy4533 to (Evaluate:18Xtz8172)  Requested for: 94ICP0888; Last   Rx:34Rca7274; Status: ACTIVE - Renewal Denied Ordered   5  Nexplanon 68 MG Subcutaneous Implant; Therapy: (Recorded:84Qub4863) to Recorded   6  Ventolin  (90 Base) MCG/ACT Inhalation Aerosol Solution; Therapy: (Recorded:11Jan2016) to Recorded    Allergies    1   No Known Drug Allergies    2  Dust   3  Grass   4  Pollen   5  Ragweed   6   Seasonal    Signatures   Electronically signed by : Brown Calderón LCSW; Oct 17 2017 11:16AM EST                       (Author)

## 2018-01-15 NOTE — PSYCH
Message  Message Free Text Note Form: Faxed paperwork requested to [facility]  Migdalia Avilaelizabet  Active Problems    1  Asthma (493 90) (J45 909)   2  Binge-eating disorder, severe (783 6) (F50 81)   3  Depression (311) (F32 9)   4  Edema (782 3) (R60 9)   5  Headache (784 0) (R51)   6  Inflammatory disease of cervix, vagina, and vulva (616 9) (N73 9)   7  Insomnia (780 52) (G47 00)   8  Left lumbar radiculopathy (724 4) (M54 16)   9  Moderate episode of recurrent major depressive disorder (296 32) (F33 1)   10  Morbid or severe obesity due to excess calories (278 01) (E66 01)   11  Palpitations (785 1) (R00 2)   12  Screening for cardiovascular condition (V81 2) (Z13 6)   13  Screening for depression (V79 0) (Z13 89)   14  Screening for diabetes mellitus (DM) (V77 1) (Z13 1)   15  Screening for thyroid disorder (V77 0) (Z13 29)   16  Sebaceous cyst (706 2) (L72 3)   17  Shortness of breath (786 05) (R06 02)   18  Varicose Veins Of Lower Extremities (454 9)    Current Meds   1  Allegra Allergy 60 MG Oral Tablet (Fexofenadine HCl); Therapy: 19TMY0866 to Recorded   2  Belsomra 20 MG Oral Tablet; TAKE 1 TABLET AT  BEDTIME AS NEEDED FOR INSOMNIA; Therapy: 63XEI5638 to (Evaluate:16Oct2017); Last Rx:97Bel1890 Ordered   3  FLUoxetine HCl - 20 MG Oral Capsule; take 1 capsule by daily for two weeks then 2   capsules daily thereafter; Therapy: 55BZV5497 to (Evaluate:17Aug2017)  Requested for: 09CGT0220; Last   Rx:70Mik2354 Ordered   4  Nexplanon 68 MG Subcutaneous Implant; Therapy: (Recorded:12Njz7270) to Recorded   5  Ventolin  (90 Base) MCG/ACT Inhalation Aerosol Solution; Therapy: (Recorded:11Jan2016) to Recorded    Allergies    1  No Known Drug Allergies    2  Dust   3  Grass   4  Pollen   5  Ragweed   6   Seasonal    Signatures   Electronically signed by : Juancarlos Martinez LCSW; Aug  8 2017  3:02PM EST                       (Author)
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
## 2018-01-16 NOTE — PROGRESS NOTES
Message  Outreach phone call unable to leave message  I will try again and if unable to contact patient then she will be halted  [place]      Active Problems    1  Asthma (493 90) (J45 909)   2  Edema (782 3) (R60 9)   3  Gastroenteritis (558 9) (K52 9)   4  Headache (784 0) (R51)   5  Inflammatory disease of cervix, vagina, and vulva (616 9) (N73 9)   6  Intertrigo (695 89) (L30 4)   7  Left lumbar radiculopathy (724 4) (M54 16)   8  Morbid or severe obesity due to excess calories (278 01) (E66 01)   9  Palpitations (785 1) (R00 2)   10  Screening for cardiovascular condition (V81 2) (Z13 6)   11  Screening for depression (V79 0) (Z13 89)   12  Screening for diabetes mellitus (DM) (V77 1) (Z13 1)   13  Screening for thyroid disorder (V77 0) (Z13 29)   14  Shortness of breath (786 05) (R06 02)   15  Varicose Veins Of Lower Extremities (454 9)    Current Meds   1  Biotin TABS; Therapy: (Recorded:62Eyi5353) to Recorded   2  Multivitamin TABS; Therapy: (Recorded:68Cuy9428) to Recorded   3  Nexplanon 68 MG Subcutaneous Implant; Therapy: (Recorded:92Apz0225) to Recorded   4  Nystatin 022827 UNIT/GM External Powder; APPLY SPARINGLY TO AFFECTED   AREA(S) TWICE DAILY; Therapy: 67MWS7716 to (Last Rx:15Mar2016)  Requested for: 10FPX8304 Ordered   5  TraMADol HCl - 50 MG Oral Tablet; TAKE 1 TABLET QHS PRN; Therapy: 62ASA8017 to (Evaluate:62Vmk8564); Last Rx:25Jan2016 Ordered   6  Ventolin  (90 Base) MCG/ACT Inhalation Aerosol Solution; Therapy: (Recorded:11Jan2016) to Recorded    Allergies    1  No Known Drug Allergies    2  Dust   3  Grass   4  Pollen   5  Ragweed   6   Seasonal    Signatures   Electronically signed by : WILLIAM Lambert; Jun 17 2016  2:50PM EST                       (Author)

## 2018-01-17 NOTE — MISCELLANEOUS
Message  Pt did not show for her 3/6 wt check consult with Dr Becca Cyr  Per the most recent note in exemplo- pt will possibly be halted d/t lack of f/u with office  Email sent to Anna Del Rosario for follow up  Active Problems    1  Asthma (493 90) (J45 909)   2  Binge-eating disorder, severe (783 6) (F50 81)   3  Depression (311) (F32 9)   4  Edema (782 3) (R60 9)   5  Headache (784 0) (R51)   6  Inflammatory disease of cervix, vagina, and vulva (616 9) (N73 9)   7  Insomnia (780 52) (G47 00)   8  Left lumbar radiculopathy (724 4) (M54 16)   9  Moderate episode of recurrent major depressive disorder (296 32) (F33 1)   10  Morbid or severe obesity due to excess calories (278 01) (E66 01)   11  Palpitations (785 1) (R00 2)   12  Screening for cardiovascular condition (V81 2) (Z13 6)   13  Screening for depression (V79 0) (Z13 89)   14  Screening for diabetes mellitus (DM) (V77 1) (Z13 1)   15  Screening for thyroid disorder (V77 0) (Z13 29)   16  Sebaceous cyst (706 2) (L72 3)   17  Shortness of breath (786 05) (R06 02)   18  Varicose Veins Of Lower Extremities (454 9)    Current Meds   1  Allegra Allergy 60 MG Oral Tablet (Fexofenadine HCl); Therapy: 86RTM4340 to Recorded   2  Belsomra 20 MG Oral Tablet; TAKE 1 TABLET AT  BEDTIME AS NEEDED FOR   INSOMNIA; Therapy: 30UFK0473 to (Evaluate:79Lxu3183); Last Rx:31Czq0239 Ordered   3  FLUoxetine HCl - 20 MG Oral Capsule; take 1 capsule by daily for two weeks then 2   capsules daily thereafter; Therapy: 19POS6150 to (Evaluate:52Dha2891)  Requested for: 95Loe6249; Last   Rx:76Lit2066 Ordered   4  FLUoxetine HCl - 40 MG Oral Capsule; Take 1 capsule by mouth daily; Therapy: 66Jkd0506 to (Evaluate:17Hln4009)  Requested for: 90XBM9489; Last   Rx:89Kyt3370; Status: ACTIVE - Renewal Denied Ordered   5  Nexplanon 68 MG Subcutaneous Implant; Therapy: (Recorded:80Tvy8771) to Recorded   6  Ventolin  (90 Base) MCG/ACT Inhalation Aerosol Solution;    Therapy: (Recorded:11Jan2016) to Recorded    Allergies    1  No Known Drug Allergies    2  Dust   3  Grass   4  Pollen   5  Ragweed   6   Seasonal    Signatures   Electronically signed by : Slade Mei, ; Oct 23 2017  3:44PM EST                       (Author)

## 2018-01-18 NOTE — PROGRESS NOTES
Active Problems    1  Asthma (493 90) (J45 909)   2  Binge-eating disorder, severe (783 6) (F50 81)   3  Depression (311) (F32 9)   4  Edema (782 3) (R60 9)   5  Headache (784 0) (R51)   6  Inflammatory disease of cervix, vagina, and vulva (616 9) (N73 9)   7  Insomnia (780 52) (G47 00)   8  Left lumbar radiculopathy (724 4) (M54 16)   9  Moderate episode of recurrent major depressive disorder (296 32) (F33 1)   10  Morbid or severe obesity due to excess calories (278 01) (E66 01)   11  Palpitations (785 1) (R00 2)   12  Screening for cardiovascular condition (V81 2) (Z13 6)   13  Screening for depression (V79 0) (Z13 89)   14  Screening for diabetes mellitus (DM) (V77 1) (Z13 1)   15  Screening for thyroid disorder (V77 0) (Z13 29)   16  Sebaceous cyst (706 2) (L72 3)   17  Shortness of breath (786 05) (R06 02)   18  Varicose Veins Of Lower Extremities (454 9)    Past Medical History    1  History of Major depressive disorder, recurrent episode, severe (296 33) (F33 2)    Surgical History    1  History of Incision And Drainage Of Skin Abscess    Family History  Father    1  Family history of cardiac disorder (V17 49) (Z82 49)   2  Family history of hypertension (V17 49) (Z82 49)  Maternal Grandmother    3  Family history of diabetes mellitus (V18 0) (Z83 3)    Social History    · Denied: History of Alcohol use   · Denied: History of Drug use   · Former smoker (V15 82) (K71 241)   · Occupation   · Single    Current Meds   1  Allegra Allergy 60 MG Oral Tablet (Fexofenadine HCl); Therapy: 51HXM8327 to Recorded   2  Belsomra 20 MG Oral Tablet; TAKE 1 TABLET AT  BEDTIME AS NEEDED FOR INSOMNIA; Therapy: 92JHY9360 to (Evaluate:16Oct2017); Last Rx:86Vaq9186 Ordered   3  FLUoxetine HCl - 20 MG Oral Capsule; take 1 capsule by daily for two weeks then 2   capsules daily thereafter; Therapy: 10KJK9945 to (Evaluate:17Aug2017)  Requested for: 84FUG2031; Last   Rx:11Yoy6251 Ordered   4   Nexplanon 68 MG Subcutaneous Implant; Therapy: (Recorded:32Zxi8097) to Recorded   5  Ventolin  (90 Base) MCG/ACT Inhalation Aerosol Solution; Therapy: (Recorded:02Lxb3481) to Recorded    Allergies    1  No Known Drug Allergies    2  Dust   3  Grass   4  Pollen   5  Ragweed   6  Seasonal     Note   Note:   Met with patient and reviewed the following; weight loss, workflow, physical activity and psychiatric clearance  Patient losing weight and reads manual  She plans to attend August support group  Patient using fit bit to track steps( averages about 7000 steps daily)  I told patient I spoke with therapist therefore I recommend she speak with PCP about psychiatric clearance   If PCP does not feel comfortable completing form then a letter stating patient is monitored and will continue to be monitored post surgery is acceptable  Next appointment with CARLOS and ANTONIO on 9/18/17@ 8:00am  [place]      Future Appointments    Date/Time Provider Specialty Site   08/07/2017 05:00 PM Alba Gonsales, MARYW Psychiatry [facility]   09/05/2017 11:00 AM Alba Gonsales [facility] Psychiatry [facility]   09/19/2017 11:00 [facility]   10/03/2017 11:00 AM Alba Gonsales, [facility] Psychiatry [facility]   10/17/2017 11:00 AM Alba Gonsales, [facility] Psychiatry [facility]   12/18/2017 10:00 AM Lorin Christianson DO Psychiatry [facility]   10/23/2017 02:30 PM GERBER Barnhart  Bariatric Medicine [place] [address] [facility]ENTER   08/15/2017 11:00 AM Franck Webster DO Internal Medicine [facility] INTERNAL MED     Signatures   Electronically signed by : WILLIAM Aldana; Aug  7 2017 10:17AM EST                       (Author)    Electronically signed by :  GERBER Larkin ; Aug  8 2017  9:17AM EST

## 2018-01-18 NOTE — PROGRESS NOTES
Active Problems    1  Asthma (493 90) (J45 909)   2  Binge-eating disorder, severe (783 6) (F50 81)   3  Depression (311) (F32 9)   4  Edema (782 3) (R60 9)   5  Headache (784 0) (R51)   6  Inflammatory disease of cervix, vagina, and vulva (616 9) (N73 9)   7  Left lumbar radiculopathy (724 4) (M54 16)   8  Moderate episode of recurrent major depressive disorder (296 32) (F33 1)   9  Morbid or severe obesity due to excess calories (278 01) (E66 01)   10  Palpitations (785 1) (R00 2)   11  Screening for cardiovascular condition (V81 2) (Z13 6)   12  Screening for depression (V79 0) (Z13 89)   13  Screening for diabetes mellitus (DM) (V77 1) (Z13 1)   14  Screening for thyroid disorder (V77 0) (Z13 29)   15  Sebaceous cyst (706 2) (L72 3)   16  Shortness of breath (786 05) (R06 02)   17  Varicose Veins Of Lower Extremities (454 9)    Past Medical History    1  History of Major depressive disorder, recurrent episode, severe (296 33) (F33 2)    Surgical History    1  History of Incision And Drainage Of Skin Abscess    Family History  Father    1  Family history of cardiac disorder (V17 49) (Z82 49)   2  Family history of hypertension (V17 49) (Z82 49)  Maternal Grandmother    3  Family history of diabetes mellitus (V18 0) (Z83 3)    Social History    · Denied: History of Alcohol use   · Denied: History of Drug use   · Former smoker (V15 82) (T81 051)   · Occupation   · Single    Current Meds   1  Allegra Allergy 60 MG Oral Tablet (Fexofenadine HCl); Therapy: 58IXK8893 to Recorded   2  FLUoxetine HCl - 10 MG Oral Capsule; TAKE 1 CAPSULE BY MOUTH EVERYDAY; Therapy: 29NJR2395 to (Evaluate:98Egd9765)  Requested for: 12Jun2017; Last   Rx:12Jun2017 Ordered   3  FLUoxetine HCl - 10 MG Oral Tablet; TAKE ONE (1) TABLET(S) DAILY; Therapy: 24WDB2349 to (Evaluate:07Jun2017)  Requested for: 47YHG2814; Last   EA:39ZBD1971 Ordered   4  Nexplanon 68 MG Subcutaneous Implant; Therapy: (Recorded:47Shz2363) to Recorded   5  PROzac 10 MG Oral Capsule (FLUoxetine HCl); Therapy: (Recorded:13Jun2017) to Recorded   6  Ventolin  (90 Base) MCG/ACT Inhalation Aerosol Solution; Therapy: (Recorded:11Jan2016) to Recorded    Allergies    1  No Known Drug Allergies    2  Dust   3  Grass   4  Pollen   5  Ragweed   6  Seasonal     Note   Note:   Met with patient and discussed the following; weight loss and workflow  Patient shared she was told psychiatrist will not complete psychiatric clearance until December  Patient signed Release of Health Information and I will call psychologist and make them aware patient in under the care of bariatric medicine  Next appointments will be with SW and RD due to 6 month requirement ; [email or web address] & 9/18/17@ 8:00am  Patient is scheduled with Dr Sheryle Lily in Oct  [place]      Future Appointments    Date/Time Provider Specialty Site   09/05/2017 11:00 AM Justo Wong, YASMEEN Psychiatry [facility]   09/19/2017 11:00 AM Justo Wong [facility] Psychiatry [facility]   10/03/2017 11:00 AM Justo Wong, [facility] Psychiatry [facility]   10/17/2017 11:00 AM Justo Wong, [facility] Psychiatry [facility]   12/18/2017 10:00 AM Eileen Carrel, DO Psychiatry [facility]   10/23/2017 02:30 PM GERBER San  Bariatric Medicine [facility]NA[facility] C[facility]   07/18/2017 11:30 AM Leelee Esparza DO Internal Medicine [facility] INTERNAL MED     Signatures   Electronically signed by : WILLIAM Mccauley; Jul 17 2017 11:37AM EST                       (Author)    Electronically signed by :  GERBER Kevin ; Jul 24 2017 12:14PM EST

## 2018-01-18 NOTE — PSYCH
Treatment Plan Tracking    #1 Treatment Plan not completed within required time limits due to: Client did not schedule an appointment within 15 days of initial assessment            Signatures   Electronically signed by : Sarita Taylor LCSW; Jun 28 2017  8:03AM EST                       (Author)
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
## 2018-01-18 NOTE — PSYCH
Progress Note  Psychotherapy Provided [facility]: Individual Psychotherapy 50 minutes provided today  Goals addressed in session:   Addressed goals 1-3 of initial plan    D: Met with Renee individually  ROS; feeling confident that she is on the right path to her weight loss surgery  Discussed recent appointment with CARLOS, reviewed paperwork on psychotherapy appointments and PCP will sign off on medications as they are handling until Psychiatrist intake appointment on 10/18/17  Further discussion regarding sense of self, habitual eating, mindful eating and emotional eating  Clinician discussed eating disorder group that meets here every other week  Renee stated she will consider coming to group monthly as her co-pay is very high  A: Brittney's eating routine stems from historic 'tapes' stated by mom and carry over to her current family  Periods of mindless eating may also contribute towards [place] being comfortable in her skin as she identifies/assessed as having high self esteem  Historic issues with father are also a contributing factor  P: Continue individual therapy  Begin to focus upon historic circumstances involving both parents  Pain Scale and Suicide Risk [facility]: Current Pain Assessment: no pain   Current suicide risk is low   Behavioral Health Treatment Plan [address] [place] [address] [address]: Diagnosis and Treatment Plan explained to patient, patient relates understanding diagnosis and is agreeable to Treatment Plan  Results/Data  PHQ-9 Adult Depression Screening [id number] 06:06PM Jo Ann Sherman     Test Name Result Flag Reference   PHQ-9 Adult Depression Score 2     Over the last two weeks, how often have you been bothered by any of the following problems?   Little interest or pleasure in doing things: Not at all - 0  Feeling down, depressed, or hopeless: Several days - 1  Trouble falling or staying asleep, or sleeping too much: Not at all - 0  Feeling tired or having little energy: Several days - 1  Poor appetite or over eating: Not at all - 0  Feeling bad about yourself - or that you are a failure or have let yourself or your family down: Not at all - 0  Trouble concentrating on things, such as reading the newspaper or watching television: Not at all - 0  Moving or speaking so slowly that other people could have noticed  Or the opposite -  being so fidgety or restless that you have been moving around a lot more than usual: Not at all - 0  Thoughts that you would be better off dead, or of hurting yourself in some way: Not at all - 0   PHQ-9 Adult Depression Screening Negative     PHQ-9 Difficulty Level Not difficult at all     PHQ-9 Severity Minimal Depression         Assessment    1  Moderate episode of recurrent major depressive disorder (296 32) (F33 1)   2   Binge-eating disorder, severe (783 6) (F51 81)    Signatures   Electronically signed by : Juancarlos Martinez LCSW; Aug  8 2017  3:08PM EST                       (Author)

## 2018-01-23 ENCOUNTER — HOSPITAL ENCOUNTER (EMERGENCY)
Facility: HOSPITAL | Age: 33
Discharge: HOME | End: 2018-01-23
Admitting: EMERGENCY MEDICINE
Payer: COMMERCIAL

## 2018-01-23 ENCOUNTER — APPOINTMENT (EMERGENCY)
Dept: CT IMAGING | Facility: HOSPITAL | Age: 33
End: 2018-01-23
Payer: COMMERCIAL

## 2018-01-23 VITALS
OXYGEN SATURATION: 99 % | BODY MASS INDEX: 72.61 KG/M2 | DIASTOLIC BLOOD PRESSURE: 74 MMHG | WEIGHT: 293 LBS | HEART RATE: 83 BPM | RESPIRATION RATE: 18 BRPM | SYSTOLIC BLOOD PRESSURE: 151 MMHG | TEMPERATURE: 98.7 F

## 2018-01-23 DIAGNOSIS — K52.9 GASTROENTERITIS: Primary | ICD-10-CM

## 2018-01-23 LAB
ALBUMIN SERPL BCP-MCNC: 2.9 G/DL (ref 3.5–5)
ALP SERPL-CCNC: 56 U/L (ref 46–116)
ALT SERPL W P-5'-P-CCNC: 23 U/L (ref 12–78)
ANION GAP SERPL CALCULATED.3IONS-SCNC: 8 MMOL/L (ref 4–13)
AST SERPL W P-5'-P-CCNC: 8 U/L (ref 5–45)
BASOPHILS # BLD AUTO: 0.01 THOUSANDS/ΜL (ref 0–0.1)
BASOPHILS NFR BLD AUTO: 0 % (ref 0–1)
BILIRUB SERPL-MCNC: 0.4 MG/DL (ref 0.2–1)
BILIRUB UR QL STRIP: NEGATIVE
BUN SERPL-MCNC: 8 MG/DL (ref 5–25)
CALCIUM SERPL-MCNC: 8.4 MG/DL (ref 8.3–10.1)
CHLORIDE SERPL-SCNC: 102 MMOL/L (ref 100–108)
CLARITY UR: NORMAL
CO2 SERPL-SCNC: 28 MMOL/L (ref 21–32)
COLOR UR: YELLOW
CREAT SERPL-MCNC: 0.72 MG/DL (ref 0.6–1.3)
EOSINOPHIL # BLD AUTO: 0.17 THOUSAND/ΜL (ref 0–0.61)
EOSINOPHIL NFR BLD AUTO: 3 % (ref 0–6)
ERYTHROCYTE [DISTWIDTH] IN BLOOD BY AUTOMATED COUNT: 12 % (ref 11.6–15.1)
EXT PREG TEST URINE: NEGATIVE
GFR SERPL CREATININE-BSD FRML MDRD: 111 ML/MIN/1.73SQ M
GLUCOSE SERPL-MCNC: 81 MG/DL (ref 65–140)
GLUCOSE UR STRIP-MCNC: NEGATIVE MG/DL
HCT VFR BLD AUTO: 36.7 % (ref 34.8–46.1)
HGB BLD-MCNC: 11.9 G/DL (ref 11.5–15.4)
HGB UR QL STRIP.AUTO: NEGATIVE
KETONES UR STRIP-MCNC: NEGATIVE MG/DL
LEUKOCYTE ESTERASE UR QL STRIP: NEGATIVE
LIPASE SERPL-CCNC: 73 U/L (ref 73–393)
LYMPHOCYTES # BLD AUTO: 1.71 THOUSANDS/ΜL (ref 0.6–4.47)
LYMPHOCYTES NFR BLD AUTO: 27 % (ref 14–44)
MCH RBC QN AUTO: 30.7 PG (ref 26.8–34.3)
MCHC RBC AUTO-ENTMCNC: 32.4 G/DL (ref 31.4–37.4)
MCV RBC AUTO: 95 FL (ref 82–98)
MONOCYTES # BLD AUTO: 0.34 THOUSAND/ΜL (ref 0.17–1.22)
MONOCYTES NFR BLD AUTO: 6 % (ref 4–12)
NEUTROPHILS # BLD AUTO: 4 THOUSANDS/ΜL (ref 1.85–7.62)
NEUTS SEG NFR BLD AUTO: 64 % (ref 43–75)
NITRITE UR QL STRIP: NEGATIVE
PH UR STRIP.AUTO: 5.5 [PH] (ref 4.5–8)
PLATELET # BLD AUTO: 236 THOUSANDS/UL (ref 149–390)
PMV BLD AUTO: 9.8 FL (ref 8.9–12.7)
POTASSIUM SERPL-SCNC: 3.6 MMOL/L (ref 3.5–5.3)
PROT SERPL-MCNC: 7.7 G/DL (ref 6.4–8.2)
PROT UR STRIP-MCNC: NEGATIVE MG/DL
RBC # BLD AUTO: 3.88 MILLION/UL (ref 3.81–5.12)
SODIUM SERPL-SCNC: 138 MMOL/L (ref 136–145)
SP GR UR STRIP.AUTO: 1.02 (ref 1–1.03)
UROBILINOGEN UR QL STRIP.AUTO: 0.2 E.U./DL
WBC # BLD AUTO: 6.23 THOUSAND/UL (ref 4.31–10.16)

## 2018-01-23 PROCEDURE — 81003 URINALYSIS AUTO W/O SCOPE: CPT | Performed by: PHYSICIAN ASSISTANT

## 2018-01-23 PROCEDURE — 83690 ASSAY OF LIPASE: CPT | Performed by: PHYSICIAN ASSISTANT

## 2018-01-23 PROCEDURE — 85025 COMPLETE CBC W/AUTO DIFF WBC: CPT | Performed by: PHYSICIAN ASSISTANT

## 2018-01-23 PROCEDURE — 96361 HYDRATE IV INFUSION ADD-ON: CPT

## 2018-01-23 PROCEDURE — 96374 THER/PROPH/DIAG INJ IV PUSH: CPT

## 2018-01-23 PROCEDURE — 81025 URINE PREGNANCY TEST: CPT | Performed by: PHYSICIAN ASSISTANT

## 2018-01-23 PROCEDURE — 74177 CT ABD & PELVIS W/CONTRAST: CPT

## 2018-01-23 PROCEDURE — 36415 COLL VENOUS BLD VENIPUNCTURE: CPT | Performed by: PHYSICIAN ASSISTANT

## 2018-01-23 PROCEDURE — 80053 COMPREHEN METABOLIC PANEL: CPT | Performed by: PHYSICIAN ASSISTANT

## 2018-01-23 PROCEDURE — 99284 EMERGENCY DEPT VISIT MOD MDM: CPT

## 2018-01-23 RX ORDER — ONDANSETRON 4 MG/1
4 TABLET, FILM COATED ORAL EVERY 8 HOURS PRN
Qty: 20 TABLET | Refills: 0 | Status: SHIPPED | OUTPATIENT
Start: 2018-01-23 | End: 2019-03-18

## 2018-01-23 RX ORDER — ONDANSETRON 2 MG/ML
4 INJECTION INTRAMUSCULAR; INTRAVENOUS ONCE
Status: COMPLETED | OUTPATIENT
Start: 2018-01-23 | End: 2018-01-23

## 2018-01-23 RX ORDER — DICYCLOMINE HCL 20 MG
20 TABLET ORAL ONCE
Status: COMPLETED | OUTPATIENT
Start: 2018-01-23 | End: 2018-01-23

## 2018-01-23 RX ORDER — DICYCLOMINE HYDROCHLORIDE 10 MG/1
10 CAPSULE ORAL
Qty: 9 CAPSULE | Refills: 0 | Status: SHIPPED | OUTPATIENT
Start: 2018-01-23 | End: 2018-11-17 | Stop reason: ALTCHOICE

## 2018-01-23 RX ADMIN — SODIUM CHLORIDE 1000 ML: 0.9 INJECTION, SOLUTION INTRAVENOUS at 20:32

## 2018-01-23 RX ADMIN — IOHEXOL 100 ML: 350 INJECTION, SOLUTION INTRAVENOUS at 21:24

## 2018-01-23 RX ADMIN — DICYCLOMINE HYDROCHLORIDE 20 MG: 20 TABLET ORAL at 20:35

## 2018-01-23 RX ADMIN — ONDANSETRON 4 MG: 2 INJECTION INTRAMUSCULAR; INTRAVENOUS at 20:33

## 2018-01-24 NOTE — DISCHARGE INSTRUCTIONS
The most important thing is free to maintain fluid hydration  Take Bentyl as needed for diarrhea control  Take Zofran every 8 hours as needed for nausea and vomiting control  Ibuprofen 600mg by mouth every 6 hours as needed for mild to moderate pain or fever  Acetaminophen 650mg by mouth every 8 hours as needed for mild to moderate pain or fever  You can take Ibuprofen and Acetaminophen together safely  Follow up with primary care doctor in 5-7 days for re-evaluation  Return to emergency department for fever, chills, chest pain, shortness of breath, worsening symptoms, abdominal pain, or any other concerns  Gastroenteritis   WHAT YOU NEED TO KNOW:   What is gastroenteritis? Gastroenteritis, or stomach flu, is an infection of the stomach and intestines  It is caused by bacteria, parasites, or viruses  What increases my risk for gastroenteritis? · Close contact with an infected person or animal    · Food poisoning, such as from eggs, raw vegetables, shellfish, or meat that is not fully cooked    · Drinking water that is not clean, such as when you camp or travel  What are the signs and symptoms of gastroenteritis? · Diarrhea or gas    · Nausea, vomiting, or poor appetite    · Abdominal cramps, pain, or gurgling    · Fever    · Tiredness or weakness    · Headaches or muscle aches with any of the above symptoms  How is gastroenteritis diagnosed and treated? Your healthcare provider will examine you  He will check for signs of dehydration  He will ask you how often you are vomiting or have diarrhea  You may need a blood or bowel movement sample tested for the germ causing your gastroenteritis  Gastroenteritis often clears up on its own  Medicines may be given to slow or stop your diarrhea or vomiting  You may also need medicines to treat an infection caused by bacteria or a parasite  How can I manage my gastroenteritis? · Drink liquids as directed    Ask your healthcare provider how much liquid to drink each day, and which liquids are best for you  You may also need to drink an oral rehydration solution (ORS)  An ORS has the right amounts of sugar, salt, and minerals in water to replace body fluids  · Eat bland foods  When you feel hungry, begin eating soft, bland foods  Examples are bananas, clear soup, potatoes, and applesauce  Do not have dairy products, alcohol, sugary drinks, or drinks with caffeine until you feel better  · Rest as much as possible  Slowly start to do more each day when you begin to feel better  How can I prevent gastroenteritis? Gastroenteritis can spread easily  Keep yourself, your family, and your surroundings clean to help prevent the spread of gastroenteritis:  · Wash your hands often  Use soap and water  Wash your hands after you use the bathroom, change a child's diapers, or sneeze  Wash your hands before you prepare or eat food  · Clean surfaces and do laundry often  Wash your clothes and towels separately from the rest of the laundry  Clean surfaces in your home with antibacterial  or bleach  · Clean food thoroughly and cook safely  Wash raw vegetables before you cook  Cook meat, fish, and eggs fully  Do not use the same dishes for raw meat as you do for other foods  Refrigerate any leftover food immediately  · Be aware when you camp or travel  Drink only clean water  Do not drink from rivers or lakes unless you purify or boil the water first  When you travel, drink bottled water and do not add ice  Do not eat fruit that has not been peeled  Do not eat raw fish or meat that is not fully cooked  Call 911 for any of the following:   · You have trouble breathing or a very fast pulse  When should I seek immediate care? · You see blood in your diarrhea  · You cannot stop vomiting  · You have not urinated for 12 hours  · You feel like you are going to faint  When should I contact my healthcare provider?    · You have a fever  · You continue to vomit or have diarrhea, even after treatment  · You see worms in your diarrhea  · Your mouth or eyes are dry  You are not urinating as much or as often  · You have questions or concerns about your condition or care  CARE AGREEMENT:   You have the right to help plan your care  Learn about your health condition and how it may be treated  Discuss treatment options with your caregivers to decide what care you want to receive  You always have the right to refuse treatment  The above information is an  only  It is not intended as medical advice for individual conditions or treatments  Talk to your doctor, nurse or pharmacist before following any medical regimen to see if it is safe and effective for you  © 2017 [employer] Information is for End User's use only and may not be sold, redistributed or otherwise used for commercial purposes  All illustrations and images included in CareNotes® are the copyrighted property of [employer]  or [employer]

## 2018-01-24 NOTE — ED PROVIDER NOTES
History  Chief Complaint   Patient presents with    Vomiting     Pt presents to the ED with upper abd pain onset last night followed by severe vomiting and diarrhea multiple recurrent episodes  Difficult in keeping PO intake down  59-year-old female with past medical history of asthma, presents emergency department for epigastric and right upper quadrant abdominal pain with associated nausea and vomiting  and diarrhea beginning last night  Patient states that the pain is intermittent, aching, sharp and a 5/10 pain  Nothing makes the pain better or worse  Has had 8 episodes of non-bloody emesis  Has had numerous episodes of non-bloody watery diarrhea  Denies any contacts with people with similar sx  Denies fevers, chills, chest pain, SOB, urinary pain, frequency, urgency  Finished course of macrobid approximately 1 week ago that was for UTI  History provided by:  Patient   used: No    Vomiting   Associated symptoms: abdominal pain and diarrhea    Associated symptoms: no chills, no cough and no fever        Prior to Admission Medications   Prescriptions Last Dose Informant Patient Reported? Taking? FLUoxetine (PROzac) 40 MG capsule   Yes No   Sig: Take 40 mg by mouth daily   Suvorexant (BELSOMRA) 20 MG TABS   Yes No   Sig: Take 20 mg by mouth daily as needed   albuterol (ACCUNEB) 1 25 MG/3ML nebulizer solution   No No   Sig: Take 3 mL by nebulization every 6 (six) hours as needed for wheezing   albuterol (PROVENTIL HFA,VENTOLIN HFA) 90 mcg/act inhaler   Yes No   Sig: Inhale 2 puffs every 6 (six) hours as needed for wheezing  fexofenadine (ALLEGRA) 30 MG tablet   Yes No   Sig: Take 30 mg by mouth 2 (two) times a day        Facility-Administered Medications: None       Past Medical History:   Diagnosis Date    Asthma     Chronic pain     Depression     Insomnia     Seasonal allergies        Past Surgical History:   Procedure Laterality Date    NO PAST SURGERIES      UPPER GASTROINTESTINAL ENDOSCOPY         History reviewed  No pertinent family history  I have reviewed and agree with the history as documented  Social History   Substance Use Topics    Smoking status: Never Smoker    Smokeless tobacco: Never Used    Alcohol use No        Review of Systems   Constitutional: Negative for chills and fever  Respiratory: Negative for cough, chest tightness, shortness of breath and wheezing  Cardiovascular: Negative for chest pain, palpitations and leg swelling  Gastrointestinal: Positive for abdominal pain, diarrhea, nausea and vomiting  Negative for anal bleeding, blood in stool, constipation and rectal pain  Genitourinary: Negative for dysuria, flank pain, frequency, pelvic pain and urgency  Musculoskeletal: Negative  Skin: Negative  Physical Exam  ED Triage Vitals [01/23/18 1721]   Temperature Pulse Respirations Blood Pressure SpO2   98 7 °F (37 1 °C) 83 18 151/74 99 %      Temp src Heart Rate Source Patient Position - Orthostatic VS BP Location FiO2 (%)   -- Monitor Sitting Right arm --      Pain Score       5           Orthostatic Vital Signs  Vitals:    01/23/18 1721   BP: 151/74   Pulse: 83   Patient Position - Orthostatic VS: Sitting       Physical Exam   Constitutional: She is oriented to person, place, and time  She appears well-developed and well-nourished  HENT:   Mouth/Throat: Oropharynx is clear and moist    Eyes: Conjunctivae and EOM are normal  Pupils are equal, round, and reactive to light  Neck: Normal range of motion  Neck supple  Cardiovascular: Normal rate, regular rhythm and intact distal pulses  Pulmonary/Chest: Effort normal and breath sounds normal  She has no wheezes  She has no rales  Abdominal: Soft  Bowel sounds are normal  She exhibits no distension  There is no rebound and no guarding  Obese abdomen  + Moderate tenderness with palpation to the RUQ and epigastric abdomen  No CVA tenderness     Musculoskeletal: Normal range of motion  She exhibits no edema or tenderness  Neurological: She is alert and oriented to person, place, and time  No cranial nerve deficit or sensory deficit  She exhibits normal muscle tone  Coordination normal    Skin: Skin is warm and dry  Capillary refill takes less than 2 seconds  Psychiatric: She has a normal mood and affect  Her behavior is normal    Nursing note and vitals reviewed  ED Medications  Medications   sodium chloride 0 9 % bolus 1,000 mL (0 mL Intravenous Stopped 1/23/18 2317)   ondansetron (ZOFRAN) injection 4 mg (4 mg Intravenous Given 1/23/18 2033)   dicyclomine (BENTYL) tablet 20 mg (20 mg Oral Given 1/23/18 2035)   iohexol (OMNIPAQUE) 350 MG/ML injection (MULTI-DOSE) 100 mL (100 mL Intravenous Given 1/23/18 2124)       Diagnostic Studies  Results Reviewed     Procedure Component Value Units Date/Time    Comprehensive metabolic panel [[id number]]  (Abnormal) Collected:  01/23/18 2031    Lab Status:  Final result Specimen:  Blood from Arm, Right Updated:  01/23/18 2115     Sodium 138 mmol/L      Potassium 3 6 mmol/L      Chloride 102 mmol/L      CO2 28 mmol/L      Anion Gap 8 mmol/L      BUN 8 mg/dL      Creatinine 0 72 mg/dL      Glucose 81 mg/dL      Calcium 8 4 mg/dL      AST 8 U/L      ALT 23 U/L      Alkaline Phosphatase 56 U/L      Total Protein 7 7 g/dL      Albumin 2 9 (L) g/dL      Total Bilirubin 0 40 mg/dL      eGFR 111 ml/min/1 73sq m     Narrative:         National Kidney Disease Education Program recommendations are as follows:  GFR calculation is accurate only with a steady state creatinine  Chronic Kidney disease less than 60 ml/min/1 73 sq  meters  Kidney failure less than 15 ml/min/1 73 sq  meters      Lipase [[id number]]  (Normal) Collected:  01/23/18 2031    Lab Status:  Final result Specimen:  Blood from Arm, Right Updated:  01/23/18 2115     Lipase 73 u/L     CBC and differential [[id number]]  (Normal) Collected:  01/23/18 2031    Lab Status:  Final result Specimen: Blood from Arm, Right Updated:  01/23/18 2051     WBC 6 23 Thousand/uL      RBC 3 88 Million/uL      Hemoglobin 11 9 g/dL      Hematocrit 36 7 %      MCV 95 fL      MCH 30 7 pg      MCHC 32 4 g/dL      RDW 12 0 %      MPV 9 8 fL      Platelets 052 Thousands/uL      Neutrophils Relative 64 %      Lymphocytes Relative 27 %      Monocytes Relative 6 %      Eosinophils Relative 3 %      Basophils Relative 0 %      Neutrophils Absolute 4 00 Thousands/µL      Lymphocytes Absolute 1 71 Thousands/µL      Monocytes Absolute 0 34 Thousand/µL      Eosinophils Absolute 0 17 Thousand/µL      Basophils Absolute 0 01 Thousands/µL     UA w Reflex to Microscopic w Reflex to Culture [[id number]]  (Normal) Collected:  01/23/18 2018    Lab Status:  Final result Specimen:  Urine from Urine, Clean Catch Updated:  01/23/18 2024     Color, UA Yellow     Clarity, UA Slightly Cloudy     Specific Gravity, UA 1 025     pH, UA 5 5     Leukocytes, UA Negative     Nitrite, UA Negative     Protein, UA Negative mg/dl      Glucose, UA Negative mg/dl      Ketones, UA Negative mg/dl      Urobilinogen, UA 0 2 E U /dl      Bilirubin, UA Negative     Blood, UA Negative    POCT pregnancy, urine [[id number]]  (Normal) Resulted:  01/23/18 2017    Lab Status:  Final result Updated:  01/23/18 2017     EXT PREG TEST UR (Ref: Negative) negative                 CT abdomen pelvis with contrast   Final Result by Feliciano Narayan DO (01/23 2142)      Stable CT scan compared to 1/2/2015  Nonspecific soft tissue density in the upper abdomen anterior to the aorta  Stable retroperitoneal adenopathy  These findings are unchanged compared to several prior examinations  No acute abdominal or pelvic pathology  Workstation performed: [id number]                    Procedures  Procedures       Phone Contacts  ED Phone Contact    ED Course  ED Course as of Jan 24 0929   Tue Jan 23, 2018 2145 Patient feels improved following zofran and bentyl and fluids  MDM  Number of Diagnoses or Management Options  Diagnosis management comments:  70-year-old female with past medical history of asthma, presents emergency department for epigastric and right upper quadrant abdominal pain with associated nausea and vomiting  and diarrhea beginning last night  Differential Diagnosis includes but is not limited to:  Cholecystitis, cholelithiasis, biliary colic, gastritis, H pylori,  Gastroenteritis,   Electrolyte Imbalance  Labs  Generally unremarkable  No leukocytosis  Electrolytes are unremarkable  CT Abdomen pelvis negative for cholecystitis, appendicitis  UA negative for infection  Patient feels improved following zofran and bentyl, IV fluids  Tolerating po intake  Likely gastroenteritis  Patient has not had vomiting or diarrhea in the Ed  Will be discharged home  Amount and/or Complexity of Data Reviewed  Clinical lab tests: ordered and reviewed  Tests in the radiology section of CPT®: ordered and reviewed      CritCare Time    Disposition  Final diagnoses:   Gastroenteritis     Time reflects when diagnosis was documented in both MDM as applicable and the Disposition within this note     Time User Action Codes Description Comment    1/23/2018 10:55 PM Dasha Mcmanus Add [K52 9] Gastroenteritis       ED Disposition     ED Disposition Condition Comment    Discharge  [address] discharge to home/self care      Condition at discharge: Good        Follow-up Information     Follow up With Specialties Details Why 2605 Tevin Burciaga, DO Internal Medicine In 1 week If symptoms worsen [address]  [address] [place] [address], [address] [place] [address],[address]  [phone]          Discharge Medication List as of 1/23/2018 10:58 PM      START taking these medications    Details   dicyclomine (BENTYL) 10 mg capsule Take 1 capsule by mouth 3 (three) times a day before meals for 3 days, Starting Tue 1/23/2018, Until Fri 1/26/2018, Print      ondansetron (ZOFRAN) 4 mg tablet Take 1 tablet by mouth every 8 (eight) hours as needed for nausea or vomiting, Starting Tue 1/23/2018, Print         CONTINUE these medications which have NOT CHANGED    Details   albuterol (ACCUNEB) 1 25 MG/3ML nebulizer solution Take 3 mL by nebulization every 6 (six) hours as needed for wheezing, Starting Fri 6/16/2017, Print      albuterol (PROVENTIL HFA,VENTOLIN HFA) 90 mcg/act inhaler Inhale 2 puffs every 6 (six) hours as needed for wheezing , Until Discontinued, Historical Med      fexofenadine (ALLEGRA) 30 MG tablet Take 30 mg by mouth 2 (two) times a day , Until Discontinued, Historical Med      FLUoxetine (PROzac) 40 MG capsule Take 40 mg by mouth daily, Historical Med      Suvorexant (BELSOMRA) 20 MG TABS Take 20 mg by mouth daily as needed, Historical Med           No discharge procedures on file      ED Provider  Electronically Signed by           Maribel Cruz PA-C  01/24/18 2677

## 2018-01-25 ENCOUNTER — APPOINTMENT (OUTPATIENT)
Dept: LAB | Facility: CLINIC | Age: 33
End: 2018-01-25
Payer: COMMERCIAL

## 2018-01-25 ENCOUNTER — OFFICE VISIT (OUTPATIENT)
Dept: INTERNAL MEDICINE CLINIC | Facility: CLINIC | Age: 33
End: 2018-01-25
Payer: COMMERCIAL

## 2018-01-25 VITALS
BODY MASS INDEX: 50.02 KG/M2 | OXYGEN SATURATION: 100 % | DIASTOLIC BLOOD PRESSURE: 80 MMHG | HEIGHT: 64 IN | SYSTOLIC BLOOD PRESSURE: 106 MMHG | HEART RATE: 72 BPM | WEIGHT: 293 LBS | RESPIRATION RATE: 18 BRPM | TEMPERATURE: 97.5 F

## 2018-01-25 DIAGNOSIS — R19.7 DIARRHEA, UNSPECIFIED TYPE: Primary | ICD-10-CM

## 2018-01-25 PROCEDURE — 87493 C DIFF AMPLIFIED PROBE: CPT | Performed by: INTERNAL MEDICINE

## 2018-01-25 PROCEDURE — 99213 OFFICE O/P EST LOW 20 MIN: CPT | Performed by: INTERNAL MEDICINE

## 2018-01-25 NOTE — PROGRESS NOTES
Assessment/Plan:    No problem-specific Assessment & Plan notes found for this encounter  1  Diarrhea, unspecified type      r/o C dif due to recent abx use  Continue BRAT diet and replete fluids with gatorade/broth/pedialyte       Diarrhea, unspecified type    Other orders  -     Etonogestrel (NEXPLANON) 68 MG IMPL; Inject under the skin        Subjective:      Patient ID: Anahi Stovall is a 28 y o  female  35yo female with MDD, morbid obesity, asthma here for follow up ED visit  She developed intense abd pain four days ago associated with vomiting, burping and nonbloody diarrhea  She was evaluated in ED 1/23/18, given IVF, zofran and bentyl  CT a/p was neg for GB disease  Labs were unremarkable  She was dx acute gastroenteritis  She has eaten jello, toasted bread, broth without improvement  She reports fever initially, temp 102, chills and sweats, non in the past 48hrs  Was placed on macrobid the week prior for a UTI      Diarrhea    This is a new problem  The current episode started in the past 7 days  The problem occurs 5 to 10 times per day  The problem has been unchanged  The stool consistency is described as watery  Associated symptoms include abdominal pain, bloating, chills and vomiting  Pertinent negatives include no headaches  Nothing aggravates the symptoms  Risk factors include recent antibiotic use  She has tried increased fluids for the symptoms  The treatment provided no relief  The following portions of the patient's history were reviewed and updated as appropriate: allergies, current medications, past family history, past medical history, past social history, past surgical history and problem list     Review of Systems   Constitutional: Positive for chills and fatigue  Respiratory: Negative  Cardiovascular: Negative  Gastrointestinal: Positive for abdominal pain, bloating, diarrhea, nausea, rectal pain and vomiting  Negative for blood in stool          Reflux Genitourinary: Negative  Neurological: Positive for dizziness  Negative for headaches  Objective:  /80 (BP Location: Left arm, Patient Position: Sitting, Cuff Size: Adult)   Pulse 72   Temp 97 5 °F (36 4 °C)   Resp 18   Ht 5' 4" (1 626 m)   Wt (!) 192 kg (424 lb)   SpO2 100%   BMI 72 78 kg/m²      Physical Exam   Constitutional: She is oriented to person, place, and time  HENT:   Mouth/Throat: Oropharynx is clear and moist  No oropharyngeal exudate  Cardiovascular: Normal rate, regular rhythm and normal heart sounds  Pulmonary/Chest: Effort normal and breath sounds normal    Abdominal: Soft  Bowel sounds are normal  There is tenderness in the right upper quadrant  Neurological: She is alert and oriented to person, place, and time  Vitals reviewed

## 2018-01-25 NOTE — PATIENT INSTRUCTIONS
Acute Diarrhea   AMBULATORY CARE:   Acute diarrhea  starts quickly and lasts a short time, usually 1 to 3 days  It can last up to 2 weeks  Signs and symptoms that may happen with diarrhea:  You may have 3 or more episodes of diarrhea  It may be hard to control your diarrhea  You may also have any of the following:  · Fever and chills    · Headache or abdominal pain    · Nausea and vomiting    · Symptoms of dehydration such as thirst, decreased urination, dry skin, sunken eyes, or fast, pounding heartbeat  Seek care immediately if:   · You feel confused  · Your heartbeat is faster than normal      · Your eyes look deeply sunken, or you have no tears when you cry  · You urinate less than usual, or your urine is dark yellow  · You have blood or mucus in your stools  · You have severe abdominal pain  · You are unable to drink any liquids  Contact your healthcare provider if:  · Your symptoms do not get better with treatment  · You have a fever higher than 101 3°F (38 5°C)  · You have trouble eating and drinking because you are vomiting  · You are thirsty or have a dry mouth  · Your diarrhea does not get better in 7 days  · You have questions or concerns about your condition or care  Treatment for acute diarrhea  may include medicines to slow or stop your diarrhea  You may also need medicine to treat an infection  Self-care:   · Drink liquids as directed  Liquids will help prevent dehydration caused by diarrhea  Ask your healthcare provider how much liquid to drink each day and which liquids are best for you  You may need to drink an oral rehydration solution (ORS)  An ORS has the right amounts of water, salts, and sugar you need to replace body fluids  You can buy an ORS at most grocery stores and pharmacies  · Eat foods that are easy to digest   Examples include rice, lentils, cereal, bananas, potatoes, and bread   It also includes some fruits (bananas, melon), well-cooked vegetables, and lean meats  Avoid foods high in fiber, fat, and sugar  Also avoid caffeine, alcohol, dairy, and red meat until your diarrhea is gone  Prevent diarrhea:   · Wash your hands often  Use soap and water  Wash your hands before you eat or prepare food  Also wash your hands after you use the bathroom  Use an alcohol-based hand gel when soap and water are not available  · Keep bathroom surfaces clean  This helps prevent the spread of germs that cause acute diarrhea  · Wash fruits and vegetables well before you eat them  This can help remove germs that cause diarrhea  If possible, remove the skin from fruits and vegetables, or cook them well before you eat them  · Cook meat as directed  ¨ Cook ground meat  to 160°F      ¨ Cook ground poultry, whole poultry, or cuts of poultry  to at least 165°F  Remove the meat from heat  Let it stand for 3 minutes before you eat it  ¨ Cook whole cuts of meat other than poultry  to at least 145°F  Remove the meat from heat  Let it stand for 3 minutes before you eat it  · Wash dishes that have touched raw meat with hot water and soap  This includes cutting boards, utensils, dishes, and serving containers  · Place raw or cooked meat in the refrigerator as soon as possible  Bacteria can grow in meat that is left at room temperature too long  · Do not eat raw or undercooked oysters, clams, or mussels  These foods may be contaminated and cause infection  · Drink filtered or treated water only when you travel  Do not put ice in your drinks  Drink bottled water whenever possible  Follow up with your healthcare provider as directed:  Write down your questions so you remember to ask them during your visits  © 2017 [facility] Information is for End User's use only and may not be sold, redistributed or otherwise used for commercial purposes   All illustrations and images included in CareNotes® are the copyrighted property of [employer]  or Gregor Cohen  The above information is an  only  It is not intended as medical advice for individual conditions or treatments  Talk to your doctor, nurse or pharmacist before following any medical regimen to see if it is safe and effective for you

## 2018-01-26 LAB — C DIFF TOX GENS STL QL NAA+PROBE: ABNORMAL

## 2018-01-29 ENCOUNTER — TELEPHONE (OUTPATIENT)
Dept: INTERNAL MEDICINE CLINIC | Facility: CLINIC | Age: 33
End: 2018-01-29

## 2018-01-29 DIAGNOSIS — A49.8 CLOSTRIDIUM DIFFICILE INFECTION: Primary | ICD-10-CM

## 2018-01-29 RX ORDER — METRONIDAZOLE 500 MG/1
500 TABLET ORAL EVERY 8 HOURS SCHEDULED
Qty: 42 TABLET | Refills: 0 | Status: SHIPPED | OUTPATIENT
Start: 2018-01-29 | End: 2018-02-12

## 2018-01-29 NOTE — TELEPHONE ENCOUNTER
Patient called and stated that her pharmacy notified her that an antibiotic was ready for her to  but she never received a call on the results from her stool culture    Please call her

## 2018-02-07 PROBLEM — G47.00 INSOMNIA: Status: ACTIVE | Noted: 2017-07-18

## 2018-02-07 PROBLEM — F50.81 BINGE-EATING DISORDER, SEVERE: Status: ACTIVE | Noted: 2017-06-27

## 2018-02-07 PROBLEM — F33.1 MODERATE EPISODE OF RECURRENT MAJOR DEPRESSIVE DISORDER (HCC): Status: ACTIVE | Noted: 2017-06-28

## 2018-03-07 NOTE — PSYCH
History of Present Illness    Discharge Summary: Admission Date: 6/27/17 Discharge Date: 10/17/17  Stopped treatment, letter sent with no response  Discharge Diagnosis:    Axis I: Binge eating disorder, severe; depression, unspecified  Presenting Problem/Pertinent findings:  [place] eating, transition process for Bariatric surgery  Course of treatment includes  individual counseling  Treatment Progress: Presented for evaluation for bariatric surgery  Expressed she didn't feel further therapy was necessary once cleared  The consumer achieved most of their goals  Criteria for Discharge: The patient has   Presented for evaluation for biartric surgery  Expressed she didn;t feel further therapy was neceaasry once cleared  Aftercare recommendations include:  Ainsley Henao is encouraged to return to therapy if/when she feels the need  Active Problems    1  Asthma (493 90) (J45 909)   2  Binge-eating disorder, severe (783 6) (F50 81)   3  Depression (311) (F32 9)   4  Edema (782 3) (R60 9)   5  Headache (784 0) (R51)   6  Inflammatory disease of cervix, vagina, and vulva (616 9) (N73 9)   7  Insomnia (780 52) (G47 00)   8  Left lumbar radiculopathy (724 4) (M54 16)   9  Moderate episode of recurrent major depressive disorder (296 32) (F33 1)   10  Morbid or severe obesity due to excess calories (278 01) (E66 01)   11  Palpitations (785 1) (R00 2)   12  Screening for cardiovascular condition (V81 2) (Z13 6)   13  Screening for depression (V79 0) (Z13 89)   14  Screening for diabetes mellitus (DM) (V77 1) (Z13 1)   15  Screening for thyroid disorder (V77 0) (Z13 29)   16  Sebaceous cyst (706 2) (L72 3)   17  Shortness of breath (786 05) (R06 02)   18  Varicose Veins Of Lower Extremities (454 9)    Past Medical History    1   History of Major depressive disorder, recurrent episode, severe (296 33) (F33 2)    Social History    · Denied: History of Alcohol use   · Denied: History of Drug use   · Former smoker (V15 82) (Z87 666)   · Occupation   · Single    Allergies    1  No Known Drug Allergies    2  Dust   3  Grass   4  Pollen   5  Ragweed   6  Seasonal    Current Meds   1  Allegra Allergy 60 MG Oral Tablet (Fexofenadine HCl); Therapy: [id number] to Recorded   2  Belsomra 20 MG Oral Tablet; TAKE 1 TABLET AT  BEDTIME AS NEEDED FOR INSOMNIA; Therapy: 84TXD3255 to (Evaluate:16Oct2017); Last Rx:[id number] Ordered   3  FLUoxetine HCl - 20 MG Oral Capsule; take 1 capsule by daily for two weeks then 2   capsules daily thereafter; Therapy: 29WGR4619 to (Evaluate:17Aug2017)  Requested for: 08Dwn0396; Last   Rx:[id number] Ordered   4  FLUoxetine HCl - 40 MG Oral Capsule; Take 1 capsule by mouth daily; Therapy: 35Kys8924 to (Evaluate:08Apx2342)  Requested for: 16KPV1744; Last   Rx:[id number]; Status: ACTIVE - Renewal Denied Ordered   5  Nexplanon 68 MG Subcutaneous Implant; Therapy: (Recorded:43Kwj7026) to Recorded   6  Ventolin  (90 Base) MCG/ACT Inhalation Aerosol Solution;    Therapy: (Recorded:05Szl9153) to Recorded    Signatures   Electronically signed by : Kandis Gay LCSW; Dec 15 2017  8:53AM EST                       (Author)    Electronically signed by : GERBER Huffman ; Dec 18 2017 12:51PM EST                       (Author)

## 2018-05-11 ENCOUNTER — TRANSITIONAL CARE MANAGEMENT (OUTPATIENT)
Dept: INTERNAL MEDICINE CLINIC | Facility: CLINIC | Age: 33
End: 2018-05-11

## 2018-05-11 ENCOUNTER — OFFICE VISIT (OUTPATIENT)
Dept: INTERNAL MEDICINE CLINIC | Facility: CLINIC | Age: 33
End: 2018-05-11
Payer: COMMERCIAL

## 2018-05-11 VITALS
DIASTOLIC BLOOD PRESSURE: 90 MMHG | WEIGHT: 293 LBS | HEART RATE: 87 BPM | SYSTOLIC BLOOD PRESSURE: 134 MMHG | OXYGEN SATURATION: 98 % | BODY MASS INDEX: 50.02 KG/M2 | TEMPERATURE: 98.1 F | RESPIRATION RATE: 16 BRPM | HEIGHT: 64 IN

## 2018-05-11 DIAGNOSIS — J18.9 COMMUNITY ACQUIRED PNEUMONIA OF RIGHT LOWER LOBE OF LUNG: Primary | ICD-10-CM

## 2018-05-11 DIAGNOSIS — J45.20 INTERMITTENT ASTHMA WITHOUT COMPLICATION, UNSPECIFIED ASTHMA SEVERITY: ICD-10-CM

## 2018-05-11 PROCEDURE — 3008F BODY MASS INDEX DOCD: CPT | Performed by: INTERNAL MEDICINE

## 2018-05-11 PROCEDURE — 99213 OFFICE O/P EST LOW 20 MIN: CPT | Performed by: INTERNAL MEDICINE

## 2018-05-11 RX ORDER — ALBUTEROL SULFATE 90 UG/1
2 AEROSOL, METERED RESPIRATORY (INHALATION) EVERY 6 HOURS PRN
Qty: 18 G | Refills: 2 | Status: SHIPPED | OUTPATIENT
Start: 2018-05-11 | End: 2021-10-24 | Stop reason: HOSPADM

## 2018-05-11 RX ORDER — ALBUTEROL SULFATE 2.5 MG/3ML
SOLUTION RESPIRATORY (INHALATION)
Refills: 1 | COMMUNITY
Start: 2018-05-08

## 2018-05-11 RX ORDER — PREDNISONE 20 MG/1
TABLET ORAL
Refills: 0 | COMMUNITY
Start: 2018-05-08 | End: 2018-11-17 | Stop reason: ALTCHOICE

## 2018-05-11 RX ORDER — AZITHROMYCIN 250 MG/1
TABLET, FILM COATED ORAL
Refills: 0 | COMMUNITY
Start: 2018-05-08 | End: 2018-11-17 | Stop reason: ALTCHOICE

## 2018-05-11 NOTE — ASSESSMENT & PLAN NOTE
15mm density noted on RLL, on treatment with azithromycin and prednisone  Albuterol rescue inhaler refilled  Obtain CXR in 6 weeks for resolution  Give pneumovax in the future  Call if symptoms do not improve

## 2018-05-11 NOTE — PROGRESS NOTES
Assessment/Plan:    Community acquired pneumonia of right lung  15mm density noted on RLL, on treatment with azithromycin and prednisone  Albuterol rescue inhaler refilled  Obtain CXR in 6 weeks for resolution  Give pneumovax in the future  Call if symptoms do not improve  1  Community acquired pneumonia of right lower lobe of lung (HCC)  XR chest pa & lateral   2  Intermittent asthma without complication, unspecified asthma severity  albuterol (PROVENTIL HFA,VENTOLIN HFA) 90 mcg/act inhaler       Subjective:      Patient ID: Otto Hamilton is a 35 y o  female  HPI   32yo female with MDD, morbid obesity and asthma here for follow up Urgent Care visit  She is accompanied by her boyfriend  She developed a cough and itchy throat four days ago, worsened and by the next day she had difficulty breathing and fatigue  [facility], [address] revealed 15mm nodular density at R lung base at anterior margin of the R fifth rib medially projecting over the lower mid aspect of the heart on lateral projection, may represent focal round pneumonia  She received duoneb, prednisone and azithromycin, last dose tomorrow  She still feels congested, has productive cough  She has not needed to use her neb machine since  She denies fever, n/v/d  Reports her daughter had been sick prior to her feeling ill      The following portions of the patient's history were reviewed and updated as appropriate: allergies, current medications, past family history, past medical history, past social history, past surgical history and problem list     Current Outpatient Prescriptions:     albuterol (2 5 mg/3 mL) 0 083 % nebulizer solution, INHALE 1 VIAL VIA NEBULIZER EVERY 4 HOURS AS NEEDED, Disp: , Rfl: 1    albuterol (PROVENTIL HFA,VENTOLIN HFA) 90 mcg/act inhaler, Inhale 2 puffs every 6 (six) hours as needed for wheezing, Disp: 18 g, Rfl: 2    azithromycin (ZITHROMAX) 250 mg tablet, TAKE 2 TABLETS BY MOUTH TODAY, THEN TAKE 1 TABLET DAILY FOR 4 DAYS, Disp: , Rfl: 0    Etonogestrel (NEXPLANON) 68 MG IMPL, Inject under the skin, Disp: , Rfl:     fexofenadine (ALLEGRA) 30 MG tablet, Take 30 mg by mouth 2 (two) times a day , Disp: , Rfl:     predniSONE 20 mg tablet, TAKE 3 TABLETS BY MOUTH EVERY DAY WITH FOOD, Disp: , Rfl: 0    dicyclomine (BENTYL) 10 mg capsule, Take 1 capsule by mouth 3 (three) times a day before meals for 3 days, Disp: 9 capsule, Rfl: 0    ondansetron (ZOFRAN) 4 mg tablet, Take 1 tablet by mouth every 8 (eight) hours as needed for nausea or vomiting, Disp: 20 tablet, Rfl: 0    Review of Systems   Constitutional: Positive for fatigue  Negative for fever  HENT: Positive for congestion, postnasal drip and rhinorrhea  Respiratory: Positive for cough and shortness of breath  Negative for wheezing  Cardiovascular: Negative  Gastrointestinal: Negative  Neurological: Positive for headaches  Negative for dizziness  Objective:    /90 (BP Location: Left arm, Patient Position: Sitting, Cuff Size: Standard)   Pulse 87   Temp 98 1 °F (36 7 °C)   Resp 16   Ht 5' 4" (1 626 m)   Wt (!) 195 kg (428 lb 12 8 oz)   SpO2 98%   BMI 73 60 kg/m²      Physical Exam   Constitutional: She appears well-developed and well-nourished  No distress  HENT:   Right Ear: External ear normal    Left Ear: External ear normal    Nose: Nose normal    Mouth/Throat: Oropharynx is clear and moist  No oropharyngeal exudate  Cardiovascular: Normal rate, regular rhythm and normal heart sounds  Pulmonary/Chest: Effort normal and breath sounds normal  No respiratory distress  She has no wheezes  Moist cough   Lymphadenopathy:     She has no cervical adenopathy  Vitals reviewed

## 2018-11-16 ENCOUNTER — HOSPITAL ENCOUNTER (EMERGENCY)
Facility: HOSPITAL | Age: 33
Discharge: HOME | End: 2018-11-17
Attending: EMERGENCY MEDICINE | Admitting: EMERGENCY MEDICINE
Payer: COMMERCIAL

## 2018-11-16 VITALS
HEART RATE: 109 BPM | WEIGHT: 293 LBS | RESPIRATION RATE: 18 BRPM | DIASTOLIC BLOOD PRESSURE: 65 MMHG | SYSTOLIC BLOOD PRESSURE: 137 MMHG | OXYGEN SATURATION: 98 % | BODY MASS INDEX: 52.56 KG/M2 | TEMPERATURE: 99.2 F

## 2018-11-16 DIAGNOSIS — L02.212 ABSCESS OF LOWER BACK: Primary | ICD-10-CM

## 2018-11-16 PROCEDURE — 99283 EMERGENCY DEPT VISIT LOW MDM: CPT

## 2018-11-17 PROCEDURE — 87205 SMEAR GRAM STAIN: CPT | Performed by: PHYSICIAN ASSISTANT

## 2018-11-17 PROCEDURE — 87070 CULTURE OTHR SPECIMN AEROBIC: CPT | Performed by: PHYSICIAN ASSISTANT

## 2018-11-17 RX ORDER — BUPIVACAINE HYDROCHLORIDE 5 MG/ML
10 INJECTION, SOLUTION EPIDURAL; INTRACAUDAL ONCE
Status: COMPLETED | OUTPATIENT
Start: 2018-11-17 | End: 2018-11-17

## 2018-11-17 RX ORDER — CEPHALEXIN 500 MG/1
500 CAPSULE ORAL EVERY 8 HOURS SCHEDULED
Qty: 30 CAPSULE | Refills: 0 | Status: SHIPPED | OUTPATIENT
Start: 2018-11-17 | End: 2018-11-27

## 2018-11-17 RX ORDER — IBUPROFEN 600 MG/1
600 TABLET ORAL ONCE
Status: COMPLETED | OUTPATIENT
Start: 2018-11-17 | End: 2018-11-17

## 2018-11-17 RX ORDER — CEPHALEXIN 250 MG/1
500 CAPSULE ORAL ONCE
Status: COMPLETED | OUTPATIENT
Start: 2018-11-17 | End: 2018-11-17

## 2018-11-17 RX ORDER — ACETAMINOPHEN 325 MG/1
650 TABLET ORAL ONCE
Status: COMPLETED | OUTPATIENT
Start: 2018-11-17 | End: 2018-11-17

## 2018-11-17 RX ORDER — LIDOCAINE HYDROCHLORIDE AND EPINEPHRINE 10; 10 MG/ML; UG/ML
10 INJECTION, SOLUTION INFILTRATION; PERINEURAL ONCE
Status: COMPLETED | OUTPATIENT
Start: 2018-11-17 | End: 2018-11-17

## 2018-11-17 RX ADMIN — LIDOCAINE HYDROCHLORIDE AND EPINEPHRINE 10 ML: 10; 10 INJECTION, SOLUTION INFILTRATION; PERINEURAL at 00:51

## 2018-11-17 RX ADMIN — BUPIVACAINE HYDROCHLORIDE 10 ML: 5 INJECTION, SOLUTION EPIDURAL; INTRACAUDAL; PERINEURAL at 00:51

## 2018-11-17 RX ADMIN — CEPHALEXIN 500 MG: 250 CAPSULE ORAL at 01:31

## 2018-11-17 RX ADMIN — ACETAMINOPHEN 650 MG: 325 TABLET ORAL at 01:31

## 2018-11-17 RX ADMIN — IBUPROFEN 600 MG: 600 TABLET ORAL at 01:32

## 2018-11-17 NOTE — DISCHARGE INSTRUCTIONS
Abscess   WHAT YOU NEED TO KNOW:   A warm compress may help your abscess drain  Your healthcare provider may make a cut in the abscess so it can drain  You may need surgery to remove an abscess that is on your hands or buttocks  DISCHARGE INSTRUCTIONS:   Return to the emergency department if:   · The area around your abscess becomes very painful, warm, or has red streaks  · You have a fever and chills  · Your heart is beating faster than usual      · You feel faint or confused  Contact your healthcare provider if:   · Your abscess gets bigger or does not get better  · Your abscess returns  · You have questions or concerns about your condition or care  Medicines: You may  need any of the following:  · Antibiotics  help treat a bacterial infection  · Acetaminophen  decreases pain and fever  It is available without a doctor's order  Ask how much to take and how often to take it  Follow directions  Acetaminophen can cause liver damage if not taken correctly  · NSAIDs , such as ibuprofen, help decrease swelling, pain, and fever  This medicine is available with or without a doctor's order  NSAIDs can cause stomach bleeding or kidney problems in certain people  If you take blood thinner medicine, always ask your healthcare provider if NSAIDs are safe for you  Always read the medicine label and follow directions  · Take your medicine as directed  Contact your healthcare provider if you think your medicine is not helping or if you have side effects  Tell him or her if you are allergic to any medicine  Keep a list of the medicines, vitamins, and herbs you take  Include the amounts, and when and why you take them  Bring the list or the pill bottles to follow-up visits  Carry your medicine list with you in case of an emergency  Self-care:   · Apply a warm compress to your abscess  This will help it open and drain  Wet a washcloth in warm, but not hot, water  Apply the compress for 10 minutes  Repeat this 4 times each day  Do not  press on an abscess or try to open it with a needle  You may push the bacteria deeper or into your blood  · Do not share your clothes, towels, or sheets with anyone  This can spread the infection to others  · Wash your hands often  This can help prevent the spread of germs  Use soap and water or an alcohol-based hand rub  Care for your wound after it is drained:   · Care for your wound as directed  If your healthcare provider says it is okay, carefully remove the bandage and gauze packing  You may need to soak the gauze to get it out of your wound  Clean your wound and the area around it as directed  Dry the area and put on new, clean bandages  Change your bandages when they get wet or dirty  · Ask your healthcare provider how to change the gauze in your wound  Keep track of how many pieces of gauze are placed inside the wound  Do not put too much packing in the wound  Do not pack the gauze too tightly in your wound  Follow up with your healthcare provider in 1 to 3 days: You may need to have your packing removed or your bandage changed  Write down your questions so you remember to ask them during your visits  © 2017 [address] [employer]  Information is for End User's use only and may not be sold, redistributed or otherwise used for commercial purposes  All illustrations and images included in CareNotes® are the copyrighted property of [employer]  or Gregor [employer]  The above information is an  only  It is not intended as medical advice for individual conditions or treatments  Talk to your doctor, nurse or pharmacist before following any medical regimen to see if it is safe and effective for you

## 2018-11-17 NOTE — ED PROVIDER NOTES
History  Chief Complaint   Patient presents with    Abscess     Patient reports abcess on right side of back x 3 days  "I get these all the time ever since I have been losing weight "  No drainage  Patient does reports feeling "not right" for the past few days  "I don't know if it's from this or what "      The patient is a 35year old female, seen unaccompanied, who presents the emergency department a chief complaint of an abscess on her right medial back, laterally  The patient states she has had multiple concerns with abscesses, particularly the past several months, and states that she has lost over 130 lb in the past year, and states that, since doing this, she has had notable extra skin present, and, as such, has had multiple abscesses within the folds of this skin  The patient states that her current abscess has been present for the past 3 days, with her initially noticing this this past Wednesday  The patient states that, on many occasions, these abscesses well self-resolved, and will not require medical intervention  The patient states that, however, on multiple occasions, she has had concerns with these abscesses not self resolving, and, when this occurs, she has had to seek evaluation and have these areas drained  The patient states that, with this, she has not had concerns with fevers, chills, malaise, nausea, vomiting, constipation, or diarrhea  Additionally, the patient states that her discomfort is primarily localized to the area surrounding the abscess  The patient states that she has had multiple abscesses culture in the past, and they have universally presented as pan sensitive to cephalexin  Prior to Admission Medications   Prescriptions Last Dose Informant Patient Reported? Taking?    Etonogestrel (NEXPLANON) 68 MG IMPL 11/17/2018 at Unknown time  Yes Yes   Sig: Inject under the skin   albuterol (2 5 mg/3 mL) 0 083 % nebulizer solution More than a month at Unknown time  Yes No   Sig: INHALE 1 VIAL VIA NEBULIZER EVERY 4 HOURS AS NEEDED   albuterol (PROVENTIL HFA,VENTOLIN HFA) 90 mcg/act inhaler More than a month at Unknown time  No No   Sig: Inhale 2 puffs every 6 (six) hours as needed for wheezing   dicyclomine (BENTYL) 10 mg capsule   No No   Sig: Take 1 capsule by mouth 3 (three) times a day before meals for 3 days   fexofenadine (ALLEGRA) 30 MG tablet More than a month at Unknown time  Yes No   Sig: Take 30 mg by mouth 2 (two) times a day  ondansetron (ZOFRAN) 4 mg tablet More than a month at Unknown time  No No   Sig: Take 1 tablet by mouth every 8 (eight) hours as needed for nausea or vomiting      Facility-Administered Medications: None       Past Medical History:   Diagnosis Date    Asthma     Chronic pain     Depression     Insomnia     Major depressive disorder, recurrent episode, severe (HCC)     Seasonal allergies        Past Surgical History:   Procedure Laterality Date    ABCESS DRAINAGE      Incision and dranage of skin abscess    NO PAST SURGERIES      UPPER GASTROINTESTINAL ENDOSCOPY         Family History   Problem Relation Age of Onset    Other Father         Cardiac disorder    Hypertension Father     Diabetes Maternal Grandmother      I have reviewed and agree with the history as documented  Social History   Substance Use Topics    Smoking status: Never Smoker    Smokeless tobacco: Never Used    Alcohol use No        Review of Systems  Review of Systems: The Patient/Parent Denies the following: Negative, Except as noted in HPI  Physical Exam  Physical Exam  General: 35 y o  female patient, who appears their stated age, in moderate distress  Skin: There is a noted Flucuant, Erythematous, With surrounding erythema, Localized, Without noted drainage, Tender to palpation, Warm to palpation lesion noted to be present on the patients Lateral, Posterior Trunk  The area of concern is Round, and the lesion is 10 cm in Diameter   The rash Was examined with a 10x derm light  A localized skin examination was performed, and no other rashes, masses, or lesions were noted  HEENT: Atraumatic & Normocephalic  External ears normal, with no noted abnormalities or deformities  Bilateral canals examined, without noted edema or discomfort  No pain while pulling the tragus  TM well visualized bilaterally, with no noted obstruction, effusion, erythema, or air fluid levels  No noted enlargement of the mastoid processes bilaterally  EOMI, PERRL, Conjunctiva without injection bilaterally  No conjunctival drainage noted bilaterally  Nares patent bilaterally, with no noted obstructions, erythema, or drainage  No noted rhinorrhea  Pharynx well visualized, with no exudate noted in the posterior pharynx  Tonsils are not enlarged  Gingival surfaces are within normal limits  Neck: Soft, supple, and non-tender  No enlargement of the anterior cervical, posterior cervical, or occipital lymph notes  Cardiac: Regular rate and rhythm, with no noted murmurs, rubs, or gallops  Pulmonary: Normal Appearance  Clear to auscultation, with no noted rales, rhonchi, or wheezes  Abdomen: Normal appearance  Dull to palpation, except over the gastric bubble, which was mildly tympanic  Bowel sounds were within normal limits, with no noted high pitch sounds heard  Negative Flores sign  No pain with palpation at [facility]  MSK: Joint ROM grossly normal, actively and passively, to all extremities  No noted joint swelling  Normal Gait         Vital Signs  ED Triage Vitals   Temperature Pulse Respirations Blood Pressure SpO2   11/16/18 2347 11/16/18 2349 11/16/18 2349 11/16/18 2350 11/16/18 2349   99 2 °F (37 3 °C) (!) 109 18 137/65 98 %      Temp Source Heart Rate Source Patient Position - Orthostatic VS BP Location FiO2 (%)   11/16/18 2347 11/16/18 2349 11/16/18 2349 11/16/18 2349 --   Oral Monitor Sitting Right arm       Pain Score       11/17/18 0131       2           Vitals:    11/16/18 2349 11/16/18 2350   BP:  137/65   Pulse: (!) 109    Patient Position - Orthostatic VS: Sitting        Visual Acuity      ED Medications  Medications   lidocaine-epinephrine (XYLOCAINE/EPINEPHRINE) 1 %-1:100,000 injection 10 mL (10 mL Infiltration Given 11/17/18 0051)   bupivacaine (PF) (MARCAINE) 0 5 % injection 10 mL (10 mL Infiltration Given 11/17/18 0051)   cephalexin (KEFLEX) capsule 500 mg (500 mg Oral Given 11/17/18 0131)   ibuprofen (MOTRIN) tablet 600 mg (600 mg Oral Given 11/17/18 0132)   acetaminophen (TYLENOL) tablet 650 mg (650 mg Oral Given 11/17/18 0131)       Diagnostic Studies  Results Reviewed     Procedure Component Value Units Date/Time    Wound culture and Gram stain [[id number]] Collected:  11/17/18 0124    Lab Status: In process Specimen:  Wound from Back Updated:  11/17/18 0128                 No orders to display              Procedures  Incision/Drainage  Date/Time: 11/17/2018 12:50 AM  Performed by: Ronald Smith  Authorized by: Ronald Smith     Patient location:  ED  Other Assisting Provider: No    Consent:     Consent obtained:  Verbal    Consent given by:  Patient    Risks discussed:  Bleeding, damage to other organs, infection, incomplete drainage and pain    Alternatives discussed:  Delayed treatment, no treatment, alternative treatment, observation and referral  Universal protocol:     Procedure explained and questions answered to patient or proxy's satisfaction: yes      Relevant documents present and verified: yes      Test results available and properly labeled: yes      Radiology Images displayed and confirmed    If images not available, report reviewed: yes      Required blood products, implants, devices, and special equipment available: yes      Site/side marked: yes      Immediately prior to procedure a time out was called: yes      Patient identity confirmed:  Verbally with patient, provided demographic data, hospital-assigned identification number and arm band  Location: Type:  Abscess    Size:  10cm    Location:  Trunk    Trunk location:  Back  Pre-procedure details:     Skin preparation:  Chloraprep  Anesthesia (see MAR for exact dosages): Anesthesia method:  Local infiltration    Local anesthetic:  Lidocaine 1% WITH epi and bupivacaine 0 5% w/o epi  Procedure details:     Complexity:  Intermediate    Needle aspiration: yes      Needle size:  18 G    Incision types:  Stab incision and elliptical    Scalpel blade:  11    Approach:  Open    Incision depth:  Skin, submucosal and subcutaneous    Wound management:  Probed and deloculated, irrigated with saline and extensive cleaning    Irrigation with saline:  10 mL    Hemostat:  Straight    Drainage:  Bloody and purulent    Drainage amount:  Copious    Wound treatment:  Drain placed and packing placed    Packing materials:  1/4 in iodoform gauze    Amount 1/4" iodoform:  13 inches  Post-procedure details:     Patient tolerance of procedure: Tolerated well, no immediate complications  Comments:      Additionally, Yankauer suction was utilized to help facilitate drainage of this abscess  Phone Contacts  ED Phone Contact    ED Course                               MDM  Number of Diagnoses or Management Options  Abscess of lower back:   Diagnosis management comments: Medical Decision Making:  Most likely diagnosis is fluctuant abscess  Primary considerations diagnosis include the presence of overlying erythema, as well as fluctuance 3 notable fatty area of the body,, associated with abscess  Addition, the patient has no constitutional symptoms, including fever, malaise, or generalized weakness, associated with more systemic signs of infection  There is, however, indications of overlying cellulitis, as such, the patient will be provided with a course of prophylactic antibiotics, to cover both MRSA and streptococcal infections   In addition, the area was drained all the emergency department, liver eating a large amount of purulent drainage  Area was left open, with packing in place, with instructions for the patient to remove the associated packing in 2 days, and follow up with her primary care provider no more than 2 days after that  Patient was instructed to return to the emergency department should the developed headache, syncope, near-syncope, chest pain, shortness of breath, abdominal pain, back pain, or the character nature of the drainage greatly changes  In addition, the patient was instructed to follow up with her primary care provider should develop symptoms of further site infection, including lymphangitic streaking, pain, increased drainage, and increased surrounding erythema  The patient was in agreement with this plan, as was presented  At the time of discharge, patient is hemodynamically stable, no noted distress  Additionally, during the medical evaluation and determination of utilization of antibiotics after this, it was discovered, and noted that the patient has had multiple abscesses in the past, all of which have been pan sensitive to cephalexin  As such, the patient will be provided coverage with cephalexin, and will be called if the results of her wound culture requires alteration or the addition of other antibiotics  The patient is in agreement with this         Amount and/or Complexity of Data Reviewed  Clinical lab tests: ordered and reviewed  Tests in the radiology section of CPT®: reviewed  Tests in the medicine section of CPT®: reviewed and ordered  Decide to obtain previous medical records or to obtain history from someone other than the patient: yes  Obtain history from someone other than the patient: yes  Review and summarize past medical records: yes  Discuss the patient with other providers: yes  Independent visualization of images, tracings, or specimens: yes    Risk of Complications, Morbidity, and/or Mortality  Presenting problems: moderate  Diagnostic procedures: moderate  Management options: high    Patient Progress  Patient progress: stable    CritCare Time    Disposition  Final diagnoses:   Abscess of lower back     Time reflects when diagnosis was documented in both MDM as applicable and the Disposition within this note     Time User Action Codes Description Comment    11/17/2018  1:26 AM Yumiko Ventura Add [L02 [id number]] Abscess of lower back       ED Disposition     ED Disposition Condition Comment    Discharge  [address] discharge to home/self care      Condition at discharge: Good        Follow-up Information     Follow up With Specialties Details Why [address] Tevin Burciaga, DO Internal Medicine In 3 days If symptoms worsen, As needed [address]  [address] [facility], [facility] [address] [facility]  [phone]            Discharge Medication List as of 11/17/2018  1:28 AM      START taking these medications    Details   cephalexin (KEFLEX) 500 mg capsule Take 1 capsule (500 mg total) by mouth every 8 (eight) hours for 10 days, Starting Sat 11/17/2018, Until Tue 11/27/2018, Print         CONTINUE these medications which have NOT CHANGED    Details   Etonogestrel (NEXPLANON) 68 MG IMPL Inject under the skin, Historical Med      albuterol (2 5 mg/3 mL) 0 083 % nebulizer solution INHALE 1 VIAL VIA NEBULIZER EVERY 4 HOURS AS NEEDED, Historical Med      albuterol (PROVENTIL HFA,VENTOLIN HFA) 90 mcg/act inhaler Inhale 2 puffs every 6 (six) hours as needed for wheezing, Starting Fri 5/11/2018, Normal      fexofenadine (ALLEGRA) 30 MG tablet Take 30 mg by mouth 2 (two) times a day , Until Discontinued, Historical Med      ondansetron (ZOFRAN) 4 mg tablet Take 1 tablet by mouth every 8 (eight) hours as needed for nausea or vomiting, Starting Tue 1/23/2018, Print      azithromycin (ZITHROMAX) 250 mg tablet TAKE 2 TABLETS BY MOUTH TODAY, THEN TAKE 1 TABLET DAILY FOR 4 DAYS, Historical Med      dicyclomine (BENTYL) 10 mg capsule Take 1 capsule by mouth 3 (three) times a day before meals for 3 days, Starting Tue 1/23/2018, Until Fri 1/26/2018, Print      predniSONE 20 mg tablet TAKE 3 TABLETS BY MOUTH EVERY DAY WITH FOOD, Historical Med           No discharge procedures on file      ED Provider  Electronically Signed by           Tricia Haro PA-C  11/17/18 [address] [facility]  11/17/18 6971

## 2018-11-19 ENCOUNTER — APPOINTMENT (OUTPATIENT)
Dept: LAB | Facility: CLINIC | Age: 33
End: 2018-11-19
Payer: COMMERCIAL

## 2018-11-19 ENCOUNTER — OFFICE VISIT (OUTPATIENT)
Dept: INTERNAL MEDICINE CLINIC | Facility: CLINIC | Age: 33
End: 2018-11-19
Payer: COMMERCIAL

## 2018-11-19 ENCOUNTER — TRANSCRIBE ORDERS (OUTPATIENT)
Dept: LAB | Facility: CLINIC | Age: 33
End: 2018-11-19

## 2018-11-19 ENCOUNTER — APPOINTMENT (OUTPATIENT)
Dept: RADIOLOGY | Facility: CLINIC | Age: 33
End: 2018-11-19
Payer: COMMERCIAL

## 2018-11-19 VITALS
TEMPERATURE: 97.8 F | SYSTOLIC BLOOD PRESSURE: 108 MMHG | HEART RATE: 59 BPM | HEIGHT: 64 IN | DIASTOLIC BLOOD PRESSURE: 70 MMHG | RESPIRATION RATE: 16 BRPM | OXYGEN SATURATION: 98 % | BODY MASS INDEX: 50.02 KG/M2 | WEIGHT: 293 LBS

## 2018-11-19 DIAGNOSIS — L02.212 ABSCESS OF BACK: Primary | ICD-10-CM

## 2018-11-19 DIAGNOSIS — E66.01 MORBID OBESITY (HCC): ICD-10-CM

## 2018-11-19 DIAGNOSIS — J45.20 INTERMITTENT ASTHMA WITHOUT COMPLICATION, UNSPECIFIED ASTHMA SEVERITY: ICD-10-CM

## 2018-11-19 DIAGNOSIS — Z23 NEED FOR PNEUMOCOCCAL VACCINATION: ICD-10-CM

## 2018-11-19 DIAGNOSIS — Z23 NEED FOR INFLUENZA VACCINATION: ICD-10-CM

## 2018-11-19 DIAGNOSIS — Z23 NEED FOR 23-POLYVALENT PNEUMOCOCCAL POLYSACCHARIDE VACCINE: ICD-10-CM

## 2018-11-19 DIAGNOSIS — L65.9 HAIR THINNING: ICD-10-CM

## 2018-11-19 DIAGNOSIS — J18.9 COMMUNITY ACQUIRED PNEUMONIA OF RIGHT LOWER LOBE OF LUNG: ICD-10-CM

## 2018-11-19 LAB
ALBUMIN SERPL BCP-MCNC: 3.1 G/DL (ref 3.5–5)
ALP SERPL-CCNC: 43 U/L (ref 46–116)
ALT SERPL W P-5'-P-CCNC: 23 U/L (ref 12–78)
ANION GAP SERPL CALCULATED.3IONS-SCNC: 8 MMOL/L (ref 4–13)
AST SERPL W P-5'-P-CCNC: 13 U/L (ref 5–45)
BILIRUB SERPL-MCNC: 0.4 MG/DL (ref 0.2–1)
BUN SERPL-MCNC: 12 MG/DL (ref 5–25)
CALCIUM SERPL-MCNC: 9 MG/DL (ref 8.3–10.1)
CHLORIDE SERPL-SCNC: 106 MMOL/L (ref 100–108)
CHOLEST SERPL-MCNC: 101 MG/DL (ref 50–200)
CO2 SERPL-SCNC: 29 MMOL/L (ref 21–32)
CREAT SERPL-MCNC: 0.58 MG/DL (ref 0.6–1.3)
ERYTHROCYTE [DISTWIDTH] IN BLOOD BY AUTOMATED COUNT: 12.3 % (ref 11.6–15.1)
GFR SERPL CREATININE-BSD FRML MDRD: 121 ML/MIN/1.73SQ M
GLUCOSE P FAST SERPL-MCNC: 89 MG/DL (ref 65–99)
HCT VFR BLD AUTO: 37.2 % (ref 34.8–46.1)
HDLC SERPL-MCNC: 33 MG/DL (ref 40–60)
HGB BLD-MCNC: 12.1 G/DL (ref 11.5–15.4)
LDLC SERPL CALC-MCNC: 60 MG/DL (ref 0–100)
MCH RBC QN AUTO: 32.9 PG (ref 26.8–34.3)
MCHC RBC AUTO-ENTMCNC: 32.5 G/DL (ref 31.4–37.4)
MCV RBC AUTO: 101 FL (ref 82–98)
NONHDLC SERPL-MCNC: 68 MG/DL
PLATELET # BLD AUTO: 188 THOUSANDS/UL (ref 149–390)
PMV BLD AUTO: 10.6 FL (ref 8.9–12.7)
POTASSIUM SERPL-SCNC: 3.6 MMOL/L (ref 3.5–5.3)
PROT SERPL-MCNC: 7.4 G/DL (ref 6.4–8.2)
RBC # BLD AUTO: 3.68 MILLION/UL (ref 3.81–5.12)
SODIUM SERPL-SCNC: 143 MMOL/L (ref 136–145)
TRIGL SERPL-MCNC: 42 MG/DL
TSH SERPL DL<=0.05 MIU/L-ACNC: 1.22 UIU/ML (ref 0.36–3.74)
WBC # BLD AUTO: 4.3 THOUSAND/UL (ref 4.31–10.16)

## 2018-11-19 PROCEDURE — 83036 HEMOGLOBIN GLYCOSYLATED A1C: CPT

## 2018-11-19 PROCEDURE — 80053 COMPREHEN METABOLIC PANEL: CPT

## 2018-11-19 PROCEDURE — 80061 LIPID PANEL: CPT

## 2018-11-19 PROCEDURE — 84443 ASSAY THYROID STIM HORMONE: CPT

## 2018-11-19 PROCEDURE — 90732 PPSV23 VACC 2 YRS+ SUBQ/IM: CPT

## 2018-11-19 PROCEDURE — 3008F BODY MASS INDEX DOCD: CPT | Performed by: INTERNAL MEDICINE

## 2018-11-19 PROCEDURE — 90472 IMMUNIZATION ADMIN EACH ADD: CPT

## 2018-11-19 PROCEDURE — 1036F TOBACCO NON-USER: CPT | Performed by: INTERNAL MEDICINE

## 2018-11-19 PROCEDURE — 71046 X-RAY EXAM CHEST 2 VIEWS: CPT

## 2018-11-19 PROCEDURE — 90686 IIV4 VACC NO PRSV 0.5 ML IM: CPT

## 2018-11-19 PROCEDURE — 90471 IMMUNIZATION ADMIN: CPT

## 2018-11-19 PROCEDURE — 85027 COMPLETE CBC AUTOMATED: CPT

## 2018-11-19 PROCEDURE — 99214 OFFICE O/P EST MOD 30 MIN: CPT | Performed by: INTERNAL MEDICINE

## 2018-11-19 PROCEDURE — 36415 COLL VENOUS BLD VENIPUNCTURE: CPT

## 2018-11-19 RX ORDER — BIOTIN 2500 MCG
CAPSULE ORAL
COMMUNITY
End: 2019-03-18

## 2018-11-19 NOTE — ASSESSMENT & PLAN NOTE
She has lost>125pounds over the past 6months through strict dieting and physical activity  Will check labs and advised to decrease rate of weight loss to prevent gallstones and also concern for recurrence of eating disorder  She has fear of gaining weight at this time  If symptoms worsen, will send for therapy

## 2018-11-19 NOTE — PROGRESS NOTES
Assessment/Plan:    Asthma  Improved with weight loss  Use proventil prn  Due for influenza vaccine and pneumovax 23 today  Morbid obesity ([address] )  She has lost>125pounds over the past 6months through strict dieting and physical activity  Will check labs and advised to decrease rate of weight loss to prevent gallstones and also concern for recurrence of eating disorder  She has fear of gaining weight at this time  If symptoms worsen, will send for therapy  Abscess of back  S/p I&D  Patient to complete course of keflex, awaiting final results of culture  Recheck in one week    Hair thinning  Check tsh  Suspect due to rapid weight loss  1  Morbid obesity (HCC)  CBC    Hemoglobin A1C    Lipid panel    Comprehensive metabolic panel   2  Intermittent asthma without complication, unspecified asthma severity  CBC   3  Abscess of back     4  Hair thinning  CBC    Comprehensive metabolic panel    TSH, 3rd generation with Free T4 reflex   5  Need for pneumococcal vaccination  PNEUMOCOCCAL POLYSACCHARIDE VACCINE 23-VALENT =>3YO SQ IM   6  Need for influenza vaccination  SECOND LINE (SYRINGE, SINGLE-DOSE VIAL): influenza vaccine, 0316-8529, quadrivalent, 0 5 mL, preservative-free (AFLURIA, FLUARIX, FLULAVAL, FLUZONE)    CANCELED: FIRST LINE: influenza vaccine, [id number], quadrivalent, recombinant, PF, 0 5 mL (FLUBLOK)   7  Need for 23-polyvalent pneumococcal polysaccharide vaccine         Subjective:      Patient ID: Mohsen Petersen is a 35 y o  female  HPI  30yo female with morbid obesity, asthma, MDD here for ER follow up  She reports her heaviest weight was 436 8 pounds  On June 1st she started her diet  She exercises 5-6 days per week, is avoiding carbs but does have "cheat" days and portion control  Only drinks water, has eliminated sodas or juice  She denies abd pains but has thinning hair  Asthma is much improved    She reports she used her rescue inhaler when she first started working out but since her weight loss, has not had flares  She was evaluated in ED for R posterior back abscess that was drained she was placed on cephalexin 500mg q8  Reports soreness in the area and it continues to drain and accidentally pulled the packing yesterday  She reports fevers resolved after drainage  Reports she was on Bactrim from [facility] with another abscess from L groin in October  The following portions of the patient's history were reviewed and updated as appropriate: allergies, current medications, past family history, past medical history, past social history, past surgical history and problem list     Current Outpatient Prescriptions:     albuterol (2 5 mg/3 mL) 0 083 % nebulizer solution, INHALE 1 VIAL VIA NEBULIZER EVERY 4 HOURS AS NEEDED, Disp: , Rfl: 1    albuterol (PROVENTIL HFA,VENTOLIN HFA) 90 mcg/act inhaler, Inhale 2 puffs every 6 (six) hours as needed for wheezing, Disp: 18 g, Rfl: 2    Biotin 2500 MCG CAPS, Take by mouth, Disp: , Rfl:     cephalexin (KEFLEX) 500 mg capsule, Take 1 capsule (500 mg total) by mouth every 8 (eight) hours for 10 days, Disp: 30 capsule, Rfl: 0    Etonogestrel (NEXPLANON) 68 MG IMPL, Inject under the skin, Disp: , Rfl:     fexofenadine (ALLEGRA) 30 MG tablet, Take 30 mg by mouth 2 (two) times a day , Disp: , Rfl:     ondansetron (ZOFRAN) 4 mg tablet, Take 1 tablet by mouth every 8 (eight) hours as needed for nausea or vomiting, Disp: 20 tablet, Rfl: 0    Review of Systems   Constitutional: Positive for activity change, appetite change and unexpected weight change  Respiratory: Negative  Cardiovascular: Negative  Gastrointestinal: Negative  Endocrine:        Hair thinning   Skin: Positive for wound  Neurological: Positive for light-headedness  Negative for headaches           Objective:    /70 (BP Location: Left arm, Patient Position: Sitting)   Pulse 59   Temp 97 8 °F (36 6 °C)   Resp 16   Ht 5' 4" (1 626 m)   Wt 136 kg (299 lb 12 8 oz)   LMP 11/16/2018   SpO2 98%   BMI 51 46 kg/m²      Physical Exam   Constitutional: She appears well-developed and well-nourished  No distress  Morbidly obese   HENT:   Mouth/Throat: Oropharynx is clear and moist    Neck: Neck supple  Cardiovascular: Normal rate, regular rhythm, normal heart sounds and intact distal pulses  Pulmonary/Chest: Effort normal and breath sounds normal  No respiratory distress  She has no wheezes  Neurological: She is alert  Skin:   R posterior back elliptical incision with serous drainage, fluctuant, no significant overlying erythema noted  Psychiatric: She has a normal mood and affect  Vitals reviewed

## 2018-11-19 NOTE — ASSESSMENT & PLAN NOTE
S/p I&D  Patient to complete course of keflex, awaiting final results of culture    Recheck in one week

## 2018-11-20 ENCOUNTER — TELEPHONE (OUTPATIENT)
Dept: INTERNAL MEDICINE CLINIC | Facility: CLINIC | Age: 33
End: 2018-11-20

## 2018-11-20 LAB
EST. AVERAGE GLUCOSE BLD GHB EST-MCNC: 71 MG/DL
HBA1C MFR BLD: 4.1 % (ref 4.2–6.3)

## 2018-11-20 NOTE — TELEPHONE ENCOUNTER
Patient was given the flu and pneumonia shot yesterday and today she is feeling terrible, she has a sore throat, body aches, joints are very sore and achy  She is scheduled to work a 16 hr shift tomorrow but is afraid she will not be able to and would like to know if you would give her a note to not work tomorrow and then she is not scheduled to go back to work until Sunday and she feels she will be ok for then    As of right now , she is still in bed and just does not have the energy to move she is so achy

## 2018-11-21 LAB
BACTERIA WND AEROBE CULT: ABNORMAL
BACTERIA WND AEROBE CULT: ABNORMAL
GRAM STN SPEC: ABNORMAL
GRAM STN SPEC: ABNORMAL

## 2018-11-30 ENCOUNTER — OFFICE VISIT (OUTPATIENT)
Dept: INTERNAL MEDICINE CLINIC | Facility: CLINIC | Age: 33
End: 2018-11-30
Payer: COMMERCIAL

## 2018-11-30 ENCOUNTER — APPOINTMENT (OUTPATIENT)
Dept: LAB | Facility: CLINIC | Age: 33
End: 2018-11-30
Payer: COMMERCIAL

## 2018-11-30 VITALS
RESPIRATION RATE: 14 BRPM | OXYGEN SATURATION: 92 % | WEIGHT: 293 LBS | HEIGHT: 64 IN | DIASTOLIC BLOOD PRESSURE: 74 MMHG | SYSTOLIC BLOOD PRESSURE: 114 MMHG | HEART RATE: 76 BPM | BODY MASS INDEX: 50.02 KG/M2 | TEMPERATURE: 98 F

## 2018-11-30 DIAGNOSIS — D75.89 MACROCYTOSIS WITHOUT ANEMIA: ICD-10-CM

## 2018-11-30 DIAGNOSIS — D72.819 LEUKOPENIA, UNSPECIFIED TYPE: ICD-10-CM

## 2018-11-30 DIAGNOSIS — E66.01 MORBID OBESITY (HCC): ICD-10-CM

## 2018-11-30 DIAGNOSIS — E55.9 VITAMIN D DEFICIENCY: ICD-10-CM

## 2018-11-30 DIAGNOSIS — L02.212 ABSCESS OF BACK: Primary | ICD-10-CM

## 2018-11-30 PROBLEM — J18.9 COMMUNITY ACQUIRED PNEUMONIA OF RIGHT LUNG: Status: RESOLVED | Noted: 2018-05-11 | Resolved: 2018-11-30

## 2018-11-30 PROBLEM — L65.9 HAIR THINNING: Status: RESOLVED | Noted: 2018-11-19 | Resolved: 2018-11-30

## 2018-11-30 LAB
25(OH)D3 SERPL-MCNC: 18 NG/ML (ref 30–100)
BASOPHILS # BLD AUTO: 0.02 THOUSANDS/ΜL (ref 0–0.1)
BASOPHILS NFR BLD AUTO: 0 % (ref 0–1)
EOSINOPHIL # BLD AUTO: 0.13 THOUSAND/ΜL (ref 0–0.61)
EOSINOPHIL NFR BLD AUTO: 3 % (ref 0–6)
ERYTHROCYTE [DISTWIDTH] IN BLOOD BY AUTOMATED COUNT: 12.5 % (ref 11.6–15.1)
FOLATE SERPL-MCNC: 17.5 NG/ML (ref 3.1–17.5)
HCT VFR BLD AUTO: 35.2 % (ref 34.8–46.1)
HGB BLD-MCNC: 11.1 G/DL (ref 11.5–15.4)
IMM GRANULOCYTES # BLD AUTO: 0.01 THOUSAND/UL (ref 0–0.2)
IMM GRANULOCYTES NFR BLD AUTO: 0 % (ref 0–2)
LYMPHOCYTES # BLD AUTO: 1.77 THOUSANDS/ΜL (ref 0.6–4.47)
LYMPHOCYTES NFR BLD AUTO: 37 % (ref 14–44)
MCH RBC QN AUTO: 32.5 PG (ref 26.8–34.3)
MCHC RBC AUTO-ENTMCNC: 31.5 G/DL (ref 31.4–37.4)
MCV RBC AUTO: 103 FL (ref 82–98)
MONOCYTES # BLD AUTO: 0.44 THOUSAND/ΜL (ref 0.17–1.22)
MONOCYTES NFR BLD AUTO: 9 % (ref 4–12)
NEUTROPHILS # BLD AUTO: 2.43 THOUSANDS/ΜL (ref 1.85–7.62)
NEUTS SEG NFR BLD AUTO: 51 % (ref 43–75)
NRBC BLD AUTO-RTO: 0 /100 WBCS
PLATELET # BLD AUTO: 233 THOUSANDS/UL (ref 149–390)
PMV BLD AUTO: 11.3 FL (ref 8.9–12.7)
RBC # BLD AUTO: 3.42 MILLION/UL (ref 3.81–5.12)
VIT B12 SERPL-MCNC: 752 PG/ML (ref 100–900)
WBC # BLD AUTO: 4.8 THOUSAND/UL (ref 4.31–10.16)

## 2018-11-30 PROCEDURE — 85025 COMPLETE CBC W/AUTO DIFF WBC: CPT

## 2018-11-30 PROCEDURE — 82746 ASSAY OF FOLIC ACID SERUM: CPT

## 2018-11-30 PROCEDURE — 82306 VITAMIN D 25 HYDROXY: CPT

## 2018-11-30 PROCEDURE — 36415 COLL VENOUS BLD VENIPUNCTURE: CPT

## 2018-11-30 PROCEDURE — 3008F BODY MASS INDEX DOCD: CPT | Performed by: INTERNAL MEDICINE

## 2018-11-30 PROCEDURE — 99213 OFFICE O/P EST LOW 20 MIN: CPT | Performed by: INTERNAL MEDICINE

## 2018-11-30 PROCEDURE — 82607 VITAMIN B-12: CPT

## 2018-11-30 NOTE — ASSESSMENT & PLAN NOTE
She is actively losing weight through lifestyle modifications  BMI is 50 with comorbidity of asthma

## 2018-11-30 NOTE — ASSESSMENT & PLAN NOTE
Check vitamin B12 and folate  Has had significant intentional weight loss    Will re-eval at next visit

## 2018-11-30 NOTE — PROGRESS NOTES
Assessment/Plan: Morbid obesity ([facility] [address] )  She is actively losing weight through lifestyle modifications  BMI is 50 with comorbidity of asthma  Vitamin D deficiency  Reports she was previously treated for vitamin D deficiency  Will check levels  WBC decreased  New, mild  Check CBCD    Macrocytosis without anemia  Check vitamin B12 and folate  Has had significant intentional weight loss  Will re-eval at next visit    Abscess of back  Resolved  Completed course of keflex  Culture showed actinomyces  Has had multiple episodes, may need to establish with surgeon due to need for recurrent I&D       1  Abscess of back     2  Morbid obesity ([facility] [address] )     3  Vitamin D deficiency  Vitamin D 25 hydroxy   4  Macrocytosis without anemia  Vitamin B12    Folate   5  Leukopenia, unspecified type  CBC and differential    Vitamin B12    Folate       Subjective:      Patient ID: Robe Randall is a 35 y o  female  HPI  35-year-old female with morbid obesity, MDD and asthma here for follow-up  After thanksgiving, she gained 6 pounds but has lost since and is negative five pounds since her last visit  She detoxes by decreasing her carb intake and juices her vegetables and fruits  She completed course of keflex, denies drainage of abscess and pain      The following portions of the patient's history were reviewed and updated as appropriate: allergies, current medications, past family history, past medical history, past social history, past surgical history and problem list     Current Outpatient Prescriptions:     albuterol (2 5 mg/3 mL) 0 083 % nebulizer solution, INHALE 1 VIAL VIA NEBULIZER EVERY 4 HOURS AS NEEDED, Disp: , Rfl: 1    albuterol (PROVENTIL HFA,VENTOLIN HFA) 90 mcg/act inhaler, Inhale 2 puffs every 6 (six) hours as needed for wheezing, Disp: 18 g, Rfl: 2    Biotin 2500 MCG CAPS, Take by mouth, Disp: , Rfl:     Etonogestrel (NEXPLANON) 68 MG IMPL, Inject under the skin, Disp: , Rfl:     fexofenadine (ALLEGRA) 30 MG tablet, Take 30 mg by mouth 2 (two) times a day , Disp: , Rfl:     ondansetron (ZOFRAN) 4 mg tablet, Take 1 tablet by mouth every 8 (eight) hours as needed for nausea or vomiting, Disp: 20 tablet, Rfl: 0    Review of Systems   Constitutional: Positive for activity change, appetite change and unexpected weight change  Respiratory: Negative  Cardiovascular: Negative  Gastrointestinal: Negative  Skin: Positive for wound  Neurological: Negative  Objective:    /74   Pulse 76   Temp 98 °F (36 7 °C)   Resp 14   Ht 5' 4" (1 626 m)   Wt 133 kg (294 lb)   LMP 11/16/2018   SpO2 92%   BMI 50 46 kg/m²      Physical Exam   Constitutional: She appears well-developed and well-nourished  No distress  Morbidly obese   Neurological: She is alert  Skin:   R posterior back healed elliptical incision without overlying erythema or tenderness noted  Psychiatric: She has a normal mood and affect  Vitals reviewed        Recent Results (from the past 336 hour(s))   Wound culture and Gram stain    Collection Time: 11/17/18  1:24 AM   Result Value Ref Range    Wound Culture 1+ Growth of      Wound Culture Growth in Broth culture only Actinomyces species (A)     Gram Stain Result 4+ Polys     Gram Stain Result 4+ Gram positive cocci in pairs        Susceptibility    Actinomyces species - REJI     ZID Performed Yes     CBC    Collection Time: 11/19/18  1:16 PM   Result Value Ref Range    WBC 4 30 (L) 4 31 - 10 16 Thousand/uL    RBC 3 68 (L) 3 81 - 5 12 Million/uL    Hemoglobin 12 1 11 5 - 15 4 g/dL    Hematocrit 37 2 34 8 - 46 1 %     (H) 82 - 98 fL    MCH 32 9 26 8 - 34 3 pg    MCHC 32 5 31 4 - 37 4 g/dL    RDW 12 3 11 6 - 15 1 %    Platelets 330 779 - 456 Thousands/uL    MPV 10 6 8 9 - 12 7 fL   Hemoglobin A1C    Collection Time: 11/19/18  1:16 PM   Result Value Ref Range    Hemoglobin A1C 4 1 (L) 4 2 - 6 3 %    EAG 71 mg/dl   Lipid panel    Collection Time: 11/19/18  1:16 PM Result Value Ref Range    Cholesterol 101 50 - 200 mg/dL    Triglycerides 42 <=150 mg/dL    HDL, Direct 33 (L) 40 - 60 mg/dL    LDL Calculated 60 0 - 100 mg/dL    Non-HDL-Chol (CHOL-HDL) 68 mg/dl   Comprehensive metabolic panel    Collection Time: 11/19/18  1:16 PM   Result Value Ref Range    Sodium 143 136 - 145 mmol/L    Potassium 3 6 3 5 - 5 3 mmol/L    Chloride 106 100 - 108 mmol/L    CO2 29 21 - 32 mmol/L    ANION GAP 8 4 - 13 mmol/L    BUN 12 5 - 25 mg/dL    Creatinine 0 58 (L) 0 60 - 1 30 mg/dL    Glucose, Fasting 89 65 - 99 mg/dL    Calcium 9 0 8 3 - 10 1 mg/dL    AST 13 5 - 45 U/L    ALT 23 12 - 78 U/L    Alkaline Phosphatase 43 (L) 46 - 116 U/L    Total Protein 7 4 6 4 - 8 2 g/dL    Albumin 3 1 (L) 3 5 - 5 0 g/dL    Total Bilirubin 0 40 0 20 - 1 00 mg/dL    eGFR 121 ml/min/1 73sq m   TSH, 3rd generation with Free T4 reflex    Collection Time: 11/19/18  1:16 PM   Result Value Ref Range    TSH 3RD GENERATON 1 224 0 358 - 3 740 uIU/mL

## 2018-11-30 NOTE — ASSESSMENT & PLAN NOTE
Resolved  Completed course of keflex  Culture showed actinomyces    Has had multiple episodes, may need to establish with surgeon due to need for recurrent I&D

## 2018-12-03 ENCOUNTER — TELEPHONE (OUTPATIENT)
Dept: INTERNAL MEDICINE CLINIC | Facility: CLINIC | Age: 33
End: 2018-12-03

## 2018-12-03 DIAGNOSIS — D53.9 MACROCYTIC ANEMIA: ICD-10-CM

## 2018-12-03 DIAGNOSIS — E55.9 VITAMIN D DEFICIENCY: Primary | ICD-10-CM

## 2018-12-03 RX ORDER — ERGOCALCIFEROL 1.25 MG/1
50000 CAPSULE ORAL
Qty: 8 CAPSULE | Refills: 1 | Status: SHIPPED | OUTPATIENT
Start: 2018-12-03 | End: 2019-03-18

## 2018-12-03 NOTE — TELEPHONE ENCOUNTER
Discussed results with patient  Has vitamin D def with level 18, will start on vitamin D2 12628kgdor twice weekly x 8 weeks then start maintenance vitamin D3 at least 2000units daily  Leukopenia has resolved but CBC now shows macrocytic anemia, vitamin B12 and folate are currently normal   Plan to repeat CBCD in one month, suspect levels will change  She has changed her diet significantly

## 2018-12-13 ENCOUNTER — HOSPITAL ENCOUNTER (EMERGENCY)
Facility: HOSPITAL | Age: 33
Discharge: HOME | End: 2018-12-13
Attending: EMERGENCY MEDICINE | Admitting: EMERGENCY MEDICINE
Payer: COMMERCIAL

## 2018-12-13 VITALS
SYSTOLIC BLOOD PRESSURE: 132 MMHG | DIASTOLIC BLOOD PRESSURE: 66 MMHG | OXYGEN SATURATION: 99 % | RESPIRATION RATE: 18 BRPM | HEART RATE: 56 BPM | TEMPERATURE: 97.8 F

## 2018-12-13 DIAGNOSIS — Z20.2 POSSIBLE EXPOSURE TO STD: ICD-10-CM

## 2018-12-13 DIAGNOSIS — E86.0 MILD DEHYDRATION: Primary | ICD-10-CM

## 2018-12-13 DIAGNOSIS — E87.6 HYPOKALEMIA: ICD-10-CM

## 2018-12-13 LAB
ANION GAP SERPL CALCULATED.3IONS-SCNC: 8 MMOL/L (ref 4–13)
BACTERIA UR QL AUTO: ABNORMAL /HPF
BILIRUB UR QL STRIP: ABNORMAL
BUN SERPL-MCNC: 11 MG/DL (ref 5–25)
C TRACH DNA SPEC QL NAA+PROBE: NEGATIVE
CALCIUM SERPL-MCNC: 8.6 MG/DL (ref 8.3–10.1)
CHLORIDE SERPL-SCNC: 102 MMOL/L (ref 100–108)
CLARITY UR: CLEAR
CO2 SERPL-SCNC: 30 MMOL/L (ref 21–32)
COLOR UR: ABNORMAL
CREAT SERPL-MCNC: 0.6 MG/DL (ref 0.6–1.3)
EXT PREG TEST URINE: NEGATIVE
GFR SERPL CREATININE-BSD FRML MDRD: 120 ML/MIN/1.73SQ M
GLUCOSE SERPL-MCNC: 74 MG/DL (ref 65–140)
GLUCOSE UR STRIP-MCNC: NEGATIVE MG/DL
HGB UR QL STRIP.AUTO: NEGATIVE
KETONES UR STRIP-MCNC: ABNORMAL MG/DL
LEUKOCYTE ESTERASE UR QL STRIP: ABNORMAL
MUCOUS THREADS UR QL AUTO: ABNORMAL
N GONORRHOEA DNA SPEC QL NAA+PROBE: NEGATIVE
NITRITE UR QL STRIP: NEGATIVE
NON-SQ EPI CELLS URNS QL MICRO: ABNORMAL /HPF
PH UR STRIP.AUTO: 5.5 [PH] (ref 4.5–8)
POTASSIUM SERPL-SCNC: 3.3 MMOL/L (ref 3.5–5.3)
PROT UR STRIP-MCNC: ABNORMAL MG/DL
RBC #/AREA URNS AUTO: ABNORMAL /HPF
SODIUM SERPL-SCNC: 140 MMOL/L (ref 136–145)
SP GR UR STRIP.AUTO: >=1.03 (ref 1–1.03)
UROBILINOGEN UR QL STRIP.AUTO: 1 E.U./DL
WBC #/AREA URNS AUTO: ABNORMAL /HPF

## 2018-12-13 PROCEDURE — 87389 HIV-1 AG W/HIV-1&-2 AB AG IA: CPT | Performed by: EMERGENCY MEDICINE

## 2018-12-13 PROCEDURE — 87491 CHLMYD TRACH DNA AMP PROBE: CPT | Performed by: EMERGENCY MEDICINE

## 2018-12-13 PROCEDURE — 80074 ACUTE HEPATITIS PANEL: CPT | Performed by: EMERGENCY MEDICINE

## 2018-12-13 PROCEDURE — 81001 URINALYSIS AUTO W/SCOPE: CPT

## 2018-12-13 PROCEDURE — 80048 BASIC METABOLIC PNL TOTAL CA: CPT | Performed by: EMERGENCY MEDICINE

## 2018-12-13 PROCEDURE — 86592 SYPHILIS TEST NON-TREP QUAL: CPT | Performed by: EMERGENCY MEDICINE

## 2018-12-13 PROCEDURE — 99283 EMERGENCY DEPT VISIT LOW MDM: CPT

## 2018-12-13 PROCEDURE — 87591 N.GONORRHOEAE DNA AMP PROB: CPT | Performed by: EMERGENCY MEDICINE

## 2018-12-13 PROCEDURE — 81025 URINE PREGNANCY TEST: CPT | Performed by: EMERGENCY MEDICINE

## 2018-12-13 PROCEDURE — 36415 COLL VENOUS BLD VENIPUNCTURE: CPT | Performed by: EMERGENCY MEDICINE

## 2018-12-13 NOTE — ED PROVIDER NOTES
History  Chief Complaint   Patient presents with    Possible UTI     pt presents ambulatory with c/o suprapubic cramping, urinary urgency, foul odor  denies discharge/bleeding/dysuria  requesting STD check       History provided by:  Patient   used: No     60-year-old female presented for evaluation of possible it sexually transmitted disease  She notes that she found out her fiance had been cheating on her about 2 weeks ago  They had been having unprotected intercourse  Denies any history of STD that she started developing some pelvic cramping and also a generalized foul odor  Notes some urinary urgency but no dysuria  No discharge or rash  No fever, chills, nausea, vomiting  Her last menstrual period was 2 weeks ago  Minimal suprapubic tenderness on exam   No CVA tenderness  Differential diagnosis includes UTI, ovarian cyst, STD  Will check urine, labs and re-evaluate  Discussed not all testing will result today and will need to follow cultures  Prior to Admission Medications   Prescriptions Last Dose Informant Patient Reported? Taking? Biotin 2500 MCG CAPS   Yes No   Sig: Take by mouth   Etonogestrel (NEXPLANON) 68 MG IMPL   Yes No   Sig: Inject under the skin   albuterol (2 5 mg/3 mL) 0 083 % nebulizer solution   Yes No   Sig: INHALE 1 VIAL VIA NEBULIZER EVERY 4 HOURS AS NEEDED   albuterol (PROVENTIL HFA,VENTOLIN HFA) 90 mcg/act inhaler   No No   Sig: Inhale 2 puffs every 6 (six) hours as needed for wheezing   ergocalciferol (VITAMIN D2) 50,000 units   No No   Sig: Take 1 capsule (50,000 Units total) by mouth 2 (two) times a week with meals   fexofenadine (ALLEGRA) 30 MG tablet   Yes No   Sig: Take 30 mg by mouth 2 (two) times a day     ondansetron (ZOFRAN) 4 mg tablet   No No   Sig: Take 1 tablet by mouth every 8 (eight) hours as needed for nausea or vomiting      Facility-Administered Medications: None       Past Medical History:   Diagnosis Date    Asthma     Chronic pain     Depression     Insomnia     Major depressive disorder, recurrent episode, severe (HCC)     Seasonal allergies        Past Surgical History:   Procedure Laterality Date    ABCESS DRAINAGE      Incision and dranage of skin abscess    NO PAST SURGERIES      UPPER GASTROINTESTINAL ENDOSCOPY         Family History   Problem Relation Age of Onset    Other Father         Cardiac disorder    Hypertension Father     Diabetes Maternal Grandmother      I have reviewed and agree with the history as documented  Social History   Substance Use Topics    Smoking status: Never Smoker    Smokeless tobacco: Never Used    Alcohol use No        Review of Systems   Constitutional: Negative for activity change, appetite change and fever  Gastrointestinal: Negative for nausea and vomiting  Genitourinary: Positive for pelvic pain and urgency  Negative for difficulty urinating, dysuria, vaginal bleeding, vaginal discharge and vaginal pain  Musculoskeletal: Negative for back pain and neck pain  Skin: Negative for color change and rash  All other systems reviewed and are negative  Physical Exam  Physical Exam   Constitutional: She is oriented to person, place, and time  She appears well-developed and well-nourished  No distress  HENT:   Head: Normocephalic  Mouth/Throat: Oropharynx is clear and moist    Cardiovascular: Normal rate and regular rhythm  Pulmonary/Chest: Effort normal  No respiratory distress  Abdominal: Soft  She exhibits no distension  Minimal suprapubic tenderness  No CVA tenderness  Musculoskeletal: Normal range of motion  She exhibits no edema  Neurological: She is alert and oriented to person, place, and time  Skin: Skin is warm and dry  No rash noted  Psychiatric: She has a normal mood and affect  Her behavior is normal    Nursing note and vitals reviewed        Vital Signs  ED Triage Vitals [12/13/18 1358]   Temperature Pulse Respirations Blood Pressure SpO2   97 8 °F (36 6 °C) 56 18 132/66 99 %      Temp Source Heart Rate Source Patient Position - Orthostatic VS BP Location FiO2 (%)   Oral Monitor -- -- --      Pain Score       --           Vitals:    12/13/18 1358   BP: 132/66   Pulse: 56       Visual Acuity      ED Medications  Medications - No data to display    Diagnostic Studies  Results Reviewed     Procedure Component Value Units Date/Time    Basic metabolic panel [[id number]]  (Abnormal) Collected:  12/13/18 1427    Lab Status:  Final result Specimen:  Blood from Arm, Right Updated:  12/13/18 1442     Sodium 140 mmol/L      Potassium 3 3 (L) mmol/L      Chloride 102 mmol/L      CO2 30 mmol/L      ANION GAP 8 mmol/L      BUN 11 mg/dL      Creatinine 0 60 mg/dL      Glucose 74 mg/dL      Calcium 8 6 mg/dL      eGFR 120 ml/min/1 73sq m     Narrative:         National Kidney Disease Education Program recommendations are as follows:  GFR calculation is accurate only with a steady state creatinine  Chronic Kidney disease less than 60 ml/min/1 73 sq  meters  Kidney failure less than 15 ml/min/1 73 sq  meters  [facility] amplified DNA by PCR [[id number]] Collected:  12/13/18 1436    Lab Status: In process Specimen:  Urine, Other Updated:  12/13/18 1438    Urine Microscopic [[id number]] Collected:  12/13/18 1432    Lab Status:   In process Specimen:  Urine from Urine, Clean Catch Updated:  12/13/18 1438    POCT pregnancy, urine [[id number]]  (Normal) Resulted:  12/13/18 1435    Lab Status:  Final result Updated:  12/13/18 1435     EXT PREG TEST UR (Ref: Negative) negative    ED Urine Macroscopic [[id number]]  (Abnormal) Collected:  12/13/18 1432    Lab Status:  Final result Specimen:  Urine Updated:  12/13/18 1434     Color, UA Orange     Clarity, UA Clear     pH, UA 5 5     Leukocytes, UA Trace (A)     Nitrite, UA Negative     Protein, UA Trace (A) mg/dl      Glucose, UA Negative mg/dl      Ketones, UA 40 (2+) (A) mg/dl      Urobilinogen, UA 1 0 E U /dl      Bilirubin, UA Interference- unable to analyze (A)     Blood, UA Negative     Specific Gravity, UA >=1 030    [facility] RESULT    RPR [[id number]] Collected:  12/13/18 1427    Lab Status: In process Specimen:  Blood from Arm, Right Updated:  12/13/18 1430    Hepatitis panel, acute [[id number]] Collected:  12/13/18 1427    Lab Status: In process Specimen:  Blood from Arm, Right Updated:  12/13/18 1430    HIV 1/2 AG-AB combo [[id number]] Collected:  12/13/18 1427    Lab Status: In process Specimen:  Blood from Arm, Right Updated:  12/13/18 1430                 No orders to display              Procedures  Procedures       Phone Contacts  ED Phone Contact    ED Course                               MDM  Number of Diagnoses or Management Options  Hypokalemia: new and requires workup  Mild dehydration: new and requires workup  Possible exposure to STD: new and requires workup  Diagnosis management comments: 59-year-old female presented for evaluation of possible STD after finding out that her fiance was cheating on her  She has had some slight pelvic discomfort and a foul order  No sign of UTI  She does have ketone urea  Notes that she has been losing a significant amount of weight intentionally and this is likely the etiology of that  Urine very concentrated  Noted that she should drink more water  No sign of acute UTI  GC/chlamydia, HIV, hepatitis, RPR pending  Mild hypokalemia  Plan dietary changes         Amount and/or Complexity of Data Reviewed  Clinical lab tests: ordered and reviewed    Patient Progress  Patient progress: stable    CritCare Time    Disposition  Final diagnoses:   Mild dehydration   Possible exposure to STD   Hypokalemia     Time reflects when diagnosis was documented in both MDM as applicable and the Disposition within this note     Time User Action Codes Description Comment    12/13/2018  2:55 PM Emy Goel  22  [E86 0] Mild dehydration     12/13/2018  2:55 PM Stevenson Gilford, Alayne Eans Add [C50 648] Maternal STD affecting childbirth     12/13/2018  2:55 PM Valverde Laura, [address] [facility] Maternal STD affecting childbirth     12/13/2018  2:55 PM Emy Goel [address]  [Z20 2] Possible exposure to STD     12/13/2018  2:55 PM Rodriguez Goel Add [E87 6] Hypokalemia       ED Disposition     ED Disposition Condition Comment    Discharge  [address] discharge to home/self care  Condition at discharge: Good        Follow-up Information     Follow up With Specialties Details Why Contact Info Additional [id number] [facility],  Internal Medicine   [address]  2nd Floor, [address] Emergency Department Emergency Medicine  If symptoms worsen [address] [facility] [address] [phone] AN ED, [address], [place], [place], [address]          Patient's Medications   Discharge Prescriptions    No medications on file     No discharge procedures on file      ED Provider  Electronically Signed by           Igor Amezcua MD  12/13/18 8284

## 2018-12-13 NOTE — DISCHARGE INSTRUCTIONS
Dehydration   WHAT YOU NEED TO KNOW:   Dehydration is a condition that develops when your body does not have enough fluid  You may become dehydrated if you do not drink enough water or lose too much fluid  Fluid loss may also cause loss of electrolytes (minerals), such as sodium  DISCHARGE INSTRUCTIONS:   Return to the emergency department if:   · You have a seizure  · You are confused or cannot think clearly  · You are extremely sleepy, or another person cannot wake you  · You become dizzy or faint when you stand  · You are not able to urinate  · You have trouble breathing  · You have a fast or irregular heartbeat  · Your hands or feet are cold, or your face is pale  Contact your healthcare provider if:   · You have trouble drinking liquids because you are vomiting  · Your symptoms get worse  · You have a fever  · You feel very weak or tired  · You have questions or concerns about your condition or care  Follow up with your healthcare provider as directed:  Write down your questions so you remember to ask them during your visits  Prevent or manage dehydration:   · Drink liquids as directed  Liquids that contain water, sugar, and minerals can help your body hold in fluid and help prevent dehydration  Drink liquids throughout the day, not just when you feel thirsty  Men should drink about 3 liters (13 eight-ounce cups) of liquid each day  Women should drink about 2 liters (9 eight-ounce cups) of liquid each day  Drink even more liquid if you will be outdoors, in the sun for a long time, or exercising  · Stay cool  Limit the time you spend outdoors during the hottest part of the day  Dress in lightweight clothes  · Keep track of how often you urinate  If you urinate less than usual or your urine is darker, drink more liquids    © 2017 [employer]nie Ol[employer] Information is for End User's use only and may not be sold, redistributed or otherwise used for commercial purposes  All illustrations and images included in CareNotes® are the copyrighted property of A D A M , Inc  or Gregor Cohen  The above information is an  only  It is not intended as medical advice for individual conditions or treatments  Talk to your doctor, nurse or pharmacist before following any medical regimen to see if it is safe and effective for you  Hypokalemia   WHAT YOU NEED TO KNOW:   Hypokalemia is a low level of potassium in your blood  Potassium helps control how your muscles, heart, and digestive system work  Hypokalemia occurs when your body loses too much potassium or does not absorb enough from food  DISCHARGE INSTRUCTIONS:   Return to the emergency department if:   · You cannot move your arm or leg  · You have a fast or irregular heartbeat  · You are too tired or weak to stand up  Contact your healthcare provider if:   · You are vomiting, or you have diarrhea  · You have numbness or tingling in your arms or legs  · Your symptoms do not go away or they get worse  · You have questions or concerns about your condition or care  Medicines:   · Potassium  will be given to bring your potassium levels back to normal     · Take your medicine as directed  Contact your healthcare provider if you think your medicine is not helping or if you have side effects  Tell him of her if you are allergic to any medicine  Keep a list of the medicines, vitamins, and herbs you take  Include the amounts, and when and why you take them  Bring the list or the pill bottles to follow-up visits  Carry your medicine list with you in case of an emergency  Eat foods that are high in potassium:  Foods that are high in potassium include bananas, oranges, tomatoes, potatoes, and avocado  Shelton beans, turkey, salmon, lean beef, yogurt, and milk are also high in potassium  Ask your healthcare provider or dietitian for more information about foods that are high in potassium     Follow up with your healthcare provider as directed:  Write down your questions so you remember to ask them during your visits  © 2017 [address] [employer] Information is for End User's use only and may not be sold, redistributed or otherwise used for commercial purposes  All illustrations and images included in CareNotes® are the copyrighted property of A D A M , Inc  or Gregor Cohen  The above information is an  only  It is not intended as medical advice for individual conditions or treatments  Talk to your doctor, nurse or pharmacist before following any medical regimen to see if it is safe and effective for you

## 2018-12-14 LAB
HAV IGM SER QL: NORMAL
HBV CORE IGM SER QL: NORMAL
HBV SURFACE AG SER QL: NORMAL
HCV AB SER QL: NORMAL
HIV 1+2 AB+HIV1 P24 AG SERPL QL IA: NORMAL
RPR SER QL: NORMAL

## 2019-01-31 DIAGNOSIS — E55.9 VITAMIN D DEFICIENCY: ICD-10-CM

## 2019-01-31 RX ORDER — ERGOCALCIFEROL 1.25 MG/1
50000 CAPSULE ORAL
Qty: 8 CAPSULE | Refills: 1 | OUTPATIENT
Start: 2019-01-31

## 2019-03-18 ENCOUNTER — APPOINTMENT (EMERGENCY)
Dept: CT IMAGING | Facility: HOSPITAL | Age: 34
End: 2019-03-18
Payer: COMMERCIAL

## 2019-03-18 ENCOUNTER — HOSPITAL ENCOUNTER (EMERGENCY)
Facility: HOSPITAL | Age: 34
Discharge: HOME | End: 2019-03-18
Attending: EMERGENCY MEDICINE
Payer: COMMERCIAL

## 2019-03-18 ENCOUNTER — APPOINTMENT (EMERGENCY)
Dept: RADIOLOGY | Facility: HOSPITAL | Age: 34
End: 2019-03-18
Payer: COMMERCIAL

## 2019-03-18 VITALS
OXYGEN SATURATION: 100 % | SYSTOLIC BLOOD PRESSURE: 112 MMHG | HEART RATE: 53 BPM | RESPIRATION RATE: 18 BRPM | TEMPERATURE: 98.3 F | WEIGHT: 281 LBS | HEIGHT: 64 IN | BODY MASS INDEX: 47.97 KG/M2 | DIASTOLIC BLOOD PRESSURE: 55 MMHG

## 2019-03-18 DIAGNOSIS — R00.1 SINUS BRADYCARDIA: ICD-10-CM

## 2019-03-18 DIAGNOSIS — R42 DIZZINESS: Primary | ICD-10-CM

## 2019-03-18 LAB
ALBUMIN SERPL BCP-MCNC: 3.9 G/DL (ref 3.5–5)
ALP SERPL-CCNC: 54 U/L (ref 46–116)
ALT SERPL W P-5'-P-CCNC: 28 U/L (ref 12–78)
ANION GAP SERPL CALCULATED.3IONS-SCNC: 8 MMOL/L (ref 4–13)
AST SERPL W P-5'-P-CCNC: 14 U/L (ref 5–45)
BACTERIA UR QL AUTO: ABNORMAL /HPF
BASOPHILS # BLD AUTO: 0.03 THOUSANDS/ΜL (ref 0–0.1)
BASOPHILS NFR BLD AUTO: 0 % (ref 0–1)
BILIRUB SERPL-MCNC: 0.6 MG/DL (ref 0.2–1)
BILIRUB UR QL STRIP: NEGATIVE
BUN SERPL-MCNC: 13 MG/DL (ref 5–25)
CALCIUM SERPL-MCNC: 9 MG/DL (ref 8.3–10.1)
CHLORIDE SERPL-SCNC: 101 MMOL/L (ref 100–108)
CLARITY UR: ABNORMAL
CO2 SERPL-SCNC: 29 MMOL/L (ref 21–32)
COLOR UR: YELLOW
CREAT SERPL-MCNC: 0.77 MG/DL (ref 0.6–1.3)
EOSINOPHIL # BLD AUTO: 0.13 THOUSAND/ΜL (ref 0–0.61)
EOSINOPHIL NFR BLD AUTO: 2 % (ref 0–6)
ERYTHROCYTE [DISTWIDTH] IN BLOOD BY AUTOMATED COUNT: 11.3 % (ref 11.6–15.1)
EXT PREG TEST URINE: NEGATIVE
GFR SERPL CREATININE-BSD FRML MDRD: 102 ML/MIN/1.73SQ M
GLUCOSE SERPL-MCNC: 79 MG/DL (ref 65–140)
GLUCOSE UR STRIP-MCNC: NEGATIVE MG/DL
HCT VFR BLD AUTO: 39.8 % (ref 34.8–46.1)
HGB BLD-MCNC: 12.9 G/DL (ref 11.5–15.4)
HGB UR QL STRIP.AUTO: ABNORMAL
IMM GRANULOCYTES # BLD AUTO: 0.01 THOUSAND/UL (ref 0–0.2)
IMM GRANULOCYTES NFR BLD AUTO: 0 % (ref 0–2)
KETONES UR STRIP-MCNC: NEGATIVE MG/DL
LEUKOCYTE ESTERASE UR QL STRIP: ABNORMAL
LYMPHOCYTES # BLD AUTO: 3.32 THOUSANDS/ΜL (ref 0.6–4.47)
LYMPHOCYTES NFR BLD AUTO: 45 % (ref 14–44)
MCH RBC QN AUTO: 32.3 PG (ref 26.8–34.3)
MCHC RBC AUTO-ENTMCNC: 32.4 G/DL (ref 31.4–37.4)
MCV RBC AUTO: 100 FL (ref 82–98)
MONOCYTES # BLD AUTO: 0.45 THOUSAND/ΜL (ref 0.17–1.22)
MONOCYTES NFR BLD AUTO: 6 % (ref 4–12)
NEUTROPHILS # BLD AUTO: 3.43 THOUSANDS/ΜL (ref 1.85–7.62)
NEUTS SEG NFR BLD AUTO: 47 % (ref 43–75)
NITRITE UR QL STRIP: NEGATIVE
NON-SQ EPI CELLS URNS QL MICRO: ABNORMAL /HPF
NRBC BLD AUTO-RTO: 0 /100 WBCS
PH UR STRIP.AUTO: 5.5 [PH] (ref 4.5–8)
PLATELET # BLD AUTO: 191 THOUSANDS/UL (ref 149–390)
PMV BLD AUTO: 10.5 FL (ref 8.9–12.7)
POTASSIUM SERPL-SCNC: 3.7 MMOL/L (ref 3.5–5.3)
PROT SERPL-MCNC: 8.4 G/DL (ref 6.4–8.2)
PROT UR STRIP-MCNC: NEGATIVE MG/DL
RBC # BLD AUTO: 4 MILLION/UL (ref 3.81–5.12)
RBC #/AREA URNS AUTO: ABNORMAL /HPF
SODIUM SERPL-SCNC: 138 MMOL/L (ref 136–145)
SP GR UR STRIP.AUTO: 1.01 (ref 1–1.03)
TROPONIN I SERPL-MCNC: <0.02 NG/ML
TSH SERPL DL<=0.05 MIU/L-ACNC: 1.8 UIU/ML (ref 0.36–3.74)
UROBILINOGEN UR QL STRIP.AUTO: 0.2 E.U./DL
WBC # BLD AUTO: 7.37 THOUSAND/UL (ref 4.31–10.16)
WBC #/AREA URNS AUTO: ABNORMAL /HPF

## 2019-03-18 PROCEDURE — 93005 ELECTROCARDIOGRAM TRACING: CPT

## 2019-03-18 PROCEDURE — 96361 HYDRATE IV INFUSION ADD-ON: CPT

## 2019-03-18 PROCEDURE — 81003 URINALYSIS AUTO W/O SCOPE: CPT

## 2019-03-18 PROCEDURE — 71046 X-RAY EXAM CHEST 2 VIEWS: CPT

## 2019-03-18 PROCEDURE — 70450 CT HEAD/BRAIN W/O DYE: CPT

## 2019-03-18 PROCEDURE — 81001 URINALYSIS AUTO W/SCOPE: CPT

## 2019-03-18 PROCEDURE — 96360 HYDRATION IV INFUSION INIT: CPT

## 2019-03-18 PROCEDURE — 80053 COMPREHEN METABOLIC PANEL: CPT | Performed by: PHYSICIAN ASSISTANT

## 2019-03-18 PROCEDURE — 87086 URINE CULTURE/COLONY COUNT: CPT

## 2019-03-18 PROCEDURE — 84443 ASSAY THYROID STIM HORMONE: CPT | Performed by: PHYSICIAN ASSISTANT

## 2019-03-18 PROCEDURE — 81025 URINE PREGNANCY TEST: CPT | Performed by: PHYSICIAN ASSISTANT

## 2019-03-18 PROCEDURE — 85025 COMPLETE CBC W/AUTO DIFF WBC: CPT | Performed by: PHYSICIAN ASSISTANT

## 2019-03-18 PROCEDURE — 84484 ASSAY OF TROPONIN QUANT: CPT | Performed by: PHYSICIAN ASSISTANT

## 2019-03-18 PROCEDURE — 36415 COLL VENOUS BLD VENIPUNCTURE: CPT | Performed by: PHYSICIAN ASSISTANT

## 2019-03-18 PROCEDURE — 99285 EMERGENCY DEPT VISIT HI MDM: CPT

## 2019-03-18 RX ORDER — MECLIZINE HCL 12.5 MG/1
25 TABLET ORAL ONCE
Status: COMPLETED | OUTPATIENT
Start: 2019-03-18 | End: 2019-03-18

## 2019-03-18 RX ORDER — MECLIZINE HYDROCHLORIDE 25 MG/1
25 TABLET ORAL EVERY 8 HOURS PRN
Qty: 20 TABLET | Refills: 0 | Status: SHIPPED | OUTPATIENT
Start: 2019-03-18 | End: 2021-03-30

## 2019-03-18 RX ADMIN — SODIUM CHLORIDE 1000 ML: 0.9 INJECTION, SOLUTION INTRAVENOUS at 20:20

## 2019-03-18 RX ADMIN — MECLIZINE 25 MG: 12.5 TABLET ORAL at 20:15

## 2019-03-18 NOTE — ED PROVIDER NOTES
History  Chief Complaint   Patient presents with    Dizziness     "for the past two weeks I have been having dizzy spells" coming on more frequently and lasting longer than normal  "feels like I am spinning " pt has been losing balance  Seven Santiago is a 35 y o  Female with a PMHX of Asthma, Depression, Obesity who presents to the ED with complaints of intermittent dizziness (lasting a few seconds to 30-45 minutes) which the patient describes as if she "spun herself on a chair a million times and tried to get up" x 2 weeks  Patient states dizziness can be alleviated by closing eyes and sitting down but episodes have been lasting longer  Patient states episodes are occurring daily  Patient states while she was on an  (which goes up and down) at a higher elevation today at work, she started having symptoms  Patient states symptoms are worsened with bending over and rapid head movements  Patient states she had 1 episode of palpitations yesterday which resolved without intervention  Patient states her vision feels cloudy and eye pain (described as stinging like her contacts were in too long) during episodes  Patient endorses intermittent mild bilateral tinnitus  Patient states during episode she has nausea and has had a few episodes of vomiting  Denies syncope, photophobia, headache, changes in hearing, cough, congestion, sore throat, dysphagia, weakness, numbness, tingling, abdominal pain, urinary symptoms, neck pain, neck stiffness, abdominal pain, diarrhea, sick contacts, chest pain, shortness of breath, fever, chills  Denies drug or alcohol use  Patient has an appointment with PCP scheduled for Wednesday         History provided by:  Patient  Dizziness   Quality:  Head spinning  Severity:  Moderate  Duration:  2 weeks  Timing:  Intermittent  Progression:  Worsening  Context: bending over and head movement    Relieved by:  Closing eyes, being still and lying down  Associated symptoms: nausea, palpitations (Resolved), tinnitus, vision changes and vomiting    Associated symptoms: no blood in stool, no chest pain, no diarrhea, no headaches, no hearing loss, no shortness of breath, no syncope and no weakness        Prior to Admission Medications   Prescriptions Last Dose Informant Patient Reported? Taking? Etonogestrel (NEXPLANON) 68 MG IMPL   Yes Yes   Sig: Inject under the skin   albuterol (2 5 mg/3 mL) 0 083 % nebulizer solution   Yes Yes   Sig: INHALE 1 VIAL VIA NEBULIZER EVERY 4 HOURS AS NEEDED   albuterol (PROVENTIL HFA,VENTOLIN HFA) 90 mcg/act inhaler   No Yes   Sig: Inhale 2 puffs every 6 (six) hours as needed for wheezing   fexofenadine (ALLEGRA) 30 MG tablet   Yes Yes   Sig: Take 30 mg by mouth 2 (two) times a day  Facility-Administered Medications: None       Past Medical History:   Diagnosis Date    Asthma     Chronic pain     Depression     Insomnia     Major depressive disorder, recurrent episode, severe (HCC)     Seasonal allergies        Past Surgical History:   Procedure Laterality Date    ABCESS DRAINAGE      Incision and dranage of skin abscess    NO PAST SURGERIES      UPPER GASTROINTESTINAL ENDOSCOPY         Family History   Problem Relation Age of Onset    Other Father         Cardiac disorder    Hypertension Father     Diabetes Maternal Grandmother      I have reviewed and agree with the history as documented  Social History     Tobacco Use    Smoking status: Never Smoker    Smokeless tobacco: Never Used   Substance Use Topics    Alcohol use: No    Drug use: No        Review of Systems   Constitutional: Negative for appetite change, chills, fever and unexpected weight change  HENT: Positive for tinnitus  Negative for congestion, drooling, ear pain, hearing loss, rhinorrhea, sore throat, trouble swallowing and voice change  Eyes: Positive for pain and visual disturbance  Negative for discharge and redness     Respiratory: Negative for cough, shortness of breath, wheezing and stridor  Cardiovascular: Positive for palpitations (Resolved)  Negative for chest pain, leg swelling and syncope  Gastrointestinal: Positive for nausea and vomiting  Negative for abdominal pain, blood in stool, constipation and diarrhea  Genitourinary: Negative for dysuria, flank pain, frequency, hematuria and urgency  Musculoskeletal: Negative for gait problem, joint swelling, neck pain and neck stiffness  Skin: Negative for color change and rash  Neurological: Positive for dizziness  Negative for seizures, weakness, light-headedness and headaches  Physical Exam  Physical Exam   Constitutional: She is oriented to person, place, and time  Vital signs are normal  She appears well-developed and well-nourished  She is cooperative  Non-toxic appearance  No distress  HENT:   Head: Normocephalic and atraumatic  Right Ear: Hearing, tympanic membrane, external ear and ear canal normal    Left Ear: Hearing, tympanic membrane, external ear and ear canal normal    Nose: Nose normal    Mouth/Throat: Uvula is midline, oropharynx is clear and moist and mucous membranes are normal    Eyes: Pupils are equal, round, and reactive to light  Conjunctivae, EOM and lids are normal  Right eye exhibits no nystagmus  Left eye exhibits no nystagmus  Neck: Trachea normal, normal range of motion, full passive range of motion without pain and phonation normal  Neck supple  No spinous process tenderness and no muscular tenderness present  No neck rigidity  Normal range of motion present  Cardiovascular: Normal rate, regular rhythm and intact distal pulses  Pulmonary/Chest: Effort normal and breath sounds normal    Abdominal: Soft  Bowel sounds are normal  There is no tenderness  Musculoskeletal: Normal range of motion  Neurological: She is alert and oriented to person, place, and time  She has normal strength and normal reflexes  No cranial nerve deficit or sensory deficit   GCS eye subscore is 4  GCS verbal subscore is 5  GCS motor subscore is 6  Alert and oriented to person, place, and time  PERRL and 2 mm symmetrical, EOMI with no evidence of nystagmus  Visual pursuits were smooth with normal saccadic eye movements  Facial sensation intact b/l with no evidence of facial asymmetry  Hearing was normal b/l and the tongue and palate were in midline  SCM and upper trapezius strength normal b/l  No evidence of postural or action tremor, No dysmetria seen on FTN testing   No evidence of sensory deficits    Skin: Skin is warm and dry  Capillary refill takes less than 2 seconds  Psychiatric: She has a normal mood and affect  Nursing note and vitals reviewed        Vital Signs  ED Triage Vitals   Temperature Pulse Respirations Blood Pressure SpO2   03/18/19 1850 03/18/19 1850 03/18/19 1850 03/18/19 1850 03/18/19 1850   98 3 °F (36 8 °C) 69 18 144/63 100 %      Temp Source Heart Rate Source Patient Position - Orthostatic VS BP Location FiO2 (%)   03/18/19 1850 03/18/19 2108 03/18/19 2108 03/18/19 2108 --   Oral Monitor Lying Left arm       Pain Score       03/18/19 2108       No Pain           Vitals:    03/18/19 1850 03/18/19 2108   BP: 144/63 112/55   Pulse: 69 (!) 53   Patient Position - Orthostatic VS:  Lying       qSOFA     Row Name 03/18/19 2108 03/18/19 1930 03/18/19 1850          Altered mental status GCS < 15    0        Respiratory Rate > / =22  0    0      Systolic BP < / =523  0    0      Q Sofa Score  0  0  0            Visual Acuity      ED Medications  Medications   sodium chloride 0 9 % bolus 1,000 mL (1,000 mL Intravenous New Bag 3/18/19 2020)   meclizine (ANTIVERT) tablet 25 mg (25 mg Oral Given 3/18/19 2015)       Diagnostic Studies  Results Reviewed     Procedure Component Value Units Date/Time    POCT pregnancy, urine [[id number]]  (Normal) Resulted:  03/18/19 2106    Lab Status:  Final result Updated:  03/18/19 2107     EXT PREG TEST UR (Ref: Negative) Negative    TSH [[id number]] (Normal) Collected:  03/18/19 2017    Lab Status:  Final result Specimen:  Blood from Arm, Right Updated:  03/18/19 2053     TSH 3RD GENERATON 1 802 uIU/mL     Narrative:       Patients undergoing fluorescein dye angiography may retain small amounts of fluorescein in the body for 48-72 hours post procedure  Samples containing fluorescein can produce falsely depressed TSH values  If the patient had this procedure,a specimen should be resubmitted post fluorescein clearance  Urine Microscopic [[id number]]  (Abnormal) Collected:  03/18/19 2026    Lab Status:  Final result Specimen:  Urine, Clean Catch Updated:  03/18/19 2051     RBC, UA       Field obscured, unable to enumerate     /hpf     WBC, UA       Field obscured, unable to enumerate     /hpf     Epithelial Cells Innumerable /hpf      Bacteria, UA Innumerable /hpf     Urine culture [[id number]] Collected:  03/18/19 2026    Lab Status:   In process Specimen:  Urine, Clean Catch Updated:  03/18/19 2051    Troponin I [[id number]]  (Normal) Collected:  03/18/19 2017    Lab Status:  Final result Specimen:  Blood from Arm, Right Updated:  03/18/19 2045     Troponin I <0 02 ng/mL     Comprehensive metabolic panel [[id number]]  (Abnormal) Collected:  03/18/19 2017    Lab Status:  Final result Specimen:  Blood from Arm, Right Updated:  03/18/19 2043     Sodium 138 mmol/L      Potassium 3 7 mmol/L      Chloride 101 mmol/L      CO2 29 mmol/L      ANION GAP 8 mmol/L      BUN 13 mg/dL      Creatinine 0 77 mg/dL      Glucose 79 mg/dL      Calcium 9 0 mg/dL      AST 14 U/L      ALT 28 U/L      Alkaline Phosphatase 54 U/L      Total Protein 8 4 g/dL      Albumin 3 9 g/dL      Total Bilirubin 0 60 mg/dL      eGFR 102 ml/min/1 73sq m     Narrative:       National Kidney Disease Education Program recommendations are as follows:  GFR calculation is accurate only with a steady state creatinine  Chronic Kidney disease less than 60 ml/min/1 73 sq  meters  Kidney failure less than 15 ml/min/1 73 sq  meters  CBC and differential [[id number]]  (Abnormal) Collected:  03/18/19 2017    Lab Status:  Final result Specimen:  Blood from Arm, Right Updated:  03/18/19 2027     WBC 7 37 Thousand/uL      RBC 4 00 Million/uL      Hemoglobin 12 9 g/dL      Hematocrit 39 8 %       fL      MCH 32 3 pg      MCHC 32 4 g/dL      RDW 11 3 %      MPV 10 5 fL      Platelets 100 Thousands/uL      nRBC 0 /100 WBCs      Neutrophils Relative 47 %      Immat GRANS % 0 %      Lymphocytes Relative 45 %      Monocytes Relative 6 %      Eosinophils Relative 2 %      Basophils Relative 0 %      Neutrophils Absolute 3 43 Thousands/µL      Immature Grans Absolute 0 01 Thousand/uL      Lymphocytes Absolute 3 32 Thousands/µL      Monocytes Absolute 0 45 Thousand/µL      Eosinophils Absolute 0 13 Thousand/µL      Basophils Absolute 0 03 Thousands/µL     ED Urine Macroscopic [[id number]]  (Abnormal) Collected:  03/18/19 2026    Lab Status:  Final result Specimen:  Urine Updated:  03/18/19 2026     Color, UA Yellow     Clarity, UA Cloudy     pH, UA 5 5     Leukocytes, UA Small     Nitrite, UA Negative     Protein, UA Negative mg/dl      Glucose, UA Negative mg/dl      Ketones, UA Negative mg/dl      Urobilinogen, UA 0 2 E U /dl      Bilirubin, UA Negative     Blood, UA Large     Specific Spelter, UA 1 010    Narrative:       [facility] RESULT                 CT head without contrast   Final Result by Sravan Jimenez DO (03/18 2239)      No acute intracranial abnormality                    Workstation performed: [id number]         XR chest 2 views   ED Interpretation by Gwyn Moses PA-C (03/18 2211)   No consolidation                 Procedures  ECG 12 Lead Documentation  Date/Time: 3/18/2019 8:19 PM  Performed by: Gwyn Moses PA-C  Authorized by: Gwyn Moses PA-C     Indications / Diagnosis:  Dizziness  ECG reviewed by me, the ED Provider: yes    Patient location:  ED  Previous ECG:     Previous ECG:  Compared to current    Comparison ECG info:  06/16/17, NSR 89    Similarity:  Changes noted    Comparison to cardiac monitor: Yes    Interpretation:     Interpretation: abnormal    Rate:     ECG rate:  56  Rhythm:     Rhythm: sinus bradycardia    QRS:     QRS axis:  Normal  ST segments:     ST segments:  Normal  T waves:     T waves: inverted      Inverted:  III  Comments:      No acute ST or T wave changes  QT//422  Phone Contacts  ED Phone Contact    ED Course  ED Course as of Mar 18 2249   Mon Mar 18, 2019   2025 Sinus bradycardia (46-52) on the monitor  Will observe  2237 Patient has ambulated to and from the bathroom on multiple occasions without episodes of dizziness  0 Educated patient regarding diagnosis and management  Advised patient to follow up with PCP  Advised patient to RTER for persistent or worsening symptoms  MDM  Number of Diagnoses or Management Options  Dizziness: new and does not require workup  Sinus bradycardia: new and does not require workup  Diagnosis management comments: Differential diagnosis included but not limited to: Dizziness, dehydration, vertigo, sinus bradycardia     EKG was obtained and independently interpreted by myself: Sinus bradycardia, rate 56  NAD  No acute ST or T wave changes  QT//422  Labs WNL  CXR without acute findings  CT head without acute findings  Patient pain asymptomatic while in emergency room  Patient did ambulate to the bathroom multiple times without episodes of dizziness  Will treat for nausea and dizziness with the child meclizine  Patient has appointment with her primary care provider for Wednesday  Patient was given strict precautions at work regarding dizziness  I provided patient with strict RTER precautions  I advised patient follow-up with PCP in 24-48 hours  Patient verbalized understanding            Amount and/or Complexity of Data Reviewed  Clinical lab tests: reviewed and ordered  Tests in the radiology section of CPT®: ordered and reviewed  Review and summarize past medical records: yes    Risk of Complications, Morbidity, and/or Mortality  Presenting problems: moderate  Diagnostic procedures: moderate  Management options: moderate    Patient Progress  Patient progress: stable      Disposition  Final diagnoses:   Dizziness   Sinus bradycardia     Time reflects when diagnosis was documented in both MDM as applicable and the Disposition within this note     Time User Action Codes Description Comment    3/18/2019 10:41 PM Ita Ore Add [R42] Dizziness     3/18/2019 10:41 PM Ita Ore Add [R00 1] Sinus bradycardia       ED Disposition     ED Disposition Condition Date/Time Comment    Discharge Stable Mon Mar 18, 2019 10:41 PM [address] discharge to home/self care  Follow-up Information     Follow up With Specialties Details Why Contact Info Additional [address] Emergency Department Emergency Medicine Go to  If symptoms worsen [address] [facility]  AN ED,  [address], [place], [place], [address] [place] [address] Internal Medicine Schedule an appointment as soon as possible for a visit   [address]  [address] Floor, [address] [place] [address],[address]  [phone]             Patient's Medications   Discharge Prescriptions    MECLIZINE (ANTIVERT) 25 MG TABLET    Take 1 tablet (25 mg total) by mouth every 8 (eight) hours as needed for dizziness or nausea       Start Date: 3/18/2019 End Date: --       Order Dose: 25 mg       Quantity: 20 tablet    Refills: 0     No discharge procedures on file      ED Provider  Electronically Signed by           Mayela Flores PA-C  03/18/19 6372

## 2019-03-19 NOTE — DISCHARGE INSTRUCTIONS
Dizziness   WHAT YOU NEED TO KNOW:   Dizziness is a feeling of being off balance or unsteady  Common causes of dizziness are an inner ear fluid imbalance or a lack of oxygen in your blood  Dizziness may be acute (lasts 3 days or less) or chronic (lasts longer than 3 days)  You may have dizzy spells that last from seconds to a few hours  DISCHARGE INSTRUCTIONS:   Return to the emergency department if:   · You have a headache and a stiff neck  · You have shaking chills and a fever  · You vomit over and over with no relief  · Your vomit or bowel movements are red or black  · You have pain in your chest, back, or abdomen  · You have numbness, especially in your face, arms, or legs  · You have trouble moving your arms or legs  · You are confused  Contact your healthcare provider if:   · You have a fever  · Your symptoms do not get better with treatment  · You have questions or concerns about your condition or care  Manage your symptoms:   · Do not drive  or operate heavy machinery when you are dizzy  · Get up slowly  from sitting or lying down  · Drink plenty of liquids  Liquids help prevent dehydration  Ask how much liquid to drink each day and which liquids are best for you  Follow up with your healthcare provider as directed:  Write down your questions so you remember to ask them during your visits  © 2017 [address]  Information is for End User's use only and may not be sold, redistributed or otherwise used for commercial purposes  All illustrations and images included in CareNotes® are the copyrighted property of A D A M , Inc  or Gregor Cohen  The above information is an  only  It is not intended as medical advice for individual conditions or treatments  Talk to your doctor, nurse or pharmacist before following any medical regimen to see if it is safe and effective for you        Vertigo   WHAT YOU NEED TO KNOW: Vertigo is a condition that causes you to feel dizzy  You may feel that you or everything around you is moving or spinning  You may also feel like you are being pulled down or toward your side  DISCHARGE INSTRUCTIONS:   Return to the emergency department if:   · You have a headache and a stiff neck  · You have shaking chills and a fever  · You vomit over and over with no relief  · You have blood, pus, or fluid coming out of your ears  · You are confused  Contact your healthcare provider if:   · Your symptoms do not get better with treatment  · You have questions about your condition or care  Medicines:   · Medicine  may be given to help relieve your symptoms  · Take your medicine as directed  Contact your healthcare provider if you think your medicine is not helping or if you have side effects  Tell him or her if you are allergic to any medicine  Keep a list of the medicines, vitamins, and herbs you take  Include the amounts, and when and why you take them  Bring the list or the pill bottles to follow-up visits  Carry your medicine list with you in case of an emergency  Manage your symptoms:   · Do not drive , walk without help, or operate heavy machinery when you are dizzy  · Move slowly  when you move from one position to another position  Get up slowly from sitting or lying down  Sit or lie down right away if you feel dizzy  · Drink plenty of liquids  Liquids help prevent dehydration  Ask how much liquid to drink each day and which liquids are best for you  · Vestibular and balance rehabilitation therapy (VBRT)  is used to teach you exercises to improve your balance and strength  These exercises may help decrease your vertigo and improve your balance  Ask for more information about this therapy  Follow up with your healthcare provider as directed:  Write down your questions so you remember to ask them during your visits     © 2017 Natasha Olmos Information is for End User's use only and may not be sold, redistributed or otherwise used for commercial purposes  All illustrations and images included in CareNotes® are the copyrighted property of A D A M , Inc  or Gregor Cohen  The above information is an  only  It is not intended as medical advice for individual conditions or treatments  Talk to your doctor, nurse or pharmacist before following any medical regimen to see if it is safe and effective for you

## 2019-03-20 ENCOUNTER — OFFICE VISIT (OUTPATIENT)
Dept: INTERNAL MEDICINE CLINIC | Facility: CLINIC | Age: 34
End: 2019-03-20
Payer: COMMERCIAL

## 2019-03-20 VITALS
SYSTOLIC BLOOD PRESSURE: 110 MMHG | TEMPERATURE: 98.3 F | DIASTOLIC BLOOD PRESSURE: 62 MMHG | WEIGHT: 281 LBS | HEART RATE: 68 BPM | HEIGHT: 64 IN | RESPIRATION RATE: 16 BRPM | OXYGEN SATURATION: 97 % | BODY MASS INDEX: 47.97 KG/M2

## 2019-03-20 DIAGNOSIS — R42 DIZZINESS: Primary | ICD-10-CM

## 2019-03-20 LAB
ATRIAL RATE: 56 BPM
BACTERIA UR CULT: NORMAL
P AXIS: 34 DEGREES
PR INTERVAL: 158 MS
QRS AXIS: 0 DEGREES
QRSD INTERVAL: 86 MS
QT INTERVAL: 438 MS
QTC INTERVAL: 422 MS
T WAVE AXIS: 20 DEGREES
VENTRICULAR RATE: 56 BPM

## 2019-03-20 PROCEDURE — 99214 OFFICE O/P EST MOD 30 MIN: CPT | Performed by: INTERNAL MEDICINE

## 2019-03-20 PROCEDURE — 93010 ELECTROCARDIOGRAM REPORT: CPT | Performed by: INTERNAL MEDICINE

## 2019-03-20 NOTE — ASSESSMENT & PLAN NOTE
orthostats negative  Mildly positive noy hallpike maneuver  Agree with meclizine rx, though advised to decrease dose to 12 5mg daily to avoid drowsiness  Refer for vestibular therapy  Work note provided to avoid operating machinery  Suspect may be due rhinosinusitis, advised to start oral antihistamine and use nasal spray

## 2019-03-20 NOTE — LETTER
March 20, 2019     Patient: Hannah Dsouza   YOB: 1985   Date of Visit: 3/20/2019       To Whom it May Concern:    Edyta Griffin is under my professional care  She was seen in my office on 3/20/2019  Please excuse on 3/21/19  She may return to work with limitations on 3/25/19, she may not operate machinery  If you have any questions or concerns, please don't hesitate to call           Sincerely,          Raghu Boucher DO        CC: No Recipients

## 2019-03-20 NOTE — LETTER
March 20, 2019     Patient: Christian Agustin   YOB: 1985   Date of Visit: 3/20/2019       To Whom it May Concern:    Sal Veronika is under my professional care  She was seen in my office on 3/20/2019  Please excuse from 3/21-3/22/19  She may return to work with limitations on 3/25/19, she may not operate machinery       If you have any questions or concerns, please don't hesitate to call           Sincerely,          Carlyn Sanders DO        CC: No Recipients

## 2019-03-20 NOTE — PROGRESS NOTES
Assessment/Plan:    Dizziness  orthostats negative  Mildly positive noy hallpike maneuver  Agree with meclizine rx, though advised to decrease dose to 12 5mg daily to avoid drowsiness  Refer for vestibular therapy  Work note provided to avoid operating machinery  Suspect may be due rhinosinusitis, advised to start oral antihistamine and use nasal spray  1  Dizziness  Ambulatory referral to Physical Therapy       Subjective:      Patient ID: Meredith Rock is a 35 y o  female  HPI  80-year-old female with morbid obesity, vitamin D deficiency, asthma and MDD here for ER follow up  For two weeks she has been dizzy sporadically  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-  At work while she was harnessed in and was elevated, she felt sick, vomitted and dizzy  She presented to [facility] on 3/18/19  EKG showed mild sinus bradycardia  Labs unremarkable  She was given rx of meclizine, tried it with improvement of symptoms though the medication makes her drowsy  Symptoms are now occurring more frequently and occurring longer  She feels like she is spinning, swaying  While it occurs gets pressure between her eyes and sometimes fees nausea  She feels better when she lays down      The following portions of the patient's history were reviewed and updated as appropriate: allergies, current medications, past family history, past medical history, past social history, past surgical history and problem list     Current Outpatient Medications:     albuterol (2 5 mg/3 mL) 0 083 % nebulizer solution, INHALE 1 VIAL VIA NEBULIZER EVERY 4 HOURS AS NEEDED, Disp: , Rfl: 1    albuterol (PROVENTIL HFA,VENTOLIN HFA) 90 mcg/act inhaler, Inhale 2 puffs every 6 (six) hours as needed for wheezing, Disp: 18 g, Rfl: 2    Etonogestrel (NEXPLANON) 68 MG IMPL, Inject under the skin, Disp: , Rfl:     fexofenadine (ALLEGRA) 30 MG tablet, Take 30 mg by mouth 2 (two) times a day , Disp: , Rfl:     meclizine (ANTIVERT) 25 mg tablet, Take 1 tablet (25 mg total) by mouth every 8 (eight) hours as needed for dizziness or nausea, Disp: 20 tablet, Rfl: 0    Review of Systems   Constitutional:        +weight loss   HENT: Negative  Respiratory: Negative  Cardiovascular: Positive for palpitations  Negative for chest pain and leg swelling  Gastrointestinal: Positive for nausea  Neurological: Positive for dizziness and headaches  Negative for syncope  Objective:    /62 (BP Location: Left arm, Patient Position: Supine, Cuff Size: Large)   Pulse 68   Temp 98 3 °F (36 8 °C)   Resp 16   Ht 5' 4" (1 626 m)   Wt 127 kg (281 lb)   LMP 03/12/2019   SpO2 97%   BMI 48 23 kg/m²      Physical Exam   Constitutional: She is oriented to person, place, and time  She appears well-developed and well-nourished  HENT:   Right Ear: External ear normal    Left Ear: External ear normal    Nose: Nose normal    Mouth/Throat: No oropharyngeal exudate  R nasal mucosal edema   Neck: Neck supple  Cardiovascular: Normal rate, regular rhythm and normal heart sounds  Pulmonary/Chest: Effort normal and breath sounds normal  No stridor  No respiratory distress  Neurological: She is alert and oriented to person, place, and time  noy hallpike maneuver positive b/l   Psychiatric: She has a normal mood and affect  Vitals reviewed

## 2019-04-03 ENCOUNTER — OFFICE VISIT (OUTPATIENT)
Dept: INTERNAL MEDICINE CLINIC | Facility: CLINIC | Age: 34
End: 2019-04-03
Payer: COMMERCIAL

## 2019-04-03 VITALS
TEMPERATURE: 97.8 F | DIASTOLIC BLOOD PRESSURE: 68 MMHG | HEIGHT: 64 IN | OXYGEN SATURATION: 98 % | SYSTOLIC BLOOD PRESSURE: 110 MMHG | HEART RATE: 71 BPM | WEIGHT: 287 LBS | BODY MASS INDEX: 49 KG/M2 | RESPIRATION RATE: 16 BRPM

## 2019-04-03 DIAGNOSIS — R42 DIZZINESS: Primary | ICD-10-CM

## 2019-04-03 DIAGNOSIS — G44.209 TENSION-TYPE HEADACHE, NOT INTRACTABLE, UNSPECIFIED CHRONICITY PATTERN: ICD-10-CM

## 2019-04-03 PROCEDURE — 1036F TOBACCO NON-USER: CPT | Performed by: INTERNAL MEDICINE

## 2019-04-03 PROCEDURE — 99213 OFFICE O/P EST LOW 20 MIN: CPT | Performed by: INTERNAL MEDICINE

## 2019-04-03 PROCEDURE — 3008F BODY MASS INDEX DOCD: CPT | Performed by: INTERNAL MEDICINE

## 2019-06-21 ENCOUNTER — TRANSITIONAL CARE MANAGEMENT (OUTPATIENT)
Dept: INTERNAL MEDICINE CLINIC | Facility: CLINIC | Age: 34
End: 2019-06-21

## 2020-01-01 ENCOUNTER — HOSPITAL ENCOUNTER (EMERGENCY)
Facility: HOSPITAL | Age: 35
Discharge: HOME | End: 2020-01-01
Attending: EMERGENCY MEDICINE | Admitting: EMERGENCY MEDICINE
Payer: COMMERCIAL

## 2020-01-01 VITALS
HEART RATE: 91 BPM | RESPIRATION RATE: 18 BRPM | OXYGEN SATURATION: 99 % | DIASTOLIC BLOOD PRESSURE: 60 MMHG | TEMPERATURE: 101.6 F | SYSTOLIC BLOOD PRESSURE: 135 MMHG

## 2020-01-01 DIAGNOSIS — J11.1 INFLUENZA: Primary | ICD-10-CM

## 2020-01-01 PROCEDURE — 99283 EMERGENCY DEPT VISIT LOW MDM: CPT

## 2020-01-01 PROCEDURE — 99284 EMERGENCY DEPT VISIT MOD MDM: CPT | Performed by: EMERGENCY MEDICINE

## 2020-01-01 RX ORDER — OSELTAMIVIR PHOSPHATE 75 MG/1
75 CAPSULE ORAL EVERY 12 HOURS
Qty: 9 CAPSULE | Refills: 0 | Status: SHIPPED | OUTPATIENT
Start: 2020-01-02 | End: 2020-01-07

## 2020-01-01 RX ORDER — OSELTAMIVIR PHOSPHATE 75 MG/1
75 CAPSULE ORAL ONCE
Status: COMPLETED | OUTPATIENT
Start: 2020-01-01 | End: 2020-01-01

## 2020-01-01 RX ORDER — IBUPROFEN 400 MG/1
400 TABLET ORAL ONCE
Status: COMPLETED | OUTPATIENT
Start: 2020-01-01 | End: 2020-01-01

## 2020-01-01 RX ORDER — ACETAMINOPHEN 325 MG/1
650 TABLET ORAL ONCE
Status: COMPLETED | OUTPATIENT
Start: 2020-01-01 | End: 2020-01-01

## 2020-01-01 RX ADMIN — OSELTAMIVIR PHOSPHATE 75 MG: 75 CAPSULE ORAL at 18:05

## 2020-01-01 RX ADMIN — ACETAMINOPHEN 650 MG: 325 TABLET, FILM COATED ORAL at 16:40

## 2020-01-01 RX ADMIN — IBUPROFEN 400 MG: 400 TABLET ORAL at 18:05

## 2020-01-01 NOTE — ED PROVIDER NOTES
History  Chief Complaint   Patient presents with    Flu Symptoms     patient reports flu symptoms since yesterday  +cough, body aches, fever       History provided by:  Patient and spouse  Flu Symptoms   Presenting symptoms: cough, fatigue, fever, headache, myalgias, nausea, rhinorrhea and sore throat    Presenting symptoms: no diarrhea, no shortness of breath and no vomiting    Severity:  Moderate  Onset quality:  Gradual  Duration:  1 day  Progression:  Worsening  Chronicity:  New  Relieved by:  None tried  Worsened by:  Nothing  Ineffective treatments:  None tried  Associated symptoms: chills, decreased appetite and nasal congestion    Associated symptoms: no mental status change and no neck stiffness        Prior to Admission Medications   Prescriptions Last Dose Informant Patient Reported? Taking? Etonogestrel (NEXPLANON) 68 MG IMPL   Yes No   Sig: Inject under the skin   albuterol (2 5 mg/3 mL) 0 083 % nebulizer solution   Yes No   Sig: INHALE 1 VIAL VIA NEBULIZER EVERY 4 HOURS AS NEEDED   albuterol (PROVENTIL HFA,VENTOLIN HFA) 90 mcg/act inhaler   No No   Sig: Inhale 2 puffs every 6 (six) hours as needed for wheezing   fexofenadine (ALLEGRA) 30 MG tablet   Yes No   Sig: Take 30 mg by mouth 2 (two) times a day     meclizine (ANTIVERT) 25 mg tablet   No No   Sig: Take 1 tablet (25 mg total) by mouth every 8 (eight) hours as needed for dizziness or nausea      Facility-Administered Medications: None       Past Medical History:   Diagnosis Date    Asthma     Chronic pain     Depression     Insomnia     Major depressive disorder, recurrent episode, severe (HCC)     Seasonal allergies        Past Surgical History:   Procedure Laterality Date    ABCESS DRAINAGE      Incision and dranage of skin abscess    NO PAST SURGERIES      UPPER GASTROINTESTINAL ENDOSCOPY         Family History   Problem Relation Age of Onset    Other Father         Cardiac disorder    Hypertension Father     Diabetes Maternal Grandmother      I have reviewed and agree with the history as documented  Social History     Tobacco Use    Smoking status: Never Smoker    Smokeless tobacco: Never Used   Substance Use Topics    Alcohol use: No    Drug use: No        Review of Systems   Constitutional: Positive for chills, decreased appetite, fatigue and fever  Negative for activity change and diaphoresis  HENT: Positive for congestion, rhinorrhea and sore throat  Negative for sinus pressure  Eyes: Negative for pain and visual disturbance  Respiratory: Positive for cough  Negative for chest tightness, shortness of breath, wheezing and stridor  Cardiovascular: Negative for chest pain and palpitations  Gastrointestinal: Positive for nausea  Negative for abdominal distention, abdominal pain, constipation, diarrhea and vomiting  Genitourinary: Negative for dysuria and frequency  Musculoskeletal: Positive for myalgias  Negative for neck pain and neck stiffness  Skin: Negative for rash  Neurological: Positive for headaches  Negative for dizziness, speech difficulty, light-headedness and numbness  Physical Exam  Physical Exam   Constitutional: She is oriented to person, place, and time  She appears well-developed  No distress  HENT:   Head: Normocephalic and atraumatic  Eyes: Pupils are equal, round, and reactive to light  Neck: Normal range of motion  Neck supple  No tracheal deviation present  Cardiovascular: Normal rate, regular rhythm, normal heart sounds and intact distal pulses  No murmur heard  Pulmonary/Chest: Effort normal and breath sounds normal  No stridor  No respiratory distress  Abdominal: Soft  She exhibits no distension  There is no tenderness  There is no rebound and no guarding  Musculoskeletal: Normal range of motion  Neurological: She is alert and oriented to person, place, and time  Skin: Skin is warm and dry  She is not diaphoretic  No erythema  No pallor     Psychiatric: She has a normal mood and affect  Vitals reviewed  Vital Signs  ED Triage Vitals   Temperature Pulse Respirations Blood Pressure SpO2   01/01/20 1636 01/01/20 1636 01/01/20 1636 01/01/20 1640 01/01/20 1636   (!) 101 6 °F (38 7 °C) 91 18 135/60 99 %      Temp Source Heart Rate Source Patient Position - Orthostatic VS BP Location FiO2 (%)   01/01/20 1636 01/01/20 1636 01/01/20 1636 01/01/20 1636 --   Oral Monitor Sitting Right arm       Pain Score       --                  Vitals:    01/01/20 1636 01/01/20 1640   BP:  135/60   Pulse: 91    Patient Position - Orthostatic VS: Sitting Sitting         Visual Acuity      ED Medications  Medications   oseltamivir (TAMIFLU) capsule 75 mg (has no administration in time range)   ibuprofen (MOTRIN) tablet 400 mg (has no administration in time range)   acetaminophen (TYLENOL) tablet 650 mg (650 mg Oral Given 1/1/20 1640)       Diagnostic Studies  Results Reviewed     None                 No orders to display              Procedures  Procedures         ED Course                               MDM  Number of Diagnoses or Management Options  Influenza: new and requires workup  Diagnosis management comments: Clinical influenza, pt has asthma although no flare now, will tx with tamiflu         Amount and/or Complexity of Data Reviewed  Decide to obtain previous medical records or to obtain history from someone other than the patient: yes  Obtain history from someone other than the patient: yes  Review and summarize past medical records: yes          Disposition  Final diagnoses:   Influenza     Time reflects when diagnosis was documented in both MDM as applicable and the Disposition within this note     Time User Action Codes Description Comment    1/1/2020  6:01 PM Yi Murphy Add [J11 1] Influenza       ED Disposition     ED Disposition Condition Date/Time Comment    Discharge Stable Wed Jan 1, 2020  6:01 PM [address] discharge to home/self care              Follow-up Information    None         Patient's Medications   Discharge Prescriptions    OSELTAMIVIR (TAMIFLU) 75 MG CAPSULE    Take 1 capsule (75 mg total) by mouth every 12 (twelve) hours for 5 days       Start Date: 1/2/2020  End Date: 1/7/2020       Order Dose: 75 mg       Quantity: 9 capsule    Refills: 0     No discharge procedures on file      ED Provider  Electronically Signed by           Jarod Reardon DO  01/01/20 4155

## 2020-07-13 ENCOUNTER — HOSPITAL ENCOUNTER (EMERGENCY)
Facility: HOSPITAL | Age: 35
Discharge: HOME | End: 2020-07-13
Payer: COMMERCIAL

## 2020-07-13 VITALS
RESPIRATION RATE: 17 BRPM | SYSTOLIC BLOOD PRESSURE: 145 MMHG | HEART RATE: 82 BPM | TEMPERATURE: 100 F | OXYGEN SATURATION: 98 % | WEIGHT: 293 LBS | DIASTOLIC BLOOD PRESSURE: 70 MMHG | BODY MASS INDEX: 57.67 KG/M2

## 2020-07-13 DIAGNOSIS — L03.114 CELLULITIS OF LEFT ELBOW: Primary | ICD-10-CM

## 2020-07-13 PROCEDURE — 99283 EMERGENCY DEPT VISIT LOW MDM: CPT

## 2020-07-13 PROCEDURE — 87147 CULTURE TYPE IMMUNOLOGIC: CPT | Performed by: PHYSICIAN ASSISTANT

## 2020-07-13 PROCEDURE — 99284 EMERGENCY DEPT VISIT MOD MDM: CPT | Performed by: PHYSICIAN ASSISTANT

## 2020-07-13 PROCEDURE — 87186 SC STD MICRODIL/AGAR DIL: CPT | Performed by: PHYSICIAN ASSISTANT

## 2020-07-13 PROCEDURE — 87205 SMEAR GRAM STAIN: CPT | Performed by: PHYSICIAN ASSISTANT

## 2020-07-13 PROCEDURE — 10060 I&D ABSCESS SIMPLE/SINGLE: CPT | Performed by: PHYSICIAN ASSISTANT

## 2020-07-13 PROCEDURE — 87070 CULTURE OTHR SPECIMN AEROBIC: CPT | Performed by: PHYSICIAN ASSISTANT

## 2020-07-13 RX ORDER — SULFAMETHOXAZOLE AND TRIMETHOPRIM 800; 160 MG/1; MG/1
1 TABLET ORAL EVERY 12 HOURS SCHEDULED
Qty: 14 TABLET | Refills: 0 | Status: SHIPPED | OUTPATIENT
Start: 2020-07-13 | End: 2020-07-20

## 2020-07-13 RX ORDER — LIDOCAINE HYDROCHLORIDE AND EPINEPHRINE 10; 10 MG/ML; UG/ML
1 INJECTION, SOLUTION INFILTRATION; PERINEURAL ONCE
Status: COMPLETED | OUTPATIENT
Start: 2020-07-13 | End: 2020-07-13

## 2020-07-13 RX ORDER — SULFAMETHOXAZOLE AND TRIMETHOPRIM 800; 160 MG/1; MG/1
1 TABLET ORAL ONCE
Status: COMPLETED | OUTPATIENT
Start: 2020-07-13 | End: 2020-07-13

## 2020-07-13 RX ORDER — IBUPROFEN 600 MG/1
600 TABLET ORAL EVERY 6 HOURS PRN
Qty: 30 TABLET | Refills: 0 | Status: SHIPPED | OUTPATIENT
Start: 2020-07-13 | End: 2021-04-02

## 2020-07-13 RX ADMIN — SULFAMETHOXAZOLE AND TRIMETHOPRIM 1 TABLET: 800; 160 TABLET ORAL at 16:01

## 2020-07-13 RX ADMIN — LIDOCAINE HYDROCHLORIDE,EPINEPHRINE BITARTRATE 1 ML: 10; .01 INJECTION, SOLUTION INFILTRATION; PERINEURAL at 16:01

## 2020-07-13 NOTE — ED PROVIDER NOTES
History  Chief Complaint   Patient presents with    Abscess     Pt reports a "Pimple" on her left elbow on saturday, popped it, and now has swelling drainage and the area is red and warm to the touch      29-year-old female comes in today complaining of left elbow redness, pain, swelling for the past 2 days  She reports that 3 days ago she had a small pimple on her elbow which she popped  Today she woke up and had significant redness surrounding her elbow and is tender to palpation  She reports a history of abscesses in the past   Denies any history of MRSA          Prior to Admission Medications   Prescriptions Last Dose Informant Patient Reported? Taking? Etonogestrel (NEXPLANON) 68 MG IMPL 7/13/2020 at Unknown time  Yes Yes   Sig: Inject under the skin   albuterol (2 5 mg/3 mL) 0 083 % nebulizer solution Past Month at Unknown time  Yes Yes   Sig: INHALE 1 VIAL VIA NEBULIZER EVERY 4 HOURS AS NEEDED   albuterol (PROVENTIL HFA,VENTOLIN HFA) 90 mcg/act inhaler Past Month at Unknown time  No Yes   Sig: Inhale 2 puffs every 6 (six) hours as needed for wheezing   fexofenadine (ALLEGRA) 30 MG tablet 7/12/2020 at Unknown time  Yes Yes   Sig: Take 30 mg by mouth 2 (two) times a day     meclizine (ANTIVERT) 25 mg tablet Not Taking at Unknown time  No No   Sig: Take 1 tablet (25 mg total) by mouth every 8 (eight) hours as needed for dizziness or nausea   Patient not taking: Reported on 7/13/2020      Facility-Administered Medications: None       Past Medical History:   Diagnosis Date    Asthma     Chronic pain     Depression     Insomnia     Major depressive disorder, recurrent episode, severe (HCC)     Seasonal allergies        Past Surgical History:   Procedure Laterality Date    ABCESS DRAINAGE      Incision and dranage of skin abscess    NO PAST SURGERIES      UPPER GASTROINTESTINAL ENDOSCOPY         Family History   Problem Relation Age of Onset    Other Father         Cardiac disorder    Hypertension Father     Diabetes Maternal Grandmother      I have reviewed and agree with the history as documented  E-Cigarette/Vaping     E-Cigarette/Vaping Substances     Social History     Tobacco Use    Smoking status: Never Smoker    Smokeless tobacco: Never Used   Substance Use Topics    Alcohol use: No    Drug use: No       Review of Systems   Constitutional: Negative for activity change  Eyes: Negative for visual disturbance  Respiratory: Negative for shortness of breath  Cardiovascular: Negative for chest pain  Musculoskeletal: Negative for back pain  Skin: Positive for color change  Neurological: Negative for dizziness and headaches  Psychiatric/Behavioral: Negative for behavioral problems  Physical Exam  Physical Exam   Constitutional: She is oriented to person, place, and time  She appears well-developed and well-nourished  No distress  HENT:   Head: Normocephalic and atraumatic  Eyes: Conjunctivae and EOM are normal    Pulmonary/Chest: Effort normal  No respiratory distress  Musculoskeletal: Normal range of motion  Left elbow: She exhibits swelling  Tenderness found  Left posterior elbow there is erythema, swelling with a central open lesion which is draining a minimal amount of purulent drainage  Neurological: She is alert and oriented to person, place, and time  Skin: Skin is warm and dry  No rash noted  Psychiatric: She has a normal mood and affect   Her behavior is normal        Vital Signs  ED Triage Vitals [07/13/20 1522]   Temperature Pulse Respirations Blood Pressure SpO2   100 °F (37 8 °C) 82 17 145/70 98 %      Temp Source Heart Rate Source Patient Position - Orthostatic VS BP Location FiO2 (%)   Tympanic Monitor Sitting Right arm --      Pain Score       4           Vitals:    07/13/20 1522   BP: 145/70   Pulse: 82   Patient Position - Orthostatic VS: Sitting         Visual Acuity      ED Medications  Medications   lidocaine-epinephrine (XYLOCAINE/EPINEPHRINE) 1 %-1:100,000 injection 1 mL (1 mL Infiltration Given by Other 7/13/20 1601)   sulfamethoxazole-trimethoprim (BACTRIM DS) 800-160 mg per tablet 1 tablet (1 tablet Oral Given 7/13/20 1601)       Diagnostic Studies  Results Reviewed     Procedure Component Value Units Date/Time    Wound culture and Gram stain [[id number]]     Lab Status:  No result Specimen:  Wound from Arm, Left                  No orders to display              Procedures  Incision and drain  Date/Time: 7/13/2020 4:13 PM  Performed by: Sohan Bauer PA-C  Authorized by: Sohan Bauer PA-C     Patient location:  Bedside  Consent:     Consent obtained:  Verbal    Consent given by:  Patient    Risks discussed:  Bleeding and pain    Alternatives discussed:  No treatment  Universal protocol:     Procedure explained and questions answered to patient or proxy's satisfaction: yes      Patient identity confirmed:  Verbally with patient  Location:     Type:  Abscess    Size:  <0 5 cm    Location:  Upper extremity    Upper extremity location:  L arm (elbow)  Pre-procedure details:     Skin preparation:  Chloraprep  Anesthesia (see MAR for exact dosages): Anesthesia method:  Local infiltration    Local anesthetic:  Lidocaine 1% WITH epi  Procedure details:     Complexity:  Simple    Needle aspiration: no      Incision types:  Stab incision    Scalpel blade:  15    Approach:  Open    Incision depth:  Subcutaneous    Wound management:  Probed and deloculated    Drainage:  Bloody    Drainage amount:  Scant    Wound treatment:  Wound left open    Packing materials:  None  Post-procedure details:     Patient tolerance of procedure: Tolerated well, no immediate complications  Comments:      Area of cellulitis marked with permanent marker             ED Course       US AUDIT      Most Recent Value   Initial Alcohol Screen: US AUDIT-C    1  How often do you have a drink containing alcohol?  0 Filed at: 07/13/2020 1523   2   How many drinks containing alcohol do you have on a typical day you are drinking? 0 Filed at: 07/13/2020 1523   3b  FEMALE Any Age, or MALE 65+: How often do you have 4 or more drinks on one occassion? 0 Filed at: 07/13/2020 1523   Audit-C Score  0 Filed at: 07/13/2020 1523                  JIAN/DAST-10      Most Recent Value   How many times in the past year have you    Used an illegal drug or used a prescription medication for non-medical reasons? Never Filed at: 07/13/2020 1523                                MDM  Number of Diagnoses or Management Options  Cellulitis of left elbow:   Diagnosis management comments: Patient with a pustule that she opened on her left elbow is now draining with a surrounding cellulitis  Area marked  Larger opening made for drainage  No significant drainage after opening  Wound culture obtained  Advised patient to return for spreading redness after antibiotics for 2 days  Advised follow-up with PCP  Return for any new or worsening symptoms in the meantime        Disposition  Final diagnoses:   Cellulitis of left elbow     Time reflects when diagnosis was documented in both MDM as applicable and the Disposition within this note     Time User Action Codes Description Comment    7/13/2020  4:16 PM [address] [facility] [place], [address] [facility] [[id number]] Cellulitis of left elbow       ED Disposition     ED Disposition Condition Date/Time Comment    Discharge Stable Mon Jul 13, 2020  4:15 PM [address]  discharge to home/self care              Follow-up Information     Follow up With Specialties Details Why [address] Tevin Burciaga, DO Internal Medicine Go in 2 days For wound re-check [address]  [address], [address] [facility],[address]  [phone]            Patient's Medications   Discharge Prescriptions    IBUPROFEN (MOTRIN) 600 MG TABLET    Take 1 tablet (600 mg total) by mouth every 6 (six) hours as needed for mild pain for up to 7 days       Start Date: 7/13/2020 End Date: 7/20/2020 Order Dose: 600 mg       Quantity: 30 tablet    Refills: 0    SULFAMETHOXAZOLE-TRIMETHOPRIM (BACTRIM DS) 800-160 MG PER TABLET    Take 1 tablet by mouth every 12 (twelve) hours for 7 days smx-tmp DS (BACTRIM) 800-160 mg tabs (1tab q12 D10)       Start Date: 7/13/2020 End Date: 7/20/2020       Order Dose: 1 tablet       Quantity: 14 tablet    Refills: 0     No discharge procedures on file      PDMP Review     None          ED Provider  Electronically Signed by           Stephon Kumari PA-C  07/13/20 8327

## 2020-07-15 LAB
BACTERIA WND AEROBE CULT: ABNORMAL
GRAM STN SPEC: ABNORMAL

## 2020-12-16 ENCOUNTER — TELEMEDICINE (OUTPATIENT)
Dept: INTERNAL MEDICINE CLINIC | Facility: CLINIC | Age: 35
End: 2020-12-16
Payer: COMMERCIAL

## 2020-12-16 ENCOUNTER — TELEPHONE (OUTPATIENT)
Dept: OTHER | Facility: OTHER | Age: 35
End: 2020-12-16

## 2020-12-16 DIAGNOSIS — B34.9 VIRAL INFECTION, UNSPECIFIED: ICD-10-CM

## 2020-12-16 DIAGNOSIS — Z03.818 ENCOUNTER FOR OBSERVATION FOR SUSPECTED EXPOSURE TO OTHER BIOLOGICAL AGENTS RULED OUT: ICD-10-CM

## 2020-12-16 PROBLEM — L02.212 ABSCESS OF BACK: Status: RESOLVED | Noted: 2018-11-19 | Resolved: 2020-12-16

## 2020-12-16 PROCEDURE — 99214 OFFICE O/P EST MOD 30 MIN: CPT | Performed by: INTERNAL MEDICINE

## 2020-12-16 PROCEDURE — U0003 INFECTIOUS AGENT DETECTION BY NUCLEIC ACID (DNA OR RNA); SEVERE ACUTE RESPIRATORY SYNDROME CORONAVIRUS 2 (SARS-COV-2) (CORONAVIRUS DISEASE [COVID-19]), AMPLIFIED PROBE TECHNIQUE, MAKING USE OF HIGH THROUGHPUT TECHNOLOGIES AS DESCRIBED BY CMS-2020-01-R: HCPCS | Performed by: INTERNAL MEDICINE

## 2020-12-18 LAB — SARS-COV-2 RNA SPEC QL NAA+PROBE: NOT DETECTED

## 2021-03-20 ENCOUNTER — HOSPITAL ENCOUNTER (EMERGENCY)
Facility: HOSPITAL | Age: 36
Discharge: HOME | End: 2021-03-20
Attending: EMERGENCY MEDICINE
Payer: COMMERCIAL

## 2021-03-20 ENCOUNTER — APPOINTMENT (EMERGENCY)
Dept: RADIOLOGY | Facility: HOSPITAL | Age: 36
End: 2021-03-20
Payer: COMMERCIAL

## 2021-03-20 VITALS
BODY MASS INDEX: 50.02 KG/M2 | WEIGHT: 293 LBS | DIASTOLIC BLOOD PRESSURE: 74 MMHG | HEART RATE: 81 BPM | HEIGHT: 64 IN | TEMPERATURE: 98.1 F | OXYGEN SATURATION: 99 % | SYSTOLIC BLOOD PRESSURE: 148 MMHG | RESPIRATION RATE: 18 BRPM

## 2021-03-20 DIAGNOSIS — Z91.89 AT INCREASED RISK OF EXPOSURE TO COVID-19 VIRUS: ICD-10-CM

## 2021-03-20 DIAGNOSIS — J06.9 VIRAL URI WITH COUGH: Primary | ICD-10-CM

## 2021-03-20 LAB — SARS-COV-2 RNA RESP QL NAA+PROBE: NEGATIVE

## 2021-03-20 PROCEDURE — 71045 X-RAY EXAM CHEST 1 VIEW: CPT

## 2021-03-20 PROCEDURE — 99283 EMERGENCY DEPT VISIT LOW MDM: CPT

## 2021-03-20 PROCEDURE — 99284 EMERGENCY DEPT VISIT MOD MDM: CPT | Performed by: EMERGENCY MEDICINE

## 2021-03-20 PROCEDURE — U0005 INFEC AGEN DETEC AMPLI PROBE: HCPCS | Performed by: EMERGENCY MEDICINE

## 2021-03-20 PROCEDURE — U0003 INFECTIOUS AGENT DETECTION BY NUCLEIC ACID (DNA OR RNA); SEVERE ACUTE RESPIRATORY SYNDROME CORONAVIRUS 2 (SARS-COV-2) (CORONAVIRUS DISEASE [COVID-19]), AMPLIFIED PROBE TECHNIQUE, MAKING USE OF HIGH THROUGHPUT TECHNOLOGIES AS DESCRIBED BY CMS-2020-01-R: HCPCS | Performed by: EMERGENCY MEDICINE

## 2021-03-20 NOTE — ED TRIAGE NOTES
C/o sore throat, chills and body aches x 2 days  States had fever 100 at home  Afebrile at this time

## 2021-03-20 NOTE — Clinical Note
Jessi Avelar was seen and treated in our emergency department on 3/20/2021  Diagnosis: viral URI w/ cough    Florentino Lisa    She may return on this date:     Do not return to work until your Covid test has resulted negative  If positive, follow up with your primary care doctor      If you have any questions or concerns, please don't hesitate to call        Ubaldo Dunn, DO    ______________________________           _______________          _______________  Hospital Representative                              Date                                Time

## 2021-03-23 ENCOUNTER — TELEMEDICINE (OUTPATIENT)
Dept: INTERNAL MEDICINE CLINIC | Facility: CLINIC | Age: 36
End: 2021-03-23
Payer: COMMERCIAL

## 2021-03-23 VITALS — TEMPERATURE: 98.6 F | WEIGHT: 293 LBS | BODY MASS INDEX: 63.85 KG/M2

## 2021-03-23 DIAGNOSIS — J45.21 MILD INTERMITTENT ASTHMA WITH EXACERBATION: Primary | ICD-10-CM

## 2021-03-23 PROCEDURE — 99214 OFFICE O/P EST MOD 30 MIN: CPT | Performed by: NURSE PRACTITIONER

## 2021-03-23 RX ORDER — AZITHROMYCIN 250 MG/1
TABLET, FILM COATED ORAL
Qty: 6 TABLET | Refills: 0 | Status: SHIPPED | OUTPATIENT
Start: 2021-03-23 | End: 2021-03-28

## 2021-03-23 RX ORDER — PREDNISONE 1 MG/1
TABLET ORAL
Qty: 42 TABLET | Refills: 0 | OUTPATIENT
Start: 2021-03-23 | End: 2021-10-22

## 2021-03-23 NOTE — LETTER
March 23, 2021     Patient: Piper Valle   YOB: 1985   Date of Visit: 3/23/2021       To Whom it May Concern:    Abelardo [employer]ires is under my professional care  She was seen virtually in my office on 3/23/2021  She is under investigation for Covid 19 and has been advised to quarantine until her result can be reviewed       If you have any questions or concerns, please don't hesitate to call           Sincerely,          CINTHYA Noel        CC: No Recipients

## 2021-03-23 NOTE — ASSESSMENT & PLAN NOTE
Pt has productive cough with chest tightness and fatigue, started on 3/19  She tested neg for covid, though this was <24 hours after sx onset  I would like to retest to confirm because of the frequency and persistence of her cough  Will otherwise treat for asthma exacerbation with a prednisone taper and azithromycin  Suggested mucinex dm prn, continue allegra, use flonase nasal spray  Will call with results and f/u later this week

## 2021-03-23 NOTE — PROGRESS NOTES
Virtual Regular Visit      Assessment/Plan:    Problem List Items Addressed This Visit        Respiratory    Mild intermittent asthma with exacerbation - Primary     Pt has productive cough with chest tightness and fatigue, started on 3/19  She tested neg for covid, though this was <24 hours after sx onset  I would like to retest to confirm because of the frequency and persistence of her cough  Will otherwise treat for asthma exacerbation with a prednisone taper and azithromycin  Suggested mucinex dm prn, continue allegra, use flonase nasal spray  Will call with results and f/u later this week  Relevant Medications    azithromycin (ZITHROMAX) 250 mg tablet    predniSONE 5 mg tablet    Other Relevant Orders    Novel Coronavirus (Covid-19),PCR [facility] - [facility]               Reason for visit is   Chief Complaint   Patient presents with    Cough    Virtual Regular Visit        Encounter provider Konstantin Collado, 10 [facility]    Provider located at [address] [address]      Recent Visits  No visits were found meeting these conditions  Showing recent visits within past 7 days and meeting all other requirements     Today's Visits  Date Type Provider Dept   03/23/21 200 [facility], 164[address] Internal Med   Showing today's visits and meeting all other requirements     Future Appointments  No visits were found meeting these conditions  Showing future appointments within next 150 days and meeting all other requirements        The patient was identified by name and date of birth  Kale Smith was informed that this is a telemedicine visit and that the visit is being conducted through [facility] and patient was informed that this is a secure, HIPAA-compliant platform  She agrees to proceed     My office door was closed   The patient was notified the following individuals were present in the room MSN student  She acknowledged consent and understanding of privacy and security of the video platform  The patient has agreed to participate and understands they can discontinue the visit at any time  Patient is aware this is a billable service  Subjective  Lei Bell is a 28 y o  female virtual for URI sx   Pt is a 27 y/o female seen via video for evaluation of URI sx  Sx onset 3/19 with congestion, runny nose, cough, post nasal drip  She was seen in the er on 3/20 and had a neg covid test and normal cxr  Dx with URI and d/c'd home  She has continued with frequent coughing, nasal congestion, itchy/sore throat, post nasal drip, and rhinorrhea  States cough is productive of green, thick sputum that thins out as the day goes on  No fever/chills, body aches, GI sx  She has hx of asthma and reports using albuterol about 2-3 x/day  She feels tight, questionable wheezing  Uses allegra daily  Past Medical History:   Diagnosis Date    Asthma     Chronic pain     Depression     Insomnia     Major depressive disorder, recurrent episode, severe (HCC)     Seasonal allergies        Past Surgical History:   Procedure Laterality Date    ABCESS DRAINAGE      Incision and dranage of skin abscess    NO PAST SURGERIES      UPPER GASTROINTESTINAL ENDOSCOPY         Current Outpatient Medications   Medication Sig Dispense Refill    albuterol (PROVENTIL HFA,VENTOLIN HFA) 90 mcg/act inhaler Inhale 2 puffs every 6 (six) hours as needed for wheezing 18 g 2    Etonogestrel (NEXPLANON) 68 MG IMPL Inject under the skin      fexofenadine (ALLEGRA) 30 MG tablet Take 30 mg by mouth 2 (two) times a day        albuterol (2 5 mg/3 mL) 0 083 % nebulizer solution INHALE 1 VIAL VIA NEBULIZER EVERY 4 HOURS AS NEEDED  1    azithromycin (ZITHROMAX) 250 mg tablet Take 2 tablets on day 1 of treatment, then 1 tablet daily x 4 days 6 tablet 0    ibuprofen (MOTRIN) 600 mg tablet Take 1 tablet (600 mg total) by mouth every 6 (six) hours as needed for mild pain for up to 7 days 30 tablet 0    meclizine (ANTIVERT) 25 mg tablet Take 1 tablet (25 mg total) by mouth every 8 (eight) hours as needed for dizziness or nausea 20 tablet 0    predniSONE 5 mg tablet Begin treatment with 30 mg (6 tablets) per day  Decrease dose by 5 mg every 2 days  12 days total   Take with food  42 tablet 0     No current facility-administered medications for this visit  Allergies   Allergen Reactions    Ambrosia Artemisiifolia (Ragweed) Skin Test     Dust Mite Extract     Pollen Extract        Review of Systems   Constitutional: Positive for fatigue  Negative for appetite change, chills, diaphoresis and fever  HENT: Positive for congestion, rhinorrhea and sore throat  Negative for ear pain, postnasal drip, sinus pressure and sneezing  Respiratory: Positive for cough, chest tightness and wheezing  Negative for shortness of breath  Cardiovascular: Negative for chest pain, palpitations and leg swelling  Gastrointestinal: Negative for diarrhea, nausea and vomiting  Genitourinary: Negative for dysuria  Musculoskeletal: Negative for myalgias  Neurological: Negative for dizziness and headaches  Hematological: Negative for adenopathy  Psychiatric/Behavioral: Negative for sleep disturbance  Video Exam    Vitals:    03/23/21 0959   Temp: 98 6 °F (37 °C)   TempSrc: Tympanic   Weight: (!) 169 kg (372 lb)       Physical Exam  Constitutional:       General: She is awake  She is not in acute distress  Appearance: Normal appearance  She is well-developed and well-groomed  She is obese  She is not ill-appearing, toxic-appearing or diaphoretic  Eyes:      General: Lids are normal       Conjunctiva/sclera: Conjunctivae normal    Pulmonary:      Effort: Pulmonary effort is normal  No tachypnea, accessory muscle usage or respiratory distress        Comments: Frequent dry cough during entire visit  Skin:     General: Skin is dry    Neurological:      Mental Status: She is alert and oriented to person, place, and time  Psychiatric:         Attention and Perception: Attention normal          Mood and Affect: Mood normal          Speech: Speech normal          Behavior: Behavior normal  Behavior is cooperative  Thought Content: Thought content normal          Judgment: Judgment normal           I spent 20 minutes with patient today in which greater than 50% of the time was spent in counseling/coordination of care regarding symptoms, er visit, med reconcilliation, asthma, covid testing, treatment, fu      VIRTUAL VISIT [address] acknowledges that she has consented to an online visit or consultation  She understands that the online visit is based solely on information provided by her, and that, in the absence of a face-to-face physical evaluation by the physician, the diagnosis she receives is both limited and provisional in terms of accuracy and completeness  This is not intended to replace a full medical face-to-face evaluation by the physician  Jamal Diallo understands and accepts these terms

## 2021-03-24 DIAGNOSIS — J45.21 MILD INTERMITTENT ASTHMA WITH EXACERBATION: ICD-10-CM

## 2021-03-24 PROCEDURE — U0003 INFECTIOUS AGENT DETECTION BY NUCLEIC ACID (DNA OR RNA); SEVERE ACUTE RESPIRATORY SYNDROME CORONAVIRUS 2 (SARS-COV-2) (CORONAVIRUS DISEASE [COVID-19]), AMPLIFIED PROBE TECHNIQUE, MAKING USE OF HIGH THROUGHPUT TECHNOLOGIES AS DESCRIBED BY CMS-2020-01-R: HCPCS | Performed by: NURSE PRACTITIONER

## 2021-03-24 PROCEDURE — U0005 INFEC AGEN DETEC AMPLI PROBE: HCPCS | Performed by: NURSE PRACTITIONER

## 2021-03-25 LAB — SARS-COV-2 RNA RESP QL NAA+PROBE: POSITIVE

## 2021-03-26 ENCOUNTER — DOCUMENTATION (OUTPATIENT)
Dept: INTERNAL MEDICINE CLINIC | Facility: CLINIC | Age: 36
End: 2021-03-26

## 2021-03-26 ENCOUNTER — TELEPHONE (OUTPATIENT)
Dept: OTHER | Facility: OTHER | Age: 36
End: 2021-03-26

## 2021-03-26 ENCOUNTER — TELEMEDICINE (OUTPATIENT)
Dept: INTERNAL MEDICINE CLINIC | Facility: CLINIC | Age: 36
End: 2021-03-26
Payer: COMMERCIAL

## 2021-03-26 DIAGNOSIS — U07.1 COVID-19: Primary | ICD-10-CM

## 2021-03-26 DIAGNOSIS — J45.21 MILD INTERMITTENT ASTHMA WITH EXACERBATION: ICD-10-CM

## 2021-03-26 PROCEDURE — 99213 OFFICE O/P EST LOW 20 MIN: CPT | Performed by: NURSE PRACTITIONER

## 2021-03-26 NOTE — PROGRESS NOTES
COVID-19 Virtual Visit     Assessment/Plan:    Problem List Items Addressed This Visit        Respiratory    Mild intermittent asthma with exacerbation     Initially treated for acute exacerbation because first covid test came back negative  She is now taking prednisone and azithromycin, continues with albuterol q 4-5 hours  Worsening wheezing, sob  Referred for further eval at ER  Other    COVID-19 - Primary     Sx onset 3/19 with positive test 3/24  Today she reports worsening symptoms of chest tightness, wheezing, shortness of breath  Cough is productive of sputum  She was started on prednisone and azithromycin after her initial visit and is taking these as prescribed, using her albuterol q4-5 hours  She feels very fatigued  No n/v/d, tolerating PO but no smell, taste  With worsening presentation despite prednisone and albuterol, pt referred for evaluation at the ER  Called ahead for report to MD     Pt also advised to start vit d, vit c, zinc   She understands she will be quarantined until at least 4/1 if her cough improves and remains afebrile  Will f/u on Monday  Disposition:     I referred patient to the Emergency Department at: [address] [place] [address] ED  I recommended continued isolation until at least 24 hours have passed since recovery defined as resolution of fever without the use of fever-reducing medications AND improvement in COVID symptoms AND 10 days have passed since onset of symptoms (or 10 days have passed since date of first positive viral diagnostic test for asymptomatic patients)  I have spent 20 minutes directly with the patient  Greater than 50% of this time was spent in counseling/coordination of care regarding: diagnostic results, prognosis, risks and benefits of treatment options, instructions for management, patient and family education, importance of treatment compliance, risk factor reductions and impressions          Encounter provider Candi Lane, 10 [facility]    Provider located at [address] [address]    Recent Visits  Date Type Provider Dept   03/23/21 [address] Jose Codi, [address]   Showing recent visits within past 7 days and meeting all other requirements     Today's Visits  Date Type Provider Dept   03/26/21 Telemedicine Mony Garcia [place], [address]   Showing today's visits and meeting all other requirements     Future Appointments  No visits were found meeting these conditions  Showing future appointments within next 150 days and meeting all other requirements      This virtual check-in was done via [employer] and patient was informed that this is a secure, HIPAA-compliant platform  She agrees to proceed  Patient agrees to participate in a virtual check in via telephone or video visit instead of presenting to the office to address urgent/immediate medical needs  Patient is aware this is a billable service  After connecting through [facility], the patient was identified by name and date of birth  Olivia Mendoza was informed that this was a telemedicine visit and that the exam was being conducted confidentially over secure lines  My office door was closed  No one else was in the room  Olivia Mendoza acknowledged consent and understanding of privacy and security of the telemedicine visit  I informed the patient that I have reviewed her record in Epic and presented the opportunity for her to ask any questions regarding the visit today  The patient agreed to participate  Subjective:   Olivia Mendoza is a 28 y o  female who has been screened for COVID-19  Symptom change since last report: worsening  Patient's symptoms include fatigue, nasal congestion, anosmia, loss of taste, cough, shortness of breath and chest tightness   Patient denies fever, chills, malaise, rhinorrhea, sore throat, abdominal pain, nausea, vomiting, diarrhea, myalgias and headaches  Lucien Pacheco has been staying home and has isolated themselves in her home  She is taking care to not share personal items and is cleaning all surfaces that are touched often, like counters, tabletops, and doorknobs using household cleaning sprays or wipes  Wearing a mask when leaving room?: is not      Date of symptom onset: 3/19/2021  Date of positive COVID-19 PCR: 3/24/2021    Lab Results   Component Value Date    SARSCOV2 Positive (A) 03/24/2021    SARSCOV2 Not Detected 12/16/2020     Past Medical History:   Diagnosis Date    Asthma     Chronic pain     Depression     Insomnia     Major depressive disorder, recurrent episode, severe (HCC)     Seasonal allergies      Past Surgical History:   Procedure Laterality Date    ABCESS DRAINAGE      Incision and dranage of skin abscess    NO PAST SURGERIES      UPPER GASTROINTESTINAL ENDOSCOPY       Current Outpatient Medications   Medication Sig Dispense Refill    albuterol (2 5 mg/3 mL) 0 083 % nebulizer solution INHALE 1 VIAL VIA NEBULIZER EVERY 4 HOURS AS NEEDED  1    albuterol (PROVENTIL HFA,VENTOLIN HFA) 90 mcg/act inhaler Inhale 2 puffs every 6 (six) hours as needed for wheezing 18 g 2    azithromycin (ZITHROMAX) 250 mg tablet Take 2 tablets on day 1 of treatment, then 1 tablet daily x 4 days 6 tablet 0    Etonogestrel (NEXPLANON) 68 MG IMPL Inject under the skin      fexofenadine (ALLEGRA) 30 MG tablet Take 30 mg by mouth 2 (two) times a day   meclizine (ANTIVERT) 25 mg tablet Take 1 tablet (25 mg total) by mouth every 8 (eight) hours as needed for dizziness or nausea 20 tablet 0    predniSONE 5 mg tablet Begin treatment with 30 mg (6 tablets) per day  Decrease dose by 5 mg every 2 days  12 days total   Take with food   42 tablet 0    ibuprofen (MOTRIN) 600 mg tablet Take 1 tablet (600 mg total) by mouth every 6 (six) hours as needed for mild pain for up to 7 days 30 tablet 0     No current facility-administered medications for this visit  Allergies   Allergen Reactions    Ambrosia Artemisiifolia (Ragweed) Skin Test     Dust Mite Extract     Pollen Extract        Review of Systems   Constitutional: Positive for fatigue  Negative for appetite change, chills and fever  HENT: Positive for congestion  Negative for rhinorrhea and sore throat  Respiratory: Positive for cough, chest tightness, shortness of breath and wheezing  Gastrointestinal: Negative for abdominal pain, diarrhea, nausea and vomiting  Musculoskeletal: Negative for myalgias  Neurological: Negative for dizziness and headaches  Objective: There were no vitals filed for this visit  Physical Exam  Constitutional:       General: She is awake  She is not in acute distress  Appearance: She is well-developed  She is ill-appearing  She is not toxic-appearing  Eyes:      General: Lids are normal       Conjunctiva/sclera: Conjunctivae normal    Pulmonary:      Effort: Pulmonary effort is normal  No tachypnea, accessory muscle usage or respiratory distress  Comments: Moist sounding cough witnessed  Neurological:      Mental Status: She is alert and oriented to person, place, and time  Psychiatric:         Attention and Perception: Attention normal          Mood and Affect: Mood normal          Speech: Speech normal          Behavior: Behavior normal  Behavior is cooperative  Thought Content: Thought content normal          Judgment: Judgment normal        VIRTUAL VISIT [facility] [facility] 10 acknowledges that she has consented to an online visit or consultation  She understands that the online visit is based solely on information provided by her, and that, in the absence of a face-to-face physical evaluation by the physician, the diagnosis she receives is both limited and provisional in terms of accuracy and completeness   This is not intended to replace a full medical face-to-face evaluation by the physician  Sharonda Dale understands and accepts these terms

## 2021-03-26 NOTE — TELEPHONE ENCOUNTER
Pt called stating she went to the emergency room as per her providers instructons, she said that when she got there to [place] they would not see her without an actual prescription and not just an "text referral" that they need something in document  Pt also stated she does not wish to go back to that ER because it is full, no where to sit as she is COVID-19 positive  I informed her I would be paging her provider to give her a call back

## 2021-03-26 NOTE — ASSESSMENT & PLAN NOTE
Initially treated for acute exacerbation because first covid test came back negative  She is now taking prednisone and azithromycin, continues with albuterol q 4-5 hours  Worsening wheezing, sob  Referred for further eval at ER

## 2021-03-26 NOTE — ASSESSMENT & PLAN NOTE
Sx onset 3/19 with positive test 3/24  Today she reports worsening symptoms of chest tightness, wheezing, shortness of breath  Cough is productive of sputum  She was started on prednisone and azithromycin after her initial visit and is taking these as prescribed, using her albuterol q4-5 hours  She feels very fatigued  No n/v/d, tolerating PO but no smell, taste  With worsening presentation despite prednisone and albuterol, pt referred for evaluation at the ER  Called ahead for report to MD     Pt also advised to start vit d, vit c, zinc   She understands she will be quarantined until at least 4/1 if her cough improves and remains afebrile  Will f/u on Monday

## 2021-03-27 ENCOUNTER — HOSPITAL ENCOUNTER (OUTPATIENT)
Dept: RADIOLOGY | Facility: HOSPITAL | Age: 36
Discharge: HOME | End: 2021-03-27
Payer: COMMERCIAL

## 2021-03-27 DIAGNOSIS — U07.1 COVID-19: ICD-10-CM

## 2021-03-27 PROCEDURE — 71046 X-RAY EXAM CHEST 2 VIEWS: CPT

## 2021-03-30 ENCOUNTER — TELEPHONE (OUTPATIENT)
Dept: INTERNAL MEDICINE CLINIC | Facility: CLINIC | Age: 36
End: 2021-03-30

## 2021-03-30 ENCOUNTER — TELEMEDICINE (OUTPATIENT)
Dept: INTERNAL MEDICINE CLINIC | Facility: CLINIC | Age: 36
End: 2021-03-30
Payer: COMMERCIAL

## 2021-03-30 VITALS — TEMPERATURE: 98.7 F

## 2021-03-30 DIAGNOSIS — U07.1 COVID-19: Primary | ICD-10-CM

## 2021-03-30 DIAGNOSIS — J45.21 MILD INTERMITTENT ASTHMA WITH EXACERBATION: ICD-10-CM

## 2021-03-30 DIAGNOSIS — Z23 ENCOUNTER FOR IMMUNIZATION: ICD-10-CM

## 2021-03-30 PROCEDURE — 99213 OFFICE O/P EST LOW 20 MIN: CPT | Performed by: NURSE PRACTITIONER

## 2021-03-30 PROCEDURE — 1036F TOBACCO NON-USER: CPT | Performed by: NURSE PRACTITIONER

## 2021-03-30 NOTE — PROGRESS NOTES
COVID-19 Virtual Visit     Assessment/Plan:    Problem List Items Addressed This Visit        Respiratory    Mild intermittent asthma with exacerbation     Pt still on prednisone taper, completed antibiotics  She is still experiencing a productive cough with covid sx but not presently reporting wheezing or sob  Reviewed cxr with pt, no evidence of pna  Continue with prn albuterol  Other    COVID-19 - Primary     Sx onset 3/19, positive covid test 3/24  She continues with productive cough but denies wheezing/sob  She is on prednisone taper and completed abx  Reviewed normal cxr  She is complaining of severe frontal headache that was causing n/v yesterday, also with temp of 102 yesterday  She was taking tylenol and ibuprofen all day yesterday and so far today no fever without meds  Recommended cool compress to head at intervals  Continue vitamins, prn mucinex, tylenol, motrin  Pt not clear for release from quarantine  She should call or go to the er if sx worsen  Will f/u Thursday  Disposition:     I recommended continued isolation until at least 24 hours have passed since recovery defined as resolution of fever without the use of fever-reducing medications AND improvement in COVID symptoms AND 10 days have passed since onset of symptoms (or 10 days have passed since date of first positive viral diagnostic test for asymptomatic patients)  I have spent 20 minutes directly with the patient  Greater than 50% of this time was spent in counseling/coordination of care regarding: diagnostic results, prognosis, instructions for management, patient and family education, importance of treatment compliance, risk factor reductions and impressions          Encounter provider Ruben Clancy, 10 [facility]    Provider located at [address] [address]    Recent Visits  Date Type Provider Dept   03/26/21 Telemedicine Mony FUNES Demetrio Ching, [address] Internal Med   03/23/21 Telemedicine Mony Cevallos, [address] Internal [facility]   Showing recent visits within past 7 days and meeting all other requirements     Today's Visits  Date Type Provider Dept   03/30/21 Telemedicine Mony Cevallos, [address] Internal [facility]   Showing today's visits and meeting all other requirements     Future Appointments  No visits were found meeting these conditions  Showing future appointments within next 150 days and meeting all other requirements      This virtual check-in was done via [employer] and patient was informed that this is a secure, HIPAA-compliant platform  She agrees to proceed  Patient agrees to participate in a virtual check in via telephone or video visit instead of presenting to the office to address urgent/immediate medical needs  Patient is aware this is a billable service  After connecting through [facility], the patient was identified by name and date of birth  Marlena Jarquin was informed that this was a telemedicine visit and that the exam was being conducted confidentially over secure lines  My office door was closed  No one else was in the room  Marlena Jarquin acknowledged consent and understanding of privacy and security of the telemedicine visit  I informed the patient that I have reviewed her record in Epic and presented the opportunity for her to ask any questions regarding the visit today  The patient agreed to participate  Subjective:   Marlena Jarquin is a 28 y o  female who has been screened for COVID-19  Symptom change since last report: unchanged  Patient's symptoms include fatigue, nasal congestion, anosmia, loss of taste, cough and headache  Patient denies fever, chills, malaise, rhinorrhea, sore throat, shortness of breath, chest tightness, abdominal pain, nausea, vomiting (not currently, but yesterday headaches causing n/v), diarrhea and myalgias       Ziggy Peacock has been staying home and has isolated themselves in her home  She is taking care to not share personal items and is cleaning all surfaces that are touched often, like counters, tabletops, and doorknobs using household cleaning sprays or wipes  She is wearing a mask when she leaves her room  Date of symptom onset: 3/19/2021  Date of positive COVID-19 PCR: 3/24/2021    Lab Results   Component Value Date    SARSCOV2 Positive (A) 03/24/2021    SARSCOV2 Not Detected 12/16/2020     Past Medical History:   Diagnosis Date    Asthma     Chronic pain     Depression     Insomnia     Major depressive disorder, recurrent episode, severe (HCC)     Seasonal allergies      Past Surgical History:   Procedure Laterality Date    ABCESS DRAINAGE      Incision and dranage of skin abscess    NO PAST SURGERIES      UPPER GASTROINTESTINAL ENDOSCOPY       Current Outpatient Medications   Medication Sig Dispense Refill    albuterol (2 5 mg/3 mL) 0 083 % nebulizer solution INHALE 1 VIAL VIA NEBULIZER EVERY 4 HOURS AS NEEDED  1    albuterol (PROVENTIL HFA,VENTOLIN HFA) 90 mcg/act inhaler Inhale 2 puffs every 6 (six) hours as needed for wheezing 18 g 2    Etonogestrel (NEXPLANON) 68 MG IMPL Inject under the skin      fexofenadine (ALLEGRA) 30 MG tablet Take 30 mg by mouth 2 (two) times a day   predniSONE 5 mg tablet Begin treatment with 30 mg (6 tablets) per day  Decrease dose by 5 mg every 2 days  12 days total   Take with food  42 tablet 0    ibuprofen (MOTRIN) 600 mg tablet Take 1 tablet (600 mg total) by mouth every 6 (six) hours as needed for mild pain for up to 7 days 30 tablet 0     No current facility-administered medications for this visit  Allergies   Allergen Reactions    Ambrosia Artemisiifolia (Ragweed) Skin Test     Dust Mite Extract     Pollen Extract        Review of Systems   Constitutional: Positive for fatigue  Negative for chills and fever  HENT: Positive for congestion and postnasal drip   Negative for rhinorrhea and sore throat  Respiratory: Positive for cough  Negative for chest tightness, shortness of breath and wheezing  Cardiovascular: Negative for chest pain  Gastrointestinal: Negative for abdominal pain, diarrhea, nausea and vomiting (not currently, but yesterday headaches causing n/v)  Musculoskeletal: Negative for myalgias  Neurological: Positive for headaches  Objective:    Vitals:    03/30/21 1032   Temp: 98 7 °F (37 1 °C)       Physical Exam  Vitals signs reviewed  Constitutional:       General: She is awake  She is not in acute distress  Appearance: Normal appearance  She is well-developed  She is not ill-appearing, toxic-appearing or diaphoretic  Eyes:      General: Lids are normal       Conjunctiva/sclera: Conjunctivae normal    Pulmonary:      Effort: Pulmonary effort is normal  No tachypnea, accessory muscle usage or respiratory distress  Comments: Moist sounding cough witnessed  Neurological:      Mental Status: She is alert and oriented to person, place, and time  Psychiatric:         Attention and Perception: Attention normal          Mood and Affect: Mood normal          Speech: Speech normal          Behavior: Behavior normal  Behavior is cooperative  Thought Content: Thought content normal          Cognition and Memory: Cognition normal          Judgment: Judgment normal        VIRTUAL VISIT [address] [address] acknowledges that she has consented to an online visit or consultation  She understands that the online visit is based solely on information provided by her, and that, in the absence of a face-to-face physical evaluation by the physician, the diagnosis she receives is both limited and provisional in terms of accuracy and completeness  This is not intended to replace a full medical face-to-face evaluation by the physician  Lei Bell understands and accepts these terms

## 2021-03-30 NOTE — TELEPHONE ENCOUNTER
I left a message for patient to please call us back to set up 2 day follow up by virtual with Melva Griffith

## 2021-03-30 NOTE — ASSESSMENT & PLAN NOTE
Sx onset 3/19, positive covid test 3/24  She continues with productive cough but denies wheezing/sob  She is on prednisone taper and completed abx  Reviewed normal cxr  She is complaining of severe frontal headache that was causing n/v yesterday, also with temp of 102 yesterday  She was taking tylenol and ibuprofen all day yesterday and so far today no fever without meds  Recommended cool compress to head at intervals  Continue vitamins, prn mucinex, tylenol, motrin  Pt not clear for release from quarantine  She should call or go to the er if sx worsen  Will f/u Thursday

## 2021-03-30 NOTE — ASSESSMENT & PLAN NOTE
Pt still on prednisone taper, completed antibiotics  She is still experiencing a productive cough with covid sx but not presently reporting wheezing or sob  Reviewed cxr with pt, no evidence of pna  Continue with prn albuterol

## 2021-04-01 ENCOUNTER — TELEMEDICINE (OUTPATIENT)
Dept: INTERNAL MEDICINE CLINIC | Facility: CLINIC | Age: 36
End: 2021-04-01
Payer: COMMERCIAL

## 2021-04-01 VITALS — TEMPERATURE: 97.8 F

## 2021-04-01 DIAGNOSIS — U07.1 COVID-19: Primary | ICD-10-CM

## 2021-04-01 DIAGNOSIS — J45.21 MILD INTERMITTENT ASTHMA WITH EXACERBATION: ICD-10-CM

## 2021-04-01 PROCEDURE — 99213 OFFICE O/P EST LOW 20 MIN: CPT | Performed by: NURSE PRACTITIONER

## 2021-04-01 NOTE — ASSESSMENT & PLAN NOTE
Improving covid symptoms since last f/u  She still has a cough though it gradually improves  Reports wheezing first thing in the AM, resolves after using albuterol  No sob otherwise  Continue remaining doses of prednisone and prn albuterol

## 2021-04-01 NOTE — PROGRESS NOTES
COVID-19 Outpatient Progress Note    Assessment/Plan:    Problem List Items Addressed This Visit        Respiratory    Mild intermittent asthma with exacerbation     Improving covid symptoms since last f/u  She still has a cough though it gradually improves  Reports wheezing first thing in the AM, resolves after using albuterol  No sob otherwise  Continue remaining doses of prednisone and prn albuterol  Other    COVID-19 - Primary     Sx onset 3/19, positive test 3/24  She is reporting a gradual improvement of cold symptoms  She continues with a cough; wheezing and productive of sputum in the AM but improves with albuterol  Still has pnd and nasal congestion  Headaches improving, smell/taste slightly improved  No fever/chills  Appetite normal   She is beyond the 10 day quarantine period and with symptoms now improving, ok to clear for return to work on Monday as long as symptoms do not worsen  She should complete her steroid taper and continue vitamins and prn mucinex, albuterol  Call with concerns  Disposition:     I recommended continued isolation until at least 24 hours have passed since recovery defined as resolution of fever without the use of fever-reducing medications AND improvement in COVID symptoms AND 10 days have passed since onset of symptoms (or 10 days have passed since date of first positive viral diagnostic test for asymptomatic patients)  I have spent 20 minutes directly with the patient  Greater than 50% of this time was spent in counseling/coordination of care regarding: prognosis, instructions for management, patient and family education, importance of treatment compliance, risk factor reductions and impressions          Encounter provider Isabella Mcmullen [facility]    Provider located at [address] [address]    Recent Visits  Date Type Provider Dept   03/30/21 Telephone Stacie Walker DO [address] Internal Med   03/30/21 Telemedicine Vaishali Chnaey, [address] Internal Med   03/26/21 Telemedicine Mony Pop, [address] Internal Med   Showing recent visits within past 7 days and meeting all other requirements     Today's Visits  Date Type Provider Dept   04/01/21 200 [facility], [address] Internal Med   Showing today's visits and meeting all other requirements     Future Appointments  No visits were found meeting these conditions  Showing future appointments within next 150 days and meeting all other requirements      This virtual check-in was done via Cloud[employer] and patient was informed that this is a secure, HIPAA-compliant platform  She agrees to proceed  Patient agrees to participate in a virtual check in via telephone or video visit instead of presenting to the office to address urgent/immediate medical needs  Patient is aware this is a billable service  After connecting through [facility], the patient was identified by name and date of birth  Hannah Dsouza was informed that this was a telemedicine visit and that the exam was being conducted confidentially over secure lines  My office door was closed  No one else was in the room  Hannah Dsouza acknowledged consent and understanding of privacy and security of the telemedicine visit  I informed the patient that I have reviewed her record in Epic and presented the opportunity for her to ask any questions regarding the visit today  The patient agreed to participate  Subjective:   Hannah Dsouza is a 28 y o  female who has been screened for COVID-19  Symptom change since last report: improving  Patient's symptoms include fatigue, nasal congestion, rhinorrhea, cough and headache  Patient denies fever, chills, malaise, sore throat, anosmia, loss of taste, shortness of breath, chest tightness, abdominal pain, nausea, vomiting, diarrhea and myalgias       Elle Rankin has been staying home and has isolated themselves in her home  She is taking care to not share personal items and is cleaning all surfaces that are touched often, like counters, tabletops, and doorknobs using household cleaning sprays or wipes  She is wearing a mask when she leaves her room  Date of symptom onset: 3/19/2021    Lab Results   Component Value Date    SARSCOV2 Positive (A) 03/24/2021    SARSCOV2 Not Detected 12/16/2020     Past Medical History:   Diagnosis Date    Asthma     Chronic pain     Depression     Insomnia     Major depressive disorder, recurrent episode, severe (HCC)     Seasonal allergies      Past Surgical History:   Procedure Laterality Date    ABCESS DRAINAGE      Incision and dranage of skin abscess    NO PAST SURGERIES      UPPER GASTROINTESTINAL ENDOSCOPY       Current Outpatient Medications   Medication Sig Dispense Refill    albuterol (2 5 mg/3 mL) 0 083 % nebulizer solution INHALE 1 VIAL VIA NEBULIZER EVERY 4 HOURS AS NEEDED  1    albuterol (PROVENTIL HFA,VENTOLIN HFA) 90 mcg/act inhaler Inhale 2 puffs every 6 (six) hours as needed for wheezing 18 g 2    Etonogestrel (NEXPLANON) 68 MG IMPL Inject under the skin      fexofenadine (ALLEGRA) 30 MG tablet Take 30 mg by mouth 2 (two) times a day   predniSONE 5 mg tablet Begin treatment with 30 mg (6 tablets) per day  Decrease dose by 5 mg every 2 days  12 days total   Take with food  42 tablet 0    ibuprofen (MOTRIN) 600 mg tablet Take 1 tablet (600 mg total) by mouth every 6 (six) hours as needed for mild pain for up to 7 days 30 tablet 0     No current facility-administered medications for this visit  Allergies   Allergen Reactions    Ambrosia Artemisiifolia (Ragweed) Skin Test     Dust Mite Extract     Pollen Extract        Review of Systems   Constitutional: Positive for fatigue  Negative for chills and fever  HENT: Positive for congestion, postnasal drip and rhinorrhea  Negative for sore throat      Respiratory: Positive for cough and wheezing (occurs first thing in the morning)  Negative for chest tightness and shortness of breath  Gastrointestinal: Negative for abdominal pain, diarrhea, nausea and vomiting  Musculoskeletal: Negative for myalgias  Neurological: Positive for headaches  Psychiatric/Behavioral: Negative for sleep disturbance  Objective:    Vitals:    04/01/21 0930   Temp: 97 8 °F (36 6 °C)       Physical Exam  Vitals signs reviewed  Constitutional:       General: She is awake  She is not in acute distress  Appearance: Normal appearance  She is well-developed  She is not ill-appearing, toxic-appearing or diaphoretic  Eyes:      General: Lids are normal       Conjunctiva/sclera: Conjunctivae normal    Pulmonary:      Effort: Pulmonary effort is normal  No tachypnea, accessory muscle usage or respiratory distress  Neurological:      Mental Status: She is alert and oriented to person, place, and time  Psychiatric:         Attention and Perception: Attention normal          Mood and Affect: Mood normal          Speech: Speech normal          Behavior: Behavior normal  Behavior is cooperative  Thought Content: Thought content normal          Cognition and Memory: Cognition normal          Judgment: Judgment normal        VIRTUAL VISIT 5[address] [facility] acknowledges that she has consented to an online visit or consultation  She understands that the online visit is based solely on information provided by her, and that, in the absence of a face-to-face physical evaluation by the physician, the diagnosis she receives is both limited and provisional in terms of accuracy and completeness  This is not intended to replace a full medical face-to-face evaluation by the physician  Mireya Griffith understands and accepts these terms

## 2021-04-01 NOTE — LETTER
April 1, 2021     Patient: Camilo Tierney   YOB: 1985   Date of Visit: 4/1/2021       To Whom it May Concern:    Thu Montoya is under my professional care  She was seen in my office on 4/1/2021  At this point, she has completed the recommended 10 day quarantine and has shown improvement of her respiratory symptoms  Assuming her symptoms continue to resolve, she may return to work on 4/5/21  If you have any questions or concerns, please don't hesitate to call           Sincerely,          CINTHYA Bardales        CC: No Recipients

## 2021-04-01 NOTE — ASSESSMENT & PLAN NOTE
Sx onset 3/19, positive test 3/24  She is reporting a gradual improvement of cold symptoms  She continues with a cough; wheezing and productive of sputum in the AM but improves with albuterol  Still has pnd and nasal congestion  Headaches improving, smell/taste slightly improved  No fever/chills  Appetite normal   She is beyond the 10 day quarantine period and with symptoms now improving, ok to clear for return to work on Monday as long as symptoms do not worsen  She should complete her steroid taper and continue vitamins and prn mucinex, albuterol  Call with concerns

## 2021-04-02 ENCOUNTER — HOSPITAL ENCOUNTER (EMERGENCY)
Facility: HOSPITAL | Age: 36
Discharge: HOME | End: 2021-04-02
Payer: COMMERCIAL

## 2021-04-02 VITALS
DIASTOLIC BLOOD PRESSURE: 82 MMHG | SYSTOLIC BLOOD PRESSURE: 152 MMHG | WEIGHT: 293 LBS | HEART RATE: 84 BPM | OXYGEN SATURATION: 99 % | BODY MASS INDEX: 50.02 KG/M2 | HEIGHT: 64 IN | RESPIRATION RATE: 18 BRPM | TEMPERATURE: 98.3 F

## 2021-04-02 DIAGNOSIS — L02.212 ABSCESS OF SCAPULAR REGION: Primary | ICD-10-CM

## 2021-04-02 DIAGNOSIS — L02.212 ABSCESS OF SCAPULAR REGION: ICD-10-CM

## 2021-04-02 PROCEDURE — 10061 I&D ABSCESS COMP/MULTIPLE: CPT | Performed by: PHYSICIAN ASSISTANT

## 2021-04-02 PROCEDURE — 76882 US LMTD JT/FCL EVL NVASC XTR: CPT | Performed by: PHYSICIAN ASSISTANT

## 2021-04-02 PROCEDURE — 99284 EMERGENCY DEPT VISIT MOD MDM: CPT | Performed by: PHYSICIAN ASSISTANT

## 2021-04-02 PROCEDURE — 99283 EMERGENCY DEPT VISIT LOW MDM: CPT

## 2021-04-02 PROCEDURE — 87070 CULTURE OTHR SPECIMN AEROBIC: CPT | Performed by: PHYSICIAN ASSISTANT

## 2021-04-02 PROCEDURE — 87205 SMEAR GRAM STAIN: CPT | Performed by: PHYSICIAN ASSISTANT

## 2021-04-02 RX ORDER — BUPIVACAINE HYDROCHLORIDE 2.5 MG/ML
20 INJECTION, SOLUTION EPIDURAL; INFILTRATION; INTRACAUDAL ONCE
Status: COMPLETED | OUTPATIENT
Start: 2021-04-02 | End: 2021-04-02

## 2021-04-02 RX ORDER — LIDOCAINE HYDROCHLORIDE 10 MG/ML
20 INJECTION, SOLUTION EPIDURAL; INFILTRATION; INTRACAUDAL; PERINEURAL ONCE
Status: COMPLETED | OUTPATIENT
Start: 2021-04-02 | End: 2021-04-02

## 2021-04-02 RX ORDER — SULFAMETHOXAZOLE AND TRIMETHOPRIM 800; 160 MG/1; MG/1
1 TABLET ORAL 2 TIMES DAILY
Qty: 14 TABLET | Refills: 0 | Status: SHIPPED | OUTPATIENT
Start: 2021-04-02 | End: 2021-04-09

## 2021-04-02 RX ORDER — OXYCODONE HYDROCHLORIDE AND ACETAMINOPHEN 5; 325 MG/1; MG/1
1 TABLET ORAL EVERY 6 HOURS PRN
Qty: 8 TABLET | Refills: 0 | Status: SHIPPED | OUTPATIENT
Start: 2021-04-02 | End: 2021-04-04

## 2021-04-02 RX ORDER — OXYCODONE HYDROCHLORIDE AND ACETAMINOPHEN 5; 325 MG/1; MG/1
1 TABLET ORAL ONCE
Status: COMPLETED | OUTPATIENT
Start: 2021-04-02 | End: 2021-04-02

## 2021-04-02 RX ADMIN — LIDOCAINE HYDROCHLORIDE 20 ML: 10 INJECTION, SOLUTION EPIDURAL; INFILTRATION; INTRACAUDAL; PERINEURAL at 12:56

## 2021-04-02 RX ADMIN — OXYCODONE HYDROCHLORIDE AND ACETAMINOPHEN 1 TABLET: 5; 325 TABLET ORAL at 12:55

## 2021-04-02 RX ADMIN — BUPIVACAINE HYDROCHLORIDE 20 ML: 2.5 INJECTION, SOLUTION EPIDURAL; INFILTRATION; INTRACAUDAL at 12:55

## 2021-04-02 NOTE — Clinical Note
Edyta Griffin was seen and treated in our emergency department on 4/2/2021  Diagnosis: Bilateral Scapular Abscesses    Allegra Milano    She may return on this date: 04/07/2021         If you have any questions or concerns, please don't hesitate to call        Juve Ramirez PA-C    ______________________________           _______________          _______________  Hospital Representative                              Date                                Time

## 2021-04-02 NOTE — ED PROVIDER NOTES
History  Chief Complaint   Patient presents with    Abscess     Reports abscesses to back, x 2 days, history of same, denies fevers  28year old female with hx of recurrent abscesses comes in today c/o R > L back abscesses that began 2 days ago  Hasn't tried anything yet  Reports the one on the right hurts more and she feels like it needs to be drained  She reports the left one is "itchy, so I think that one will go away on it's own with antibiotics  Prior to Admission Medications   Prescriptions Last Dose Informant Patient Reported? Taking? Etonogestrel (NEXPLANON) 76 MG IMPL Unknown at Unknown time  Yes No   Sig: Inject under the skin   albuterol (2 5 mg/3 mL) 0 083 % nebulizer solution Past Week at Unknown time  Yes Yes   Sig: INHALE 1 VIAL VIA NEBULIZER EVERY 4 HOURS AS NEEDED   albuterol (PROVENTIL HFA,VENTOLIN HFA) 90 mcg/act inhaler Past Week at Unknown time  No Yes   Sig: Inhale 2 puffs every 6 (six) hours as needed for wheezing   fexofenadine (ALLEGRA) 30 MG tablet Past Week at Unknown time  Yes Yes   Sig: Take 30 mg by mouth 2 (two) times a day  predniSONE 5 mg tablet 4/2/2021 at Unknown time  No Yes   Sig: Begin treatment with 30 mg (6 tablets) per day  Decrease dose by 5 mg every 2 days  12 days total   Take with food  Facility-Administered Medications: None       Past Medical History:   Diagnosis Date    Asthma     Chronic pain     Depression     Insomnia     Major depressive disorder, recurrent episode, severe (HCC)     Seasonal allergies        Past Surgical History:   Procedure Laterality Date    ABCESS DRAINAGE      Incision and dranage of skin abscess    NO PAST SURGERIES      UPPER GASTROINTESTINAL ENDOSCOPY         Family History   Problem Relation Age of Onset    Other Father         Cardiac disorder    Hypertension Father     Diabetes Maternal Grandmother      I have reviewed and agree with the history as documented      E-Cigarette/Vaping    E-Cigarette Use Never User      E-Cigarette/Vaping Substances     Social History     Tobacco Use    Smoking status: Never Smoker    Smokeless tobacco: Never Used   Substance Use Topics    Alcohol use: No    Drug use: No       Review of Systems   Constitutional: Negative for fever  HENT: Negative for nosebleeds  Eyes: Negative for redness  Respiratory: Negative for shortness of breath  Cardiovascular: Negative for chest pain  Gastrointestinal: Negative for blood in stool  Genitourinary: Negative for hematuria  Musculoskeletal: Negative for gait problem  Skin: Negative for rash  Neurological: Negative for seizures  Psychiatric/Behavioral: Negative for behavioral problems  Physical Exam  Physical Exam  Constitutional:       Appearance: Normal appearance  Comments: Morbidly obese   HENT:      Head: Normocephalic and atraumatic  Nose: No rhinorrhea  Mouth/Throat:      Mouth: Mucous membranes are moist    Eyes:      Extraocular Movements: Extraocular movements intact  Neck:      Musculoskeletal: Normal range of motion  Pulmonary:      Effort: Pulmonary effort is normal    Musculoskeletal: Normal range of motion  Back:    Skin:     General: Skin is warm and dry  Neurological:      General: No focal deficit present  Mental Status: She is alert     Psychiatric:         Mood and Affect: Mood normal          Behavior: Behavior normal        R border of abscess      L abscess border        Vital Signs  ED Triage Vitals   Temperature Pulse Respirations Blood Pressure SpO2   04/02/21 1218 04/02/21 1218 04/02/21 1218 04/02/21 1218 04/02/21 1218   98 3 °F (36 8 °C) 84 18 152/82 99 %      Temp Source Heart Rate Source Patient Position - Orthostatic VS BP Location FiO2 (%)   04/02/21 1218 04/02/21 1218 04/02/21 1218 04/02/21 1218 --   Oral Monitor Sitting Left arm       Pain Score       04/02/21 1255       6           Vitals:    04/02/21 1218   BP: 152/82   Pulse: 84   Patient Position - Orthostatic VS: Sitting         Visual Acuity      ED Medications  Medications   lidocaine (PF) (XYLOCAINE-MPF) 1 % injection 20 mL (20 mL Infiltration Given 4/2/21 1256)   bupivacaine (PF) (MARCAINE) 0 25 % injection 20 mL (20 mL Infiltration Given 4/2/21 1255)   oxyCODONE-acetaminophen (PERCOCET) 5-325 mg per tablet 1 tablet (1 tablet Oral Given 4/2/21 1255)       Diagnostic Studies  Results Reviewed     Procedure Component Value Units Date/Time    Wound culture and Gram stain [[id number]] Collected: 04/02/21 1422    Lab Status: In process Specimen: Wound from Back Updated: 04/02/21 1425    Wound culture and Gram stain [[id number]] Collected: 04/02/21 1422    Lab Status: In process Specimen: Wound from Back Updated: 04/02/21 1425                 No orders to display              Procedures  Incision and drain    Date/Time: 4/2/2021 1:20 PM  Performed by: Ferny Denney PA-C  Authorized by: Ferny Denney PA-C   [place] Protocol:  Procedure performed by: Aysha Renteria RN)  Consent: Verbal consent obtained  Risks and benefits: risks, benefits and alternatives were discussed  Consent given by: patient  Patient understanding: patient states understanding of the procedure being performed  Patient identity confirmed: verbally with patient      Patient location:  Bedside  Location:     Type:  Abscess    Size:  6 x 3 cm    Location:  Trunk    Trunk location:  Back (R scapula)  Pre-procedure details:     Skin preparation:  Betadine  Anesthesia (see MAR for exact dosages): Anesthesia method:  Local infiltration    Local anesthetic:  Lidocaine 1% w/o epi and bupivacaine 0 25% WITH epi (9 mL)  Procedure details:     Incision types:  Elliptical (3 cm)    Scalpel blade:  15    Approach:  Open    Incision and drainage depth: 1 cm  Wound management:  Probed and deloculated and irrigated with saline    Irrigation with saline:   And iodine 60 mL    Hemostat:  Curved    Drainage:  Bloody and purulent    Drainage amount: Copious    Wound treatment:  Packing placed    Packing material: 2" iodoform  Post-procedure details:     Patient tolerance of procedure: Tolerated well, no immediate complications  Incision and drain    Date/Time: 4/2/2021 1:45 PM  Performed by: Garret Snell PA-C  Authorized by: Garret Snell PA-C   Universal Protocol:  Patient identity confirmed: verbally with patient      Patient location:  Bedside  Location:     Type:  Abscess    Size:  7 x 3    Location:  Trunk    Trunk location:  Back  Pre-procedure details:     Skin preparation:  Betadine  Anesthesia (see MAR for exact dosages): Anesthesia method:  Local infiltration    Local anesthetic:  Lidocaine 1% w/o epi and bupivacaine 0 25% WITH epi (8)  Procedure details:     Complexity:  Simple    Incision types:  Single straight (4 5 cm)    Scalpel blade:  15    Approach:  Open    Incision and drainage depth: 1 cm  Wound management:  Probed and deloculated and irrigated with saline    Irrigation with saline: And iodine 60 mL    Hemostat:  Curved    Drainage:  Bloody and purulent    Drainage amount:  Copious    Wound treatment:  Packing placed    Packing material: 2" iodoform  Post-procedure details:     Patient tolerance of procedure:   Tolerated well, no immediate complications             ED Course                                           MDM    Disposition  Final diagnoses:   Abscess of scapular region - right   Abscess of scapular region - Left     Time reflects when diagnosis was documented in both MDM as applicable and the Disposition within this note     Time User Action Codes Description Comment    4/2/2021  2:24 PM Foss Daily Add [L02 212] Abscess of scapular region     4/2/2021  2:24 PM Foss Daily Modify [M33 347] Abscess of scapular region right    4/2/2021  2:24 PM Foss Daily Add [D69 463] Abscess of scapular region     4/2/2021  2:24 PM Foss Daily Modify [L02 212] Abscess of scapular region Left ED Disposition     ED Disposition Condition Date/Time Comment    Discharge Stable Fri Apr 2, 2021  2:23 PM [address] discharge to home/self care  Follow-up Information     Follow up With Specialties Details Why Contact Info    Gisselle Louise MD General Surgery   [address]  [address]  [address] [facility] [address]  KALYANI Alvarado MD General Surgery   [address]  [address] [address] [address]  [phone]            Discharge Medication List as of 4/2/2021  2:28 PM      START taking these medications    Details   oxyCODONE-acetaminophen (PERCOCET) 5-325 mg per tablet Take 1 tablet by mouth every 6 (six) hours as needed for severe pain for up to 2 daysMax Daily Amount: 4 tablets, Starting Fri 4/2/2021, Until Sun 4/4/2021, Normal      sulfamethoxazole-trimethoprim (BACTRIM DS) 800-160 mg per tablet Take 1 tablet by mouth 2 (two) times a day for 7 days smx-tmp DS (BACTRIM) 800-160 mg tabs (1tab q12 D10), Starting Fri 4/2/2021, Until Fri 4/9/2021, Normal         CONTINUE these medications which have NOT CHANGED    Details   albuterol (2 5 mg/3 mL) 0 083 % nebulizer solution INHALE 1 VIAL VIA NEBULIZER EVERY 4 HOURS AS NEEDED, Historical Med      albuterol (PROVENTIL HFA,VENTOLIN HFA) 90 mcg/act inhaler Inhale 2 puffs every 6 (six) hours as needed for wheezing, Starting Fri 5/11/2018, Normal      fexofenadine (ALLEGRA) 30 MG tablet Take 30 mg by mouth 2 (two) times a day , Until Discontinued, Historical Med      predniSONE 5 mg tablet Begin treatment with 30 mg (6 tablets) per day  Decrease dose by 5 mg every 2 days  12 days total   Take with food , Normal      Etonogestrel (NEXPLANON) 76 MG IMPL Inject under the skin, Historical Med           No discharge procedures on file      PDMP Review     None          ED Provider  Electronically Signed by           Bryant Wahl PA-C  04/02/21 6736

## 2021-04-06 ENCOUNTER — OFFICE VISIT (OUTPATIENT)
Dept: SURGERY | Facility: CLINIC | Age: 36
End: 2021-04-06
Payer: COMMERCIAL

## 2021-04-06 VITALS
BODY MASS INDEX: 50.02 KG/M2 | HEIGHT: 64 IN | WEIGHT: 293 LBS | HEART RATE: 86 BPM | DIASTOLIC BLOOD PRESSURE: 88 MMHG | SYSTOLIC BLOOD PRESSURE: 140 MMHG

## 2021-04-06 DIAGNOSIS — L30.4 INTERTRIGO: Primary | ICD-10-CM

## 2021-04-06 PROBLEM — D72.819 WBC DECREASED: Status: RESOLVED | Noted: 2018-11-30 | Resolved: 2021-04-06

## 2021-04-06 PROBLEM — D53.9 MACROCYTIC ANEMIA: Status: RESOLVED | Noted: 2018-11-30 | Resolved: 2021-04-06

## 2021-04-06 PROBLEM — L02.212 CUTANEOUS ABSCESS OF BACK EXCLUDING BUTTOCKS: Status: ACTIVE | Noted: 2021-04-06

## 2021-04-06 PROBLEM — J45.21 MILD INTERMITTENT ASTHMA WITH EXACERBATION: Status: RESOLVED | Noted: 2021-03-23 | Resolved: 2021-04-06

## 2021-04-06 LAB
BACTERIA WND AEROBE CULT: NORMAL
BACTERIA WND AEROBE CULT: NORMAL
GRAM STN SPEC: NORMAL

## 2021-04-06 PROCEDURE — 1036F TOBACCO NON-USER: CPT | Performed by: SURGERY

## 2021-04-06 PROCEDURE — 3008F BODY MASS INDEX DOCD: CPT | Performed by: SURGERY

## 2021-04-06 PROCEDURE — 99203 OFFICE O/P NEW LOW 30 MIN: CPT | Performed by: SURGERY

## 2021-04-06 RX ORDER — NYSTATIN 100000 [USP'U]/G
POWDER TOPICAL 2 TIMES DAILY
Status: DISCONTINUED | OUTPATIENT
Start: 2021-04-06 | End: 2021-04-06

## 2021-04-06 RX ORDER — NYSTATIN 100000 [USP'U]/G
POWDER TOPICAL 2 TIMES DAILY
Qty: 60 G | Refills: 0 | Status: SHIPPED | OUTPATIENT
Start: 2021-04-06 | End: 2022-06-01

## 2021-04-06 NOTE — PATIENT INSTRUCTIONS
Patient may shower daily  Change dressings and use dry gauze at incision and drainage sites  Patient instructed to use antifungal powder in her skin creases to avoid maceration of the skin  Prescription sent for nystatin powder  Patient instructed to use Hibiclens soap for showers

## 2021-04-06 NOTE — ED PROCEDURE NOTE
Procedure  POC MSK/Soft Tissue US    Date/Time: 4/2/2021 12:38 PM  Performed by: Ferny Denney PA-C  Authorized by: Ferny Denney PA-C     Patient location:  Bedside  Performed by:  NP/PA  Procedure:     Performed: soft tissue ultrasound    Procedure details:     Exam Type:  Diagnostic    Longitudinal view:  Obtained    Transverse view:  Obtained    Image quality: diagnostic      Image availability:  Still images obtained  Soft tissue ultrasound:     Soft tissue indications: swelling, pain and suspected abscess      Anatomic location:  Upper back    Soft tissue findings: subcutaneous collection (comment)      Collection size (cm):  7  Interpretation:     Soft tissue impressions: consistent with abscess                       Ferny Denney PA-C  04/06/21 1152

## 2021-04-06 NOTE — PROGRESS NOTES
Assessment/Plan:     Diagnoses and all orders for this visit:    Intertrigo  -     nystatin (MYCOSTATIN) powder        Status post incision and drainage of cutaneous abscess bilateral scapular area  Packing removed  Dressing applied  Culture report on both sides have grown mixed jacqueline  Instructions given for wound care  Patient told to apply nystatin powder in her skin creases to avoid intertrigo  Also advised to use Hibiclens soap for showering  Subjective:      Patient ID: Alison Hale is a 28 y o  female  HPI     Patient has history of recurrent cutaneous abscesses which tend to occur in her skin creases  She has required frequent visits to the emergency room for incision and drainage as well as has required hospitalization for IV antibiotic therapy  Last week she underwent bilateral incision and drainage of cutaneous abscesses in the scapular area in [place] emergency room on April 2, 2021  Both the sites were packed and patient was given a course of p o  Bactrim  The following portions of the patient's history were reviewed and updated as appropriate: allergies, current medications, past family history, past medical history, past social history, past surgical history, and problem list     Review of Systems      No history of diabetes  No history of MRSA infections  Objective:      /88   Pulse 86   Ht 5' 4" (1 626 m)   Wt (!) 171 kg (377 lb)   LMP 03/19/2021   BMI 64 71 kg/m²        Physical Exam      Both incision and drainage sites on the scapular areas were addressed  Packing was removed  The wound cavities are now clean  Dry gauze dressing was applied  Patient has intertrigo in her skin creases which cause maceration of the skin and is the cause of the recurrent abscesses

## 2021-05-02 ENCOUNTER — IMMUNIZATIONS (OUTPATIENT)
Dept: FAMILY MEDICINE CLINIC | Facility: HOSPITAL | Age: 36
End: 2021-05-02

## 2021-05-02 DIAGNOSIS — Z23 ENCOUNTER FOR IMMUNIZATION: Primary | ICD-10-CM

## 2021-05-02 PROCEDURE — 91300 SARS-COV-2 / COVID-19 MRNA VACCINE (PFIZER-BIONTECH) 30 MCG: CPT

## 2021-05-02 PROCEDURE — 0001A SARS-COV-2 / COVID-19 MRNA VACCINE (PFIZER-BIONTECH) 30 MCG: CPT

## 2021-05-23 ENCOUNTER — IMMUNIZATIONS (OUTPATIENT)
Dept: FAMILY MEDICINE CLINIC | Facility: HOSPITAL | Age: 36
End: 2021-05-23

## 2021-05-23 DIAGNOSIS — Z23 ENCOUNTER FOR IMMUNIZATION: Primary | ICD-10-CM

## 2021-05-23 PROCEDURE — 0002A SARS-COV-2 / COVID-19 MRNA VACCINE (PFIZER-BIONTECH) 30 MCG: CPT

## 2021-05-23 PROCEDURE — 91300 SARS-COV-2 / COVID-19 MRNA VACCINE (PFIZER-BIONTECH) 30 MCG: CPT

## 2021-10-10 ENCOUNTER — HOSPITAL ENCOUNTER (EMERGENCY)
Facility: HOSPITAL | Age: 36
Discharge: HOME | End: 2021-10-10
Payer: COMMERCIAL

## 2021-10-10 VITALS
TEMPERATURE: 98 F | DIASTOLIC BLOOD PRESSURE: 63 MMHG | SYSTOLIC BLOOD PRESSURE: 155 MMHG | RESPIRATION RATE: 17 BRPM | HEART RATE: 102 BPM | OXYGEN SATURATION: 98 %

## 2021-10-10 DIAGNOSIS — L02.412 ABSCESS OF LEFT AXILLA: Primary | ICD-10-CM

## 2021-10-10 PROCEDURE — 10061 I&D ABSCESS COMP/MULTIPLE: CPT | Performed by: PHYSICIAN ASSISTANT

## 2021-10-10 PROCEDURE — 99282 EMERGENCY DEPT VISIT SF MDM: CPT | Performed by: PHYSICIAN ASSISTANT

## 2021-10-10 PROCEDURE — 99282 EMERGENCY DEPT VISIT SF MDM: CPT

## 2021-10-10 RX ORDER — LIDOCAINE HYDROCHLORIDE AND EPINEPHRINE 10; 10 MG/ML; UG/ML
10 INJECTION, SOLUTION INFILTRATION; PERINEURAL ONCE
Status: COMPLETED | OUTPATIENT
Start: 2021-10-10 | End: 2021-10-10

## 2021-10-10 RX ORDER — BACITRACIN, NEOMYCIN, POLYMYXIN B 400; 3.5; 5 [USP'U]/G; MG/G; [USP'U]/G
1 OINTMENT TOPICAL ONCE
Status: COMPLETED | OUTPATIENT
Start: 2021-10-10 | End: 2021-10-10

## 2021-10-10 RX ADMIN — LIDOCAINE HYDROCHLORIDE,EPINEPHRINE BITARTRATE 10 ML: 10; .01 INJECTION, SOLUTION INFILTRATION; PERINEURAL at 13:19

## 2021-10-10 RX ADMIN — BACITRACIN, NEOMYCIN, POLYMYXIN B 1 SMALL APPLICATION: 400; 3.5; 5 OINTMENT TOPICAL at 13:19

## 2021-10-22 ENCOUNTER — APPOINTMENT (EMERGENCY)
Dept: RADIOLOGY | Facility: HOSPITAL | Age: 36
End: 2021-10-22
Payer: COMMERCIAL

## 2021-10-22 ENCOUNTER — HOSPITAL ENCOUNTER (EMERGENCY)
Facility: HOSPITAL | Age: 36
Discharge: HOME | End: 2021-10-22
Attending: EMERGENCY MEDICINE | Admitting: EMERGENCY MEDICINE
Payer: COMMERCIAL

## 2021-10-22 ENCOUNTER — HOSPITAL ENCOUNTER (OUTPATIENT)
Facility: HOSPITAL | Age: 36
Setting detail: OBSERVATION
Discharge: HOME | End: 2021-10-24
Attending: EMERGENCY MEDICINE | Admitting: INTERNAL MEDICINE
Payer: COMMERCIAL

## 2021-10-22 VITALS
TEMPERATURE: 97.9 F | DIASTOLIC BLOOD PRESSURE: 57 MMHG | RESPIRATION RATE: 20 BRPM | SYSTOLIC BLOOD PRESSURE: 126 MMHG | OXYGEN SATURATION: 95 % | HEART RATE: 75 BPM

## 2021-10-22 DIAGNOSIS — J30.2 SEASONAL ALLERGIES: ICD-10-CM

## 2021-10-22 DIAGNOSIS — J45.51 SEVERE PERSISTENT ASTHMA WITH EXACERBATION: Primary | ICD-10-CM

## 2021-10-22 DIAGNOSIS — J45.901 ASTHMA EXACERBATION: ICD-10-CM

## 2021-10-22 DIAGNOSIS — J45.901 ASTHMA EXACERBATION: Primary | ICD-10-CM

## 2021-10-22 PROBLEM — F32.A DEPRESSION: Status: ACTIVE | Noted: 2021-10-22

## 2021-10-22 LAB
ALBUMIN SERPL BCP-MCNC: 3.5 G/DL (ref 3.4–4.8)
ALBUMIN SERPL BCP-MCNC: 3.5 G/DL (ref 3.4–4.8)
ALP SERPL-CCNC: 46.6 U/L (ref 35–140)
ALP SERPL-CCNC: 62.2 U/L (ref 35–140)
ALT SERPL W P-5'-P-CCNC: 13 U/L (ref 5–54)
ALT SERPL W P-5'-P-CCNC: 14 U/L (ref 5–54)
ANION GAP SERPL CALCULATED.3IONS-SCNC: 7 MMOL/L (ref 4–13)
ANION GAP SERPL CALCULATED.3IONS-SCNC: 7 MMOL/L (ref 4–13)
AST SERPL W P-5'-P-CCNC: 10 U/L (ref 15–41)
AST SERPL W P-5'-P-CCNC: 12 U/L (ref 15–41)
ATRIAL RATE: 80 BPM
BASOPHILS # BLD AUTO: 0.02 THOUSANDS/ΜL (ref 0–0.1)
BASOPHILS NFR BLD AUTO: 0 % (ref 0–1)
BILIRUB SERPL-MCNC: 0.41 MG/DL (ref 0.3–1.2)
BILIRUB SERPL-MCNC: 0.48 MG/DL (ref 0.3–1.2)
BNP SERPL-MCNC: 66.6 PG/ML (ref 1–100)
BUN SERPL-MCNC: 13 MG/DL (ref 6–20)
BUN SERPL-MCNC: 15 MG/DL (ref 6–20)
CALCIUM SERPL-MCNC: 8.2 MG/DL (ref 8.4–10.2)
CALCIUM SERPL-MCNC: 8.2 MG/DL (ref 8.4–10.2)
CHLORIDE SERPL-SCNC: 104 MMOL/L (ref 96–108)
CHLORIDE SERPL-SCNC: 105 MMOL/L (ref 96–108)
CO2 SERPL-SCNC: 26 MMOL/L (ref 22–33)
CO2 SERPL-SCNC: 28 MMOL/L (ref 22–33)
CREAT SERPL-MCNC: 0.65 MG/DL (ref 0.4–1.1)
CREAT SERPL-MCNC: 0.67 MG/DL (ref 0.4–1.1)
EOSINOPHIL # BLD AUTO: 0.11 THOUSAND/ΜL (ref 0–0.61)
EOSINOPHIL NFR BLD AUTO: 1 % (ref 0–6)
ERYTHROCYTE [DISTWIDTH] IN BLOOD BY AUTOMATED COUNT: 11.8 % (ref 11.6–15.1)
ERYTHROCYTE [DISTWIDTH] IN BLOOD BY AUTOMATED COUNT: 11.9 % (ref 11.6–15.1)
FLUAV RNA RESP QL NAA+PROBE: NEGATIVE
FLUBV RNA RESP QL NAA+PROBE: NEGATIVE
GFR SERPL CREATININE-BSD FRML MDRD: 113 ML/MIN/1.73SQ M
GFR SERPL CREATININE-BSD FRML MDRD: 115 ML/MIN/1.73SQ M
GLUCOSE SERPL-MCNC: 100 MG/DL (ref 65–140)
GLUCOSE SERPL-MCNC: 95 MG/DL (ref 65–140)
HCG SERPL QL: NEGATIVE
HCT VFR BLD AUTO: 35.8 % (ref 34.8–46.1)
HCT VFR BLD AUTO: 36.6 % (ref 34.8–46.1)
HGB BLD-MCNC: 11.5 G/DL (ref 11.5–15.4)
HGB BLD-MCNC: 11.7 G/DL (ref 11.5–15.4)
IMM GRANULOCYTES # BLD AUTO: 0.02 THOUSAND/UL (ref 0–0.2)
IMM GRANULOCYTES NFR BLD AUTO: 0 % (ref 0–2)
LACTATE SERPL-SCNC: 1 MMOL/L (ref 0–2)
LYMPHOCYTES # BLD AUTO: 1.75 THOUSANDS/ΜL (ref 0.6–4.47)
LYMPHOCYTES NFR BLD AUTO: 17 % (ref 14–44)
MAGNESIUM SERPL-MCNC: 1.8 MG/DL (ref 1.6–2.6)
MCH RBC QN AUTO: 30.7 PG (ref 26.8–34.3)
MCH RBC QN AUTO: 31.2 PG (ref 26.8–34.3)
MCHC RBC AUTO-ENTMCNC: 32 G/DL (ref 31.4–37.4)
MCHC RBC AUTO-ENTMCNC: 32.1 G/DL (ref 31.4–37.4)
MCV RBC AUTO: 96 FL (ref 82–98)
MCV RBC AUTO: 97 FL (ref 82–98)
MONOCYTES # BLD AUTO: 0.67 THOUSAND/ΜL (ref 0.17–1.22)
MONOCYTES NFR BLD AUTO: 7 % (ref 4–12)
NEUTROPHILS # BLD AUTO: 7.58 THOUSANDS/ΜL (ref 1.85–7.62)
NEUTS SEG NFR BLD AUTO: 75 % (ref 43–75)
P AXIS: 42 DEGREES
PLATELET # BLD AUTO: 233 THOUSANDS/UL (ref 149–390)
PLATELET # BLD AUTO: 236 THOUSANDS/UL (ref 149–390)
PMV BLD AUTO: 10 FL (ref 8.9–12.7)
PMV BLD AUTO: 10.6 FL (ref 8.9–12.7)
POTASSIUM SERPL-SCNC: 3.8 MMOL/L (ref 3.5–5)
POTASSIUM SERPL-SCNC: 3.9 MMOL/L (ref 3.5–5)
PR INTERVAL: 154 MS
PROT SERPL-MCNC: 7 G/DL (ref 6.4–8.3)
PROT SERPL-MCNC: 7.1 G/DL (ref 6.4–8.3)
QRS AXIS: 1 DEGREES
QRSD INTERVAL: 97 MS
QT INTERVAL: 398 MS
QTC INTERVAL: 457 MS
RBC # BLD AUTO: 3.69 MILLION/UL (ref 3.81–5.12)
RBC # BLD AUTO: 3.81 MILLION/UL (ref 3.81–5.12)
RSV RNA RESP QL NAA+PROBE: NEGATIVE
SARS-COV-2 RNA RESP QL NAA+PROBE: NEGATIVE
SODIUM SERPL-SCNC: 137 MMOL/L (ref 133–145)
SODIUM SERPL-SCNC: 140 MMOL/L (ref 133–145)
T WAVE AXIS: -22 DEGREES
TROPONIN I SERPL-MCNC: <0.03 NG/ML (ref 0–0.07)
VENTRICULAR RATE: 79 BPM
WBC # BLD AUTO: 10.15 THOUSAND/UL (ref 4.31–10.16)
WBC # BLD AUTO: 6.79 THOUSAND/UL (ref 4.31–10.16)

## 2021-10-22 PROCEDURE — 94664 DEMO&/EVAL PT USE INHALER: CPT

## 2021-10-22 PROCEDURE — 93005 ELECTROCARDIOGRAM TRACING: CPT

## 2021-10-22 PROCEDURE — 85025 COMPLETE CBC W/AUTO DIFF WBC: CPT | Performed by: EMERGENCY MEDICINE

## 2021-10-22 PROCEDURE — 94760 N-INVAS EAR/PLS OXIMETRY 1: CPT

## 2021-10-22 PROCEDURE — 80053 COMPREHEN METABOLIC PANEL: CPT | Performed by: EMERGENCY MEDICINE

## 2021-10-22 PROCEDURE — 83605 ASSAY OF LACTIC ACID: CPT | Performed by: EMERGENCY MEDICINE

## 2021-10-22 PROCEDURE — 94150 VITAL CAPACITY TEST: CPT

## 2021-10-22 PROCEDURE — 0241U HB NFCT DS VIR RESP RNA 4 TRGT: CPT | Performed by: EMERGENCY MEDICINE

## 2021-10-22 PROCEDURE — 96365 THER/PROPH/DIAG IV INF INIT: CPT

## 2021-10-22 PROCEDURE — 94644 CONT INHLJ TX 1ST HOUR: CPT

## 2021-10-22 PROCEDURE — 84703 CHORIONIC GONADOTROPIN ASSAY: CPT | Performed by: EMERGENCY MEDICINE

## 2021-10-22 PROCEDURE — 99285 EMERGENCY DEPT VISIT HI MDM: CPT

## 2021-10-22 PROCEDURE — 99285 EMERGENCY DEPT VISIT HI MDM: CPT | Performed by: EMERGENCY MEDICINE

## 2021-10-22 PROCEDURE — 71045 X-RAY EXAM CHEST 1 VIEW: CPT

## 2021-10-22 PROCEDURE — 83880 ASSAY OF NATRIURETIC PEPTIDE: CPT | Performed by: EMERGENCY MEDICINE

## 2021-10-22 PROCEDURE — 93010 ELECTROCARDIOGRAM REPORT: CPT | Performed by: INTERNAL MEDICINE

## 2021-10-22 PROCEDURE — 83735 ASSAY OF MAGNESIUM: CPT | Performed by: EMERGENCY MEDICINE

## 2021-10-22 PROCEDURE — 99220 PR INITIAL OBSERVATION CARE/DAY 70 MINUTES: CPT | Performed by: PHYSICIAN ASSISTANT

## 2021-10-22 PROCEDURE — 94640 AIRWAY INHALATION TREATMENT: CPT

## 2021-10-22 PROCEDURE — 96375 TX/PRO/DX INJ NEW DRUG ADDON: CPT

## 2021-10-22 PROCEDURE — 96361 HYDRATE IV INFUSION ADD-ON: CPT

## 2021-10-22 PROCEDURE — 36415 COLL VENOUS BLD VENIPUNCTURE: CPT | Performed by: EMERGENCY MEDICINE

## 2021-10-22 PROCEDURE — 84484 ASSAY OF TROPONIN QUANT: CPT | Performed by: EMERGENCY MEDICINE

## 2021-10-22 RX ORDER — PREDNISONE 50 MG/1
50 TABLET ORAL DAILY
Qty: 4 TABLET | Refills: 0 | Status: SHIPPED | OUTPATIENT
Start: 2021-10-23 | End: 2021-10-24 | Stop reason: HOSPADM

## 2021-10-22 RX ORDER — ALBUTEROL SULFATE 90 UG/1
2 AEROSOL, METERED RESPIRATORY (INHALATION) EVERY 4 HOURS PRN
Qty: 18 G | Refills: 0 | Status: SHIPPED | OUTPATIENT
Start: 2021-10-22 | End: 2021-11-21

## 2021-10-22 RX ORDER — LORATADINE 10 MG/1
10 TABLET ORAL DAILY
Status: DISCONTINUED | OUTPATIENT
Start: 2021-10-23 | End: 2021-10-24 | Stop reason: HOSPADM

## 2021-10-22 RX ORDER — DEXAMETHASONE SODIUM PHOSPHATE 10 MG/ML
10 INJECTION, SOLUTION INTRAMUSCULAR; INTRAVENOUS ONCE
Status: COMPLETED | OUTPATIENT
Start: 2021-10-22 | End: 2021-10-22

## 2021-10-22 RX ORDER — MAGNESIUM SULFATE HEPTAHYDRATE 40 MG/ML
2 INJECTION, SOLUTION INTRAVENOUS ONCE
Status: COMPLETED | OUTPATIENT
Start: 2021-10-22 | End: 2021-10-22

## 2021-10-22 RX ORDER — BENZONATATE 100 MG/1
100 CAPSULE ORAL 3 TIMES DAILY PRN
Status: DISCONTINUED | OUTPATIENT
Start: 2021-10-22 | End: 2021-10-24 | Stop reason: HOSPADM

## 2021-10-22 RX ORDER — SODIUM CHLORIDE FOR INHALATION 0.9 %
12 VIAL, NEBULIZER (ML) INHALATION ONCE
Status: COMPLETED | OUTPATIENT
Start: 2021-10-22 | End: 2021-10-22

## 2021-10-22 RX ORDER — FLUTICASONE PROPIONATE 110 UG/1
2 AEROSOL, METERED RESPIRATORY (INHALATION)
Status: DISCONTINUED | OUTPATIENT
Start: 2021-10-22 | End: 2021-10-24 | Stop reason: HOSPADM

## 2021-10-22 RX ORDER — PREDNISONE 20 MG/1
60 TABLET ORAL ONCE
Status: COMPLETED | OUTPATIENT
Start: 2021-10-22 | End: 2021-10-22

## 2021-10-22 RX ORDER — SODIUM CHLORIDE FOR INHALATION 0.9 %
3 VIAL, NEBULIZER (ML) INHALATION ONCE
Status: COMPLETED | OUTPATIENT
Start: 2021-10-22 | End: 2021-10-22

## 2021-10-22 RX ORDER — SODIUM CHLORIDE FOR INHALATION 0.9 %
3 VIAL, NEBULIZER (ML) INHALATION
Status: DISCONTINUED | OUTPATIENT
Start: 2021-10-22 | End: 2021-10-23

## 2021-10-22 RX ORDER — ALBUTEROL SULFATE 2.5 MG/3ML
2.5 SOLUTION RESPIRATORY (INHALATION) EVERY 6 HOURS PRN
Qty: 75 ML | Refills: 0 | Status: ON HOLD | OUTPATIENT
Start: 2021-10-22 | End: 2021-10-24 | Stop reason: SDUPTHER

## 2021-10-22 RX ORDER — METHYLPREDNISOLONE SODIUM SUCCINATE 40 MG/ML
40 INJECTION, POWDER, LYOPHILIZED, FOR SOLUTION INTRAMUSCULAR; INTRAVENOUS EVERY 8 HOURS SCHEDULED
Status: DISCONTINUED | OUTPATIENT
Start: 2021-10-23 | End: 2021-10-24 | Stop reason: HOSPADM

## 2021-10-22 RX ORDER — ALBUTEROL SULFATE 90 UG/1
2 AEROSOL, METERED RESPIRATORY (INHALATION) EVERY 6 HOURS PRN
Status: DISCONTINUED | OUTPATIENT
Start: 2021-10-22 | End: 2021-10-24 | Stop reason: HOSPADM

## 2021-10-22 RX ORDER — METHYLPREDNISOLONE SODIUM SUCCINATE 40 MG/ML
40 INJECTION, POWDER, LYOPHILIZED, FOR SOLUTION INTRAMUSCULAR; INTRAVENOUS EVERY 6 HOURS SCHEDULED
Status: DISCONTINUED | OUTPATIENT
Start: 2021-10-23 | End: 2021-10-22

## 2021-10-22 RX ORDER — ACETAMINOPHEN 325 MG/1
650 TABLET ORAL EVERY 6 HOURS PRN
Status: DISCONTINUED | OUTPATIENT
Start: 2021-10-22 | End: 2021-10-24 | Stop reason: HOSPADM

## 2021-10-22 RX ORDER — ACETAMINOPHEN 325 MG/1
975 TABLET ORAL ONCE
Status: COMPLETED | OUTPATIENT
Start: 2021-10-22 | End: 2021-10-22

## 2021-10-22 RX ORDER — LEVALBUTEROL 1.25 MG/.5ML
1.25 SOLUTION, CONCENTRATE RESPIRATORY (INHALATION)
Status: DISCONTINUED | OUTPATIENT
Start: 2021-10-22 | End: 2021-10-24 | Stop reason: HOSPADM

## 2021-10-22 RX ADMIN — IPRATROPIUM BROMIDE 0.5 MG: 0.5 SOLUTION RESPIRATORY (INHALATION) at 23:31

## 2021-10-22 RX ADMIN — IPRATROPIUM BROMIDE 1 MG: 0.5 SOLUTION RESPIRATORY (INHALATION) at 03:08

## 2021-10-22 RX ADMIN — DEXAMETHASONE SODIUM PHOSPHATE 10 MG: 10 INJECTION, SOLUTION INTRAMUSCULAR; INTRAVENOUS at 20:39

## 2021-10-22 RX ADMIN — MAGNESIUM SULFATE HEPTAHYDRATE 2 G: 40 INJECTION, SOLUTION INTRAVENOUS at 20:47

## 2021-10-22 RX ADMIN — IPRATROPIUM BROMIDE 1 MG: 0.5 SOLUTION RESPIRATORY (INHALATION) at 20:39

## 2021-10-22 RX ADMIN — ISODIUM CHLORIDE 3 ML: 0.03 SOLUTION RESPIRATORY (INHALATION) at 23:31

## 2021-10-22 RX ADMIN — SODIUM CHLORIDE 1000 ML: 0.9 INJECTION, SOLUTION INTRAVENOUS at 03:31

## 2021-10-22 RX ADMIN — ALBUTEROL SULFATE 10 MG: 2.5 SOLUTION RESPIRATORY (INHALATION) at 03:08

## 2021-10-22 RX ADMIN — ISODIUM CHLORIDE 12 ML: 0.03 SOLUTION RESPIRATORY (INHALATION) at 20:39

## 2021-10-22 RX ADMIN — LEVALBUTEROL HYDROCHLORIDE 1.25 MG: 1.25 SOLUTION, CONCENTRATE RESPIRATORY (INHALATION) at 23:31

## 2021-10-22 RX ADMIN — ACETAMINOPHEN 975 MG: 325 TABLET, FILM COATED ORAL at 22:09

## 2021-10-22 RX ADMIN — ISODIUM CHLORIDE 3 ML: 0.03 SOLUTION RESPIRATORY (INHALATION) at 03:08

## 2021-10-22 RX ADMIN — ALBUTEROL SULFATE 10 MG: 2.5 SOLUTION RESPIRATORY (INHALATION) at 20:39

## 2021-10-22 RX ADMIN — MAGNESIUM SULFATE HEPTAHYDRATE 2 G: 40 INJECTION, SOLUTION INTRAVENOUS at 03:14

## 2021-10-22 RX ADMIN — PREDNISONE 60 MG: 20 TABLET ORAL at 03:40

## 2021-10-23 LAB
ANION GAP SERPL CALCULATED.3IONS-SCNC: 8 MMOL/L (ref 4–13)
BASOPHILS # BLD AUTO: 0.01 THOUSANDS/ΜL (ref 0–0.1)
BASOPHILS NFR BLD AUTO: 0 % (ref 0–1)
BUN SERPL-MCNC: 10 MG/DL (ref 6–20)
CALCIUM SERPL-MCNC: 8.2 MG/DL (ref 8.4–10.2)
CHLORIDE SERPL-SCNC: 105 MMOL/L (ref 96–108)
CO2 SERPL-SCNC: 25 MMOL/L (ref 22–33)
CREAT SERPL-MCNC: 0.55 MG/DL (ref 0.4–1.1)
EOSINOPHIL # BLD AUTO: 0 THOUSAND/ΜL (ref 0–0.61)
EOSINOPHIL NFR BLD AUTO: 0 % (ref 0–6)
ERYTHROCYTE [DISTWIDTH] IN BLOOD BY AUTOMATED COUNT: 11.9 % (ref 11.6–15.1)
GFR SERPL CREATININE-BSD FRML MDRD: 121 ML/MIN/1.73SQ M
GLUCOSE P FAST SERPL-MCNC: 158 MG/DL (ref 70–105)
GLUCOSE SERPL-MCNC: 158 MG/DL (ref 65–140)
HCT VFR BLD AUTO: 35.2 % (ref 34.8–46.1)
HGB BLD-MCNC: 11.3 G/DL (ref 11.5–15.4)
IMM GRANULOCYTES # BLD AUTO: 0.03 THOUSAND/UL (ref 0–0.2)
IMM GRANULOCYTES NFR BLD AUTO: 0 % (ref 0–2)
LYMPHOCYTES # BLD AUTO: 0.76 THOUSANDS/ΜL (ref 0.6–4.47)
LYMPHOCYTES NFR BLD AUTO: 8 % (ref 14–44)
MCH RBC QN AUTO: 31 PG (ref 26.8–34.3)
MCHC RBC AUTO-ENTMCNC: 32.1 G/DL (ref 31.4–37.4)
MCV RBC AUTO: 96 FL (ref 82–98)
MONOCYTES # BLD AUTO: 0.24 THOUSAND/ΜL (ref 0.17–1.22)
MONOCYTES NFR BLD AUTO: 3 % (ref 4–12)
NEUTROPHILS # BLD AUTO: 8.29 THOUSANDS/ΜL (ref 1.85–7.62)
NEUTS SEG NFR BLD AUTO: 89 % (ref 43–75)
PLATELET # BLD AUTO: 246 THOUSANDS/UL (ref 149–390)
PMV BLD AUTO: 10.7 FL (ref 8.9–12.7)
POTASSIUM SERPL-SCNC: 4.4 MMOL/L (ref 3.5–5)
PROCALCITONIN SERPL-MCNC: <0.05 NG/ML
RBC # BLD AUTO: 3.65 MILLION/UL (ref 3.81–5.12)
SODIUM SERPL-SCNC: 138 MMOL/L (ref 133–145)
WBC # BLD AUTO: 9.33 THOUSAND/UL (ref 4.31–10.16)

## 2021-10-23 PROCEDURE — 99225 PR SBSQ OBSERVATION CARE/DAY 25 MINUTES: CPT | Performed by: INTERNAL MEDICINE

## 2021-10-23 PROCEDURE — 94640 AIRWAY INHALATION TREATMENT: CPT

## 2021-10-23 PROCEDURE — 90471 IMMUNIZATION ADMIN: CPT | Performed by: PHYSICIAN ASSISTANT

## 2021-10-23 PROCEDURE — 85025 COMPLETE CBC W/AUTO DIFF WBC: CPT | Performed by: NURSE PRACTITIONER

## 2021-10-23 PROCEDURE — 80048 BASIC METABOLIC PNL TOTAL CA: CPT | Performed by: NURSE PRACTITIONER

## 2021-10-23 PROCEDURE — 94760 N-INVAS EAR/PLS OXIMETRY 1: CPT

## 2021-10-23 PROCEDURE — 90686 IIV4 VACC NO PRSV 0.5 ML IM: CPT | Performed by: PHYSICIAN ASSISTANT

## 2021-10-23 PROCEDURE — 84145 PROCALCITONIN (PCT): CPT | Performed by: NURSE PRACTITIONER

## 2021-10-23 PROCEDURE — 87070 CULTURE OTHR SPECIMN AEROBIC: CPT | Performed by: PHYSICIAN ASSISTANT

## 2021-10-23 PROCEDURE — 87205 SMEAR GRAM STAIN: CPT | Performed by: PHYSICIAN ASSISTANT

## 2021-10-23 RX ADMIN — METHYLPREDNISOLONE SODIUM SUCCINATE 40 MG: 40 INJECTION, POWDER, FOR SOLUTION INTRAMUSCULAR; INTRAVENOUS at 14:10

## 2021-10-23 RX ADMIN — METHYLPREDNISOLONE SODIUM SUCCINATE 40 MG: 40 INJECTION, POWDER, FOR SOLUTION INTRAMUSCULAR; INTRAVENOUS at 04:12

## 2021-10-23 RX ADMIN — FLUTICASONE PROPIONATE 2 PUFF: 110 AEROSOL, METERED RESPIRATORY (INHALATION) at 20:15

## 2021-10-23 RX ADMIN — LEVALBUTEROL HYDROCHLORIDE 1.25 MG: 1.25 SOLUTION, CONCENTRATE RESPIRATORY (INHALATION) at 20:15

## 2021-10-23 RX ADMIN — IPRATROPIUM BROMIDE 0.5 MG: 0.5 SOLUTION RESPIRATORY (INHALATION) at 07:42

## 2021-10-23 RX ADMIN — ACETAMINOPHEN 650 MG: 325 TABLET, FILM COATED ORAL at 19:48

## 2021-10-23 RX ADMIN — ENOXAPARIN SODIUM 60 MG: 60 INJECTION SUBCUTANEOUS at 08:36

## 2021-10-23 RX ADMIN — LEVALBUTEROL HYDROCHLORIDE 1.25 MG: 1.25 SOLUTION, CONCENTRATE RESPIRATORY (INHALATION) at 13:37

## 2021-10-23 RX ADMIN — ENOXAPARIN SODIUM 60 MG: 60 INJECTION SUBCUTANEOUS at 18:00

## 2021-10-23 RX ADMIN — FLUTICASONE PROPIONATE 2 PUFF: 110 AEROSOL, METERED RESPIRATORY (INHALATION) at 09:21

## 2021-10-23 RX ADMIN — LORATADINE 10 MG: 10 TABLET ORAL at 08:37

## 2021-10-23 RX ADMIN — LEVALBUTEROL HYDROCHLORIDE 1.25 MG: 1.25 SOLUTION, CONCENTRATE RESPIRATORY (INHALATION) at 07:42

## 2021-10-23 RX ADMIN — INFLUENZA VIRUS VACCINE 0.5 ML: 15; 15; 15; 15 SUSPENSION INTRAMUSCULAR at 00:11

## 2021-10-23 RX ADMIN — METHYLPREDNISOLONE SODIUM SUCCINATE 40 MG: 40 INJECTION, POWDER, FOR SOLUTION INTRAMUSCULAR; INTRAVENOUS at 22:48

## 2021-10-23 RX ADMIN — IPRATROPIUM BROMIDE 0.5 MG: 0.5 SOLUTION RESPIRATORY (INHALATION) at 20:16

## 2021-10-23 RX ADMIN — IPRATROPIUM BROMIDE 0.5 MG: 0.5 SOLUTION RESPIRATORY (INHALATION) at 13:37

## 2021-10-23 RX ADMIN — ENOXAPARIN SODIUM 60 MG: 60 INJECTION SUBCUTANEOUS at 00:11

## 2021-10-24 VITALS
TEMPERATURE: 97.5 F | HEART RATE: 57 BPM | HEIGHT: 64 IN | OXYGEN SATURATION: 97 % | DIASTOLIC BLOOD PRESSURE: 79 MMHG | SYSTOLIC BLOOD PRESSURE: 129 MMHG | BODY MASS INDEX: 50.02 KG/M2 | RESPIRATION RATE: 16 BRPM | WEIGHT: 293 LBS

## 2021-10-24 LAB — PROCALCITONIN SERPL-MCNC: <0.05 NG/ML

## 2021-10-24 PROCEDURE — 94640 AIRWAY INHALATION TREATMENT: CPT

## 2021-10-24 PROCEDURE — 84145 PROCALCITONIN (PCT): CPT | Performed by: NURSE PRACTITIONER

## 2021-10-24 PROCEDURE — 94760 N-INVAS EAR/PLS OXIMETRY 1: CPT

## 2021-10-24 PROCEDURE — 99217 PR OBSERVATION CARE DISCHARGE MANAGEMENT: CPT | Performed by: INTERNAL MEDICINE

## 2021-10-24 RX ORDER — PREDNISONE 10 MG/1
TABLET ORAL
Qty: 40 TABLET | Refills: 0 | Status: SHIPPED | OUTPATIENT
Start: 2021-10-24 | End: 2021-11-09

## 2021-10-24 RX ORDER — FLUTICASONE PROPIONATE 110 UG/1
2 AEROSOL, METERED RESPIRATORY (INHALATION)
Qty: 12 G | Refills: 0 | Status: SHIPPED | OUTPATIENT
Start: 2021-10-24 | End: 2021-11-05

## 2021-10-24 RX ORDER — ALBUTEROL SULFATE 2.5 MG/3ML
2.5 SOLUTION RESPIRATORY (INHALATION) EVERY 4 HOURS
Qty: 75 ML | Refills: 0
Start: 2021-10-24 | End: 2021-10-28

## 2021-10-24 RX ADMIN — ENOXAPARIN SODIUM 60 MG: 60 INJECTION SUBCUTANEOUS at 08:53

## 2021-10-24 RX ADMIN — FLUTICASONE PROPIONATE 2 PUFF: 110 AEROSOL, METERED RESPIRATORY (INHALATION) at 08:01

## 2021-10-24 RX ADMIN — IPRATROPIUM BROMIDE 0.5 MG: 0.5 SOLUTION RESPIRATORY (INHALATION) at 08:02

## 2021-10-24 RX ADMIN — LEVALBUTEROL HYDROCHLORIDE 1.25 MG: 1.25 SOLUTION, CONCENTRATE RESPIRATORY (INHALATION) at 08:01

## 2021-10-24 RX ADMIN — METHYLPREDNISOLONE SODIUM SUCCINATE 40 MG: 40 INJECTION, POWDER, FOR SOLUTION INTRAMUSCULAR; INTRAVENOUS at 05:46

## 2021-10-24 RX ADMIN — LORATADINE 10 MG: 10 TABLET ORAL at 08:50

## 2021-10-25 ENCOUNTER — TRANSITIONAL CARE MANAGEMENT (OUTPATIENT)
Dept: INTERNAL MEDICINE CLINIC | Facility: CLINIC | Age: 36
End: 2021-10-25

## 2021-10-25 LAB
ATRIAL RATE: 90 BPM
BACTERIA SPT RESP CULT: ABNORMAL
GRAM STN SPEC: ABNORMAL
P AXIS: 55 DEGREES
PR INTERVAL: 155 MS
QRS AXIS: 15 DEGREES
QRSD INTERVAL: 87 MS
QT INTERVAL: 371 MS
QTC INTERVAL: 454 MS
T WAVE AXIS: -12 DEGREES
VENTRICULAR RATE: 90 BPM

## 2021-10-25 PROCEDURE — 93010 ELECTROCARDIOGRAM REPORT: CPT | Performed by: INTERNAL MEDICINE

## 2021-10-27 ENCOUNTER — OFFICE VISIT (OUTPATIENT)
Dept: INTERNAL MEDICINE CLINIC | Facility: CLINIC | Age: 36
End: 2021-10-27
Payer: COMMERCIAL

## 2021-10-27 VITALS
HEART RATE: 102 BPM | WEIGHT: 293 LBS | TEMPERATURE: 97.8 F | OXYGEN SATURATION: 91 % | BODY MASS INDEX: 50.02 KG/M2 | HEIGHT: 64 IN | RESPIRATION RATE: 16 BRPM | SYSTOLIC BLOOD PRESSURE: 142 MMHG | DIASTOLIC BLOOD PRESSURE: 80 MMHG

## 2021-10-27 DIAGNOSIS — E55.9 VITAMIN D DEFICIENCY: ICD-10-CM

## 2021-10-27 DIAGNOSIS — E66.01 MORBID OBESITY WITH BMI OF 70 AND OVER, ADULT (HCC): ICD-10-CM

## 2021-10-27 DIAGNOSIS — J45.41 MODERATE PERSISTENT ASTHMA WITH ACUTE EXACERBATION: Primary | ICD-10-CM

## 2021-10-27 PROBLEM — U07.1 COVID-19: Status: RESOLVED | Noted: 2021-03-26 | Resolved: 2021-10-27

## 2021-10-27 PROCEDURE — 1111F DSCHRG MED/CURRENT MED MERGE: CPT | Performed by: INTERNAL MEDICINE

## 2021-10-27 PROCEDURE — 99495 TRANSJ CARE MGMT MOD F2F 14D: CPT | Performed by: INTERNAL MEDICINE

## 2021-10-29 ENCOUNTER — TELEPHONE (OUTPATIENT)
Dept: INTERNAL MEDICINE CLINIC | Facility: CLINIC | Age: 36
End: 2021-10-29

## 2021-11-01 ENCOUNTER — TELEPHONE (OUTPATIENT)
Dept: PULMONOLOGY | Facility: CLINIC | Age: 36
End: 2021-11-01

## 2021-11-05 ENCOUNTER — OFFICE VISIT (OUTPATIENT)
Dept: PULMONOLOGY | Facility: CLINIC | Age: 36
End: 2021-11-05
Payer: COMMERCIAL

## 2021-11-05 ENCOUNTER — APPOINTMENT (OUTPATIENT)
Dept: LAB | Facility: CLINIC | Age: 36
End: 2021-11-05
Payer: COMMERCIAL

## 2021-11-05 VITALS
WEIGHT: 293 LBS | OXYGEN SATURATION: 98 % | DIASTOLIC BLOOD PRESSURE: 80 MMHG | HEIGHT: 64 IN | TEMPERATURE: 96.2 F | SYSTOLIC BLOOD PRESSURE: 128 MMHG | HEART RATE: 88 BPM | BODY MASS INDEX: 50.02 KG/M2 | RESPIRATION RATE: 16 BRPM

## 2021-11-05 DIAGNOSIS — J45.41 MODERATE PERSISTENT ASTHMA WITH ACUTE EXACERBATION: ICD-10-CM

## 2021-11-05 DIAGNOSIS — J30.9 ALLERGIC RHINITIS, UNSPECIFIED SEASONALITY, UNSPECIFIED TRIGGER: ICD-10-CM

## 2021-11-05 DIAGNOSIS — E66.01 MORBID OBESITY WITH BMI OF 70 AND OVER, ADULT (HCC): ICD-10-CM

## 2021-11-05 DIAGNOSIS — K21.9 GASTROESOPHAGEAL REFLUX DISEASE WITHOUT ESOPHAGITIS: ICD-10-CM

## 2021-11-05 DIAGNOSIS — J45.51 SEVERE PERSISTENT ASTHMA WITH ACUTE EXACERBATION: Primary | ICD-10-CM

## 2021-11-05 DIAGNOSIS — E55.9 VITAMIN D DEFICIENCY: ICD-10-CM

## 2021-11-05 LAB
25(OH)D3 SERPL-MCNC: 15.7 NG/ML (ref 30–100)
ALBUMIN SERPL BCP-MCNC: 3.2 G/DL (ref 3.5–5)
ALP SERPL-CCNC: 68 U/L (ref 46–116)
ALT SERPL W P-5'-P-CCNC: 30 U/L (ref 12–78)
ANION GAP SERPL CALCULATED.3IONS-SCNC: 6 MMOL/L (ref 4–13)
AST SERPL W P-5'-P-CCNC: 8 U/L (ref 5–45)
BILIRUB SERPL-MCNC: 0.57 MG/DL (ref 0.2–1)
BUN SERPL-MCNC: 16 MG/DL (ref 5–25)
CALCIUM ALBUM COR SERPL-MCNC: 9 MG/DL (ref 8.3–10.1)
CALCIUM SERPL-MCNC: 8.4 MG/DL (ref 8.3–10.1)
CHLORIDE SERPL-SCNC: 103 MMOL/L (ref 100–108)
CHOLEST SERPL-MCNC: 144 MG/DL (ref 50–200)
CO2 SERPL-SCNC: 30 MMOL/L (ref 21–32)
CREAT SERPL-MCNC: 0.76 MG/DL (ref 0.6–1.3)
GFR SERPL CREATININE-BSD FRML MDRD: 101 ML/MIN/1.73SQ M
GLUCOSE P FAST SERPL-MCNC: 88 MG/DL (ref 65–99)
HDLC SERPL-MCNC: 63 MG/DL
LDLC SERPL CALC-MCNC: 70 MG/DL (ref 0–100)
NONHDLC SERPL-MCNC: 81 MG/DL
POTASSIUM SERPL-SCNC: 4.4 MMOL/L (ref 3.5–5.3)
PROT SERPL-MCNC: 7.6 G/DL (ref 6.4–8.2)
SODIUM SERPL-SCNC: 139 MMOL/L (ref 136–145)
TRIGL SERPL-MCNC: 57 MG/DL
TSH SERPL DL<=0.05 MIU/L-ACNC: 0.76 UIU/ML (ref 0.36–3.74)

## 2021-11-05 PROCEDURE — 1111F DSCHRG MED/CURRENT MED MERGE: CPT | Performed by: INTERNAL MEDICINE

## 2021-11-05 PROCEDURE — 99205 OFFICE O/P NEW HI 60 MIN: CPT | Performed by: INTERNAL MEDICINE

## 2021-11-05 PROCEDURE — 94010 BREATHING CAPACITY TEST: CPT | Performed by: INTERNAL MEDICINE

## 2021-11-05 PROCEDURE — 84443 ASSAY THYROID STIM HORMONE: CPT

## 2021-11-05 PROCEDURE — 80061 LIPID PANEL: CPT

## 2021-11-05 PROCEDURE — 36415 COLL VENOUS BLD VENIPUNCTURE: CPT

## 2021-11-05 PROCEDURE — 80053 COMPREHEN METABOLIC PANEL: CPT

## 2021-11-05 PROCEDURE — 82306 VITAMIN D 25 HYDROXY: CPT

## 2021-11-05 RX ORDER — FEXOFENADINE HCL 180 MG/1
180 TABLET ORAL DAILY
Qty: 90 TABLET | Refills: 0 | Status: SHIPPED | OUTPATIENT
Start: 2021-11-05 | End: 2022-03-16

## 2021-11-09 ENCOUNTER — OFFICE VISIT (OUTPATIENT)
Dept: INTERNAL MEDICINE CLINIC | Facility: CLINIC | Age: 36
End: 2021-11-09
Payer: COMMERCIAL

## 2021-11-09 VITALS
BODY MASS INDEX: 50.02 KG/M2 | DIASTOLIC BLOOD PRESSURE: 86 MMHG | HEIGHT: 64 IN | TEMPERATURE: 97.1 F | WEIGHT: 293 LBS | RESPIRATION RATE: 18 BRPM | SYSTOLIC BLOOD PRESSURE: 122 MMHG

## 2021-11-09 DIAGNOSIS — Z00.00 ANNUAL PHYSICAL EXAM: Primary | ICD-10-CM

## 2021-11-09 DIAGNOSIS — E55.9 VITAMIN D DEFICIENCY: ICD-10-CM

## 2021-11-09 PROBLEM — L02.212 CUTANEOUS ABSCESS OF BACK EXCLUDING BUTTOCKS: Status: RESOLVED | Noted: 2021-04-06 | Resolved: 2021-11-09

## 2021-11-09 PROCEDURE — 99395 PREV VISIT EST AGE 18-39: CPT | Performed by: INTERNAL MEDICINE

## 2021-11-09 PROCEDURE — 1036F TOBACCO NON-USER: CPT | Performed by: INTERNAL MEDICINE

## 2021-11-09 RX ORDER — ERGOCALCIFEROL 1.25 MG/1
50000 CAPSULE ORAL
Qty: 8 CAPSULE | Refills: 1 | Status: SHIPPED | OUTPATIENT
Start: 2021-11-11 | End: 2022-06-01

## 2021-11-29 ENCOUNTER — OFFICE VISIT (OUTPATIENT)
Dept: BARIATRICS | Facility: CLINIC | Age: 36
End: 2021-11-29

## 2021-11-29 VITALS
BODY MASS INDEX: 50.02 KG/M2 | WEIGHT: 293 LBS | HEIGHT: 64 IN | TEMPERATURE: 97.4 F | DIASTOLIC BLOOD PRESSURE: 88 MMHG | HEART RATE: 84 BPM | SYSTOLIC BLOOD PRESSURE: 124 MMHG

## 2021-11-29 VITALS — BODY MASS INDEX: 50.02 KG/M2 | HEIGHT: 64 IN | WEIGHT: 293 LBS

## 2021-11-29 DIAGNOSIS — E66.9 OBESITY, UNSPECIFIED: Primary | ICD-10-CM

## 2021-11-29 DIAGNOSIS — E66.01 MORBID OBESITY WITH BMI OF 70 AND OVER, ADULT (HCC): ICD-10-CM

## 2021-11-29 DIAGNOSIS — E66.9 OBESITY, CLASS I, BMI 30-34.9: Primary | ICD-10-CM

## 2021-11-29 PROCEDURE — RECHECK

## 2021-11-29 PROCEDURE — RECHECK: Performed by: DIETITIAN, REGISTERED

## 2021-11-29 PROCEDURE — 3008F BODY MASS INDEX DOCD: CPT | Performed by: INTERNAL MEDICINE

## 2021-12-04 ENCOUNTER — APPOINTMENT (OUTPATIENT)
Dept: LAB | Facility: CLINIC | Age: 36
End: 2021-12-04
Payer: COMMERCIAL

## 2021-12-04 DIAGNOSIS — J45.51 SEVERE PERSISTENT ASTHMA WITH ACUTE EXACERBATION: ICD-10-CM

## 2021-12-04 PROCEDURE — 86003 ALLG SPEC IGE CRUDE XTRC EA: CPT

## 2021-12-04 PROCEDURE — 82785 ASSAY OF IGE: CPT

## 2021-12-04 PROCEDURE — 36415 COLL VENOUS BLD VENIPUNCTURE: CPT

## 2021-12-07 LAB
A ALTERNATA IGE QN: <0.1 KUA/I
A FUMIGATUS IGE QN: <0.1 KUA/I
ALLERGEN COMMENT: ABNORMAL
BERMUDA GRASS IGE QN: 0.19 KUA/I
BOXELDER IGE QN: 0.33 KUA/I
C HERBARUM IGE QN: <0.1 KUA/I
CAT DANDER IGE QN: 5.19 KUA/I
CMN PIGWEED IGE QN: <0.1 KUA/I
COMMON RAGWEED IGE QN: 12.8 KUA/I
COTTONWOOD IGE QN: <0.1 KUA/I
D FARINAE IGE QN: <0.1 KUA/I
D PTERONYSS IGE QN: 0.13 KUA/I
DOG DANDER IGE QN: 1.58 KUA/I
LONDON PLANE IGE QN: 0.4 KUA/I
MOUSE URINE PROT IGE QN: 0.11 KUA/I
MT JUNIPER IGE QN: <0.1 KUA/I
MUGWORT IGE QN: 4.93 KUA/I
P NOTATUM IGE QN: <0.1 KUA/I
ROACH IGE QN: 7.94 KUA/I
SHEEP SORREL IGE QN: 0.13 KUA/I
SILVER BIRCH IGE QN: 5.87 KUA/I
TIMOTHY IGE QN: 54.3 KUA/I
TOTAL IGE SMQN RAST: 249 KU/L (ref 0–113)
WALNUT IGE QN: 0.84 KUA/I
WHITE ASH IGE QN: 1.06 KUA/I
WHITE ELM IGE QN: 0.11 KUA/I
WHITE MULBERRY IGE QN: <0.1 KUA/I
WHITE OAK IGE QN: 4.43 KUA/I

## 2021-12-16 ENCOUNTER — OFFICE VISIT (OUTPATIENT)
Dept: BARIATRICS | Facility: CLINIC | Age: 36
End: 2021-12-16

## 2021-12-16 VITALS — BODY MASS INDEX: 74.08 KG/M2 | WEIGHT: 293 LBS

## 2021-12-16 DIAGNOSIS — E66.01 MORBID OBESITY WITH BMI OF 70 AND OVER, ADULT (HCC): Primary | ICD-10-CM

## 2021-12-16 PROCEDURE — RECHECK: Performed by: DIETITIAN, REGISTERED

## 2021-12-21 ENCOUNTER — OFFICE VISIT (OUTPATIENT)
Dept: PULMONOLOGY | Facility: CLINIC | Age: 36
End: 2021-12-21
Payer: COMMERCIAL

## 2021-12-21 VITALS
DIASTOLIC BLOOD PRESSURE: 78 MMHG | WEIGHT: 293 LBS | HEART RATE: 80 BPM | SYSTOLIC BLOOD PRESSURE: 122 MMHG | TEMPERATURE: 98.7 F | HEIGHT: 64 IN | RESPIRATION RATE: 16 BRPM | BODY MASS INDEX: 50.02 KG/M2

## 2021-12-21 DIAGNOSIS — J30.9 ALLERGIC RHINITIS, UNSPECIFIED SEASONALITY, UNSPECIFIED TRIGGER: ICD-10-CM

## 2021-12-21 DIAGNOSIS — E66.01 MORBID OBESITY WITH BMI OF 70 AND OVER, ADULT (HCC): ICD-10-CM

## 2021-12-21 DIAGNOSIS — K21.9 GASTROESOPHAGEAL REFLUX DISEASE WITHOUT ESOPHAGITIS: ICD-10-CM

## 2021-12-21 DIAGNOSIS — J45.50 SEVERE PERSISTENT ASTHMA WITHOUT COMPLICATION: Primary | ICD-10-CM

## 2021-12-21 PROCEDURE — 1036F TOBACCO NON-USER: CPT | Performed by: INTERNAL MEDICINE

## 2021-12-21 PROCEDURE — 3008F BODY MASS INDEX DOCD: CPT | Performed by: INTERNAL MEDICINE

## 2021-12-21 PROCEDURE — 99214 OFFICE O/P EST MOD 30 MIN: CPT | Performed by: INTERNAL MEDICINE

## 2021-12-21 RX ORDER — MONTELUKAST SODIUM 10 MG/1
10 TABLET ORAL
Qty: 90 TABLET | Refills: 1 | Status: SHIPPED | OUTPATIENT
Start: 2021-12-21 | End: 2022-06-23

## 2021-12-28 ENCOUNTER — TELEMEDICINE (OUTPATIENT)
Dept: INTERNAL MEDICINE CLINIC | Facility: CLINIC | Age: 36
End: 2021-12-28
Payer: COMMERCIAL

## 2021-12-28 VITALS — TEMPERATURE: 100.2 F

## 2021-12-28 DIAGNOSIS — B34.9 VIRAL INFECTION, UNSPECIFIED: Primary | ICD-10-CM

## 2021-12-28 DIAGNOSIS — J45.50 SEVERE PERSISTENT ASTHMA WITHOUT COMPLICATION: ICD-10-CM

## 2021-12-28 PROCEDURE — 87636 SARSCOV2 & INF A&B AMP PRB: CPT | Performed by: INTERNAL MEDICINE

## 2021-12-28 PROCEDURE — 99214 OFFICE O/P EST MOD 30 MIN: CPT | Performed by: INTERNAL MEDICINE

## 2022-01-03 ENCOUNTER — TELEPHONE (OUTPATIENT)
Dept: INTERNAL MEDICINE CLINIC | Facility: CLINIC | Age: 37
End: 2022-01-03

## 2022-01-03 NOTE — TELEPHONE ENCOUNTER
Patient called for results for her covid test  Patient needs to have a neg result in order to go back to work

## 2022-01-04 ENCOUNTER — TELEMEDICINE (OUTPATIENT)
Dept: INTERNAL MEDICINE CLINIC | Facility: CLINIC | Age: 37
End: 2022-01-04
Payer: COMMERCIAL

## 2022-01-04 DIAGNOSIS — U07.1 COVID-19: Primary | ICD-10-CM

## 2022-01-04 PROCEDURE — 99213 OFFICE O/P EST LOW 20 MIN: CPT | Performed by: INTERNAL MEDICINE

## 2022-01-04 NOTE — LETTER
January 4, 2022    Patient: Eldon Garcia  YOB: 1985  Date of Last Encounter: 1/4/2022      To whom it may concern:     Eldon Garcia has tested positive for COVID-19 (Coronavirus)  She may return to work on 1/10/22, with improving symptoms and 24 hours without fever      Sincerely,         Brice Braun, [address] [facility] Internal Medicine

## 2022-01-04 NOTE — ASSESSMENT & PLAN NOTE
-sx onset 12/27  Currently experiencing improving fatigue, nasal congestion, rhinorrhea, cough, nausea and HA  -she is vaccinated but not yet booostered  -has h/o COVID 3/2021  -she is not a candidate for monoclonal infusion  -continue symptomatic tx with mucinex, nebs, fluids  -start vitamin D3 5000units daily, vitamin C 500mg daily and zinc 50mg daily  -she has completed 5d isolation, she is to continue masking for additional 5d  Requests return to work on 1/10/22   -schedule COVID booster in 4 weeks

## 2022-01-04 NOTE — PROGRESS NOTES
COVID-19 Outpatient Progress Note    Assessment/Plan:    Problem List Items Addressed This Visit        Other    [id number] - Primary     -sx onset 12/27  Currently experiencing improving fatigue, nasal congestion, rhinorrhea, cough, nausea and HA  -she is vaccinated but not yet booostered  -has h/o COVID 3/2021  -she is not a candidate for monoclonal infusion  -continue symptomatic tx with mucinex, nebs, fluids  -start vitamin D3 5000units daily, vitamin C 500mg daily and zinc 50mg daily  -she has completed 5d isolation, she is to continue masking for additional 5d  Requests return to work on 1/10/22   -schedule COVID booster in 4 weeks  Disposition:     Patient has COVID-19 infection  Based off CDC guidelines, they were recommended to isolate for 5 days from the date of the positive test  If they remain asymptomatic, isolation may be ended followed by 5 days of wearing a mask when around othes to minimize risk of infecting others  If they have a fever, continue to stay home until fever resolves for at least 24 hours  Completed 5d of isolation    I have spent 22 minutes directly with the patient  Greater than 50% of this time was spent in counseling/coordination of care regarding: diagnostic results, prognosis, risks and benefits of treatment options, instructions for management, patient and family education, importance of treatment compliance, risk factor reductions and impressions  46yo vaccinated but not yet boostered with h/o COVID 3/2021 who developed sx on 12/27  She is overall improved with resolving fatigue, nasal congestion, rhinorrhea, cough, nausea and HA  She is not a candidate for monoclonal infusion  Cont vitamins, nebs, mucinex, fluids  Schedule COVID booster in 4 weeks  She has completed 5d of isolation and she is to continue masking for 5d  Requests return to work on 1/10/22, work note provided       Encounter provider Brice Braun DO    Provider located at [address] [place] [address] [address] [place] [id number]    Recent Visits  Date Type Provider Dept   01/03/22 Telephone Iveth Wilcox [facility] Internal Med   12/28/21 Telemedicine Cindy Breen, [address] Internal Med   Showing recent visits within past 7 days and meeting all other requirements  Today's Visits  Date Type Provider Dept   01/04/22 Telemedicine Cindy Breen, [address] Internal Med   Showing today's visits and meeting all other requirements  Future Appointments  No visits were found meeting these conditions  Showing future appointments within next 150 days and meeting all other requirements     This virtual check-in was done via [employer] and patient was informed that this is a secure, HIPAA-compliant platform  She agrees to proceed  Patient agrees to participate in a virtual check in via telephone or video visit instead of presenting to the office to address urgent/immediate medical needs  Patient is aware this is a billable service  After connecting through [facility], the patient was identified by name and date of birth  Mohsen Petersen was informed that this was a telemedicine visit and that the exam was being conducted confidentially over secure lines  My office door was closed  Mohsen Petersen acknowledged consent and understanding of privacy and security of the telemedicine visit  I informed the patient that I have reviewed her record in Epic and presented the opportunity for her to ask any questions regarding the visit today  The patient agreed to participate  Verification of patient location:  Patient is located in the following state in which I hold an active license: [place]    Subjective:   Mohsen Petersen is a 39 y o  female who has been screened for COVID-19  Symptom change since last report: improving  Patient's symptoms include fatigue, nasal congestion, rhinorrhea, cough, nausea and headache   Patient denies fever, chills, sore throat, anosmia, loss of taste, shortness of breath, chest tightness, abdominal pain, vomiting, diarrhea and myalgias  Date of symptom onset: 12/27/2021  Date of positive COVID-19 PCR: 12/28/2021  COVID-19 vaccination status: Fully vaccinated (primary series)    Abhishek Ibarra has been staying home and has isolated themselves in her home  She is taking care to not share personal items and is cleaning all surfaces that are touched often, like counters, tabletops, and doorknobs using household cleaning sprays or wipes  She is wearing a mask when she leaves her room  Taking mucinex and nebs for cough, PND, HA, nausea  She no longer fever, body aches  She has been isolating  Lab Results   Component Value Date    SARSCOV2 Positive (A) 12/28/2021    SARSCOV2 Not Detected 12/16/2020     Past Medical History:   Diagnosis Date    Allergic rhinitis     Asthma     Chronic pain     COVID-19 3/26/2021    Cutaneous abscess of back excluding buttocks 4/6/2021    Depression     Insomnia     Major depressive disorder, recurrent episode, severe (HCC)     Seasonal allergies      Past Surgical History:   Procedure Laterality Date    ABCESS DRAINAGE      Incision and dranage of skin abscess    CHOLECYSTECTOMY      UPPER GASTROINTESTINAL ENDOSCOPY       Current Outpatient Medications   Medication Sig Dispense Refill    albuterol (2 5 mg/3 mL) 0 083 % nebulizer solution INHALE 1 VIAL VIA NEBULIZER EVERY 4 HOURS AS NEEDED  1    ergocalciferol (VITAMIN D2) 50,000 units Take 1 capsule (50,000 Units total) by mouth 2 (two) times a week with meals 8 capsule 1    Etonogestrel (NEXPLANON) 68 MG IMPL Inject under the skin       fexofenadine (ALLEGRA) 180 MG tablet Take 1 tablet (180 mg total) by mouth daily 90 tablet 0    fluticasone-vilanterol (Breo Ellipta) 200-25 MCG/INH inhaler Inhale 1 puff daily Rinse mouth after use   180 blister 0    montelukast (SINGULAIR) 10 mg tablet Take 1 tablet (10 mg total) by mouth daily at bedtime 90 tablet 1    nystatin (MYCOSTATIN) powder Apply topically 2 (two) times a day 60 g 0    ipratropium (ATROVENT) 0 02 % nebulizer solution Take 2 5 mL (0 5 mg total) by nebulization 3 (three) times a day (Patient not taking: Reported on 11/9/2021 ) 225 mL 0     No current facility-administered medications for this visit  Allergies   Allergen Reactions    Ambrosia Artemisiifolia (Ragweed) Skin Test     Dust Mite Extract     Pollen Extract        Review of Systems   Constitutional: Positive for fatigue  Negative for chills and fever  HENT: Positive for congestion and rhinorrhea  Negative for sore throat  Resolved anosmia   Respiratory: Positive for cough  Negative for chest tightness and shortness of breath  Gastrointestinal: Positive for nausea  Negative for abdominal pain, diarrhea and vomiting  Musculoskeletal: Negative for myalgias  Neurological: Positive for headaches  Objective: There were no vitals filed for this visit  Physical Exam  Constitutional:       General: She is not in acute distress  HENT:      Mouth/Throat:      Mouth: Mucous membranes are moist       Pharynx: Oropharynx is clear  No oropharyngeal exudate or posterior oropharyngeal erythema  Musculoskeletal:      Cervical back: Neck supple  Skin:     Coloration: Skin is not pale  Neurological:      Mental Status: She is alert and oriented to person, place, and time  Psychiatric:         Attention and Perception: Attention normal          Mood and Affect: Mood normal          Speech: Speech normal          Behavior: Behavior is cooperative  VIRTUAL VISIT [address] verbally agrees to participate in [employer]   Pt is aware that [employer] could be limited without vital signs or the ability to perform a full hands-on physical exam  Brittney Marlow understands she or the provider may request at any time to terminate the video visit and request the patient to seek care or treatment in person

## 2022-01-27 ENCOUNTER — CONSULT (OUTPATIENT)
Dept: CARDIOLOGY CLINIC | Facility: CLINIC | Age: 37
End: 2022-01-27
Payer: COMMERCIAL

## 2022-01-27 VITALS
HEART RATE: 72 BPM | BODY MASS INDEX: 50.02 KG/M2 | WEIGHT: 293 LBS | SYSTOLIC BLOOD PRESSURE: 128 MMHG | DIASTOLIC BLOOD PRESSURE: 76 MMHG | HEIGHT: 64 IN

## 2022-01-27 DIAGNOSIS — E66.01 OBESITY, CLASS III, BMI 40-49.9 (MORBID OBESITY) (HCC): ICD-10-CM

## 2022-01-27 DIAGNOSIS — Z01.810 PRE-OPERATIVE CARDIOVASCULAR EXAMINATION: Primary | ICD-10-CM

## 2022-01-27 PROCEDURE — 3008F BODY MASS INDEX DOCD: CPT | Performed by: INTERNAL MEDICINE

## 2022-01-27 PROCEDURE — 99204 OFFICE O/P NEW MOD 45 MIN: CPT | Performed by: INTERNAL MEDICINE

## 2022-01-27 PROCEDURE — 1036F TOBACCO NON-USER: CPT | Performed by: INTERNAL MEDICINE

## 2022-01-27 PROCEDURE — 93000 ELECTROCARDIOGRAM COMPLETE: CPT | Performed by: INTERNAL MEDICINE

## 2022-01-27 RX ORDER — MULTIVITAMIN
1 CAPSULE ORAL DAILY
COMMUNITY
End: 2022-06-01

## 2022-01-27 NOTE — PATIENT INSTRUCTIONS
Weight Management   AMBULATORY CARE:   Why it is important to manage your weight:  Being overweight increases your risk of health conditions such as heart disease, high blood pressure, type 2 diabetes, and certain types of cancer  It can also increase your risk for osteoarthritis, sleep apnea, and other respiratory problems  Aim for a slow, steady weight loss  Even a small amount of weight loss can lower your risk of health problems  How to lose weight safely:  A safe and healthy way to lose weight is to eat fewer calories and get regular exercise  · You can lose up about 1 pound a week by decreasing the number of calories you eat by 500 calories each day  You can decrease calories by eating smaller portion sizes or by cutting out high-calorie foods  Read labels to find out how many calories are in the foods you eat  · You can also burn calories with exercise such as walking, swimming, or biking  You will be more likely to keep weight off if you make these changes part of your lifestyle  Exercise at least 30 minutes per day on most days of the week  You can also fit in more physical activity by taking the stairs instead of the elevator or parking farther away from stores  Ask your healthcare provider about the best exercise plan for you  Healthy meal plan for weight management:  A healthy meal plan includes a variety of foods, contains fewer calories, and helps you stay healthy  A healthy meal plan includes the following:     · Eat whole-grain foods more often  A healthy meal plan should contain fiber  Fiber is the part of grains, fruits, and vegetables that is not broken down by your body  Whole-grain foods are healthy and provide extra fiber in your diet  Some examples of whole-grain foods are whole-wheat breads and pastas, oatmeal, brown rice, and bulgur  · Eat a variety of vegetables every day  Include dark, leafy greens such as spinach, kale, tisha greens, and mustard greens   Eat yellow and orange vegetables such as carrots, sweet potatoes, and winter squash  · Eat a variety of fruits every day  Choose fresh or canned fruit (canned in its own juice or light syrup) instead of juice  Fruit juice has very little or no fiber  · Eat low-fat dairy foods  Drink fat-free (skim) milk or 1% milk  Eat fat-free yogurt and low-fat cottage cheese  Try low-fat cheeses such as mozzarella and other reduced-fat cheeses  · Choose meat and other protein foods that are low in fat  Choose beans or other legumes such as split peas or lentils  Choose fish, skinless poultry (chicken or turkey), or lean cuts of red meat (beef or pork)  Before you cook meat or poultry, cut off any visible fat  · Use less fat and oil  Try baking foods instead of frying them  Add less fat, such as margarine, sour cream, regular salad dressing and mayonnaise to foods  Eat fewer high-fat foods  Some examples of high-fat foods include french fries, doughnuts, ice cream, and cakes  · Eat fewer sweets  Limit foods and drinks that are high in sugar  This includes candy, cookies, regular soda, and sweetened drinks  Ways to decrease calories:   · Eat smaller portions  ? Use a small plate with smaller servings  ? Do not eat second helpings  ? When you eat at a restaurant, ask for a box and place half of your meal in the box before you eat  ? Share an entrée with someone else  · Replace high-calorie snacks with healthy, low-calorie snacks  ? Choose fresh fruit, vegetables, fat-free rice cakes, or air-popped popcorn instead of potato chips, nuts, or chocolate  ? Choose water or calorie-free drinks instead of soda or sweetened drinks  · Do not shop for groceries when you are hungry  You may be more likely to make unhealthy food choices  Take a grocery list of healthy foods and shop after you have eaten  · Eat regular meals  Do not skip meals  Skipping meals can lead to overeating later in the day  This can make it harder for you to lose weight  Eat a healthy snack in place of a meal if you do not have time to eat a regular meal  Talk with a dietitian to help you create a meal plan and schedule that is right for you  Other things to consider as you try to lose weight:   · Be aware of situations that may give you the urge to overeat, such as eating while watching television  Find ways to avoid these situations  For example, read a book, go for a walk, or do crafts  · Meet with a weight loss support group or friends who are also trying to lose weight  This may help you stay motivated to continue working on your weight loss goals  © Copyright [employer] 2021 Information is for End User's use only and may not be sold, redistributed or otherwise used for commercial purposes  All illustrations and images included in CareNotes® are the copyrighted property of [employer]  or Mary Rasheed   The above information is an  only  It is not intended as medical advice for individual conditions or treatments  Talk to your doctor, nurse or pharmacist before following any medical regimen to see if it is safe and effective for you

## 2022-01-27 NOTE — PROGRESS NOTES
Cardiology Consultation     Lukas Jovel  [id number]  1985  [facility] [place]  [address] [facility] [address] [facility] [id number]    1  Pre-operative cardiovascular examination  POCT ECG   2  Obesity, Class III, BMI 40-49 9 (morbid obesity) ([facility] )  Ambulatory referral to Cardiology     Chief Complaint   Patient presents with   [address] [facility] [address] Patient Visit     establish cardiac care    Pre-op Clearance     bariatric surgery     Shortness of Breath     asthma, on exertion     HPI: Patient is here for cardiac evaluation prior to bariatric surgery  She has chronic shortness of breath that is attributed to her asthma, but otherwise feels well  No reported chest pain, palpitations, lightheadedness, syncope, LE edema, orthopnea, PND, or significant weight changes  Patient remains active without any increased fatigue out of the ordinary        Patient Active Problem List   Diagnosis    Severe persistent asthma without complication    Binge-eating disorder, severe    Moderate episode of recurrent major depressive disorder (HCC)    Obesity, Class III, BMI 40-49 9 (morbid obesity) ([place] [address] )    Insomnia    Vitamin D deficiency    Dizziness    Tension type headache    Mild intermittent asthma with exacerbation    COVID-19    Intertrigo    Depression    Seasonal allergies    Gastroesophageal reflux disease without esophagitis    Allergic rhinitis    Pre-operative cardiovascular examination     Past Medical History:   Diagnosis Date    Allergic rhinitis     Asthma     Chronic pain     COVID-19 3/26/2021    Cutaneous abscess of back excluding buttocks 4/6/2021    Depression     Insomnia     Major depressive disorder, recurrent episode, severe (HCC)     Seasonal allergies      Social History     Socioeconomic History    Marital status: Single     Spouse name: Not on file    Number of children: Not on file    Years of education: Not on file    Highest education level: Not on file   Occupational History    Not on file   Tobacco Use    Smoking status: Former Smoker    Smokeless tobacco: Never Used   Vaping Use    Vaping Use: Never used   Substance and Sexual Activity    Alcohol use: No    Drug use: No    Sexual activity: Yes     Partners: Male     Birth control/protection: None   Other Topics Concern    Not on file   Social History Narrative    Occupation    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[place] [address] Determinants of Health     Financial Resource Strain: Not on file   Food Insecurity: Not on file   Transportation Needs: Not on file   Physical Activity: Not on file   Stress: Not on file   Social Connections: Not on file   Intimate Partner Violence: Not on file   Housing Stability: Not on file      Family History   Problem Relation Age of Onset    Other Father         Cardiac disorder    Hypertension Father     Diabetes Maternal Grandmother     Diabetes Mother     Hypertension Mother     Cervical cancer Mother    Father's side has heart history, but she is not aware of what the problems are    Past Surgical History:   Procedure Laterality Date    ABCESS DRAINAGE      Incision and dranage of skin abscess    CHOLECYSTECTOMY      UPPER GASTROINTESTINAL ENDOSCOPY         Current Outpatient Medications:     albuterol (2 5 mg/3 mL) 0 083 % nebulizer solution, INHALE 1 VIAL VIA NEBULIZER EVERY 4 HOURS AS NEEDED, Disp: , Rfl: 1    ergocalciferol (VITAMIN D2) 50,000 units, Take 1 capsule (50,000 Units total) by mouth 2 (two) times a week with meals, Disp: 8 capsule, Rfl: 1    Etonogestrel (NEXPLANON) 68 MG IMPL, Inject under the skin , Disp: , Rfl:     fluticasone-vilanterol (Breo Ellipta) 200-25 MCG/INH inhaler, Inhale 1 puff daily Rinse mouth after use , Disp: 180 blister, Rfl: 0    montelukast (SINGULAIR) 10 mg tablet, Take 1 tablet (10 mg total) by mouth daily at bedtime, Disp: 90 tablet, Rfl: 1    Multiple Vitamin (multivitamin) capsule, Take 1 capsule by mouth daily, Disp: , Rfl:     fexofenadine (ALLEGRA) 180 MG tablet, Take 1 tablet (180 mg total) by mouth daily (Patient not taking: Reported on 1/27/2022 ), Disp: 90 tablet, Rfl: 0    ipratropium (ATROVENT) 0 02 % nebulizer solution, Take 2 5 mL (0 5 mg total) by nebulization 3 (three) times a day (Patient not taking: Reported on 11/9/2021 ), Disp: 225 mL, Rfl: 0    nystatin (MYCOSTATIN) powder, Apply topically 2 (two) times a day (Patient not taking: Reported on 1/27/2022 ), Disp: 60 g, Rfl: 0  Allergies   Allergen Reactions    Ambrosia Artemisiifolia (Ragweed) Skin Test     Dust Mite Extract     Pollen Extract      Vitals:    01/27/22 1320   BP: 128/76   Pulse: 72   Weight: (!) 195 kg (430 lb)   Height: 5' 4" (1 626 m)       Labs:  Orders Only on 12/28/2021   Component Date Value    SARS-CoV-2 12/28/2021 Positive*    INFLUENZA A PCR 12/28/2021 Negative     INFLUENZA B PCR 12/28/2021 Negative    Appointment on 12/04/2021   Component Date Value    A  ALTERNATA 12/04/2021 <0 10     A  FUMIGATUS 12/04/2021 <0 10     Bermuda Grass 12/04/2021 0 19*    Box Elder  12/04/2021 0 33*    Cat Epithellium-Dander 12/04/2021 5 19*    C  HERBARUM 12/04/2021 <0 10     Cockroach 12/04/2021 7 94*    Common Silver Lelia Narrow 12/04/2021 5 87*    [place] 12/04/2021 <0 10     D  farinae 12/04/2021 <0 10     D  pteronyssinus 12/04/2021 0 13*    Dog Dander 12/04/2021 1 58*    Elm IgE 12/04/2021 0 11*    [place] Tree 12/04/2021 <0 10     Mugwort 12/04/2021 4 93*    Delphia Tree 12/04/2021 <0 10     Oak 12/04/2021 4 43*    P CHRYSOGENUM 12/04/2021 <0 10     Rough Pigweed  IgE 12/04/2021 <0 10     Common Ragweed 12/04/2021 12 80*    Sheep Hannah IgE 12/04/2021 0 13*    Plato Tree 12/04/2021 0 40*    Jacky Grass 12/04/2021 54 30*    Fairton Tree 12/04/2021 0 84*    White Rodrigue Tree 12/04/2021 1 06*    IgE 12/04/2021 249*  Allergen Comment 12/04/2021 See Below     MOUSE URINE 12/04/2021 0 11*   Appointment on 11/05/2021   Component Date Value    Vit D, 25-Hydroxy 11/05/2021 15 7*    Cholesterol 11/05/2021 144     Triglycerides 11/05/2021 57     HDL, Direct 11/05/2021 63     LDL Calculated 11/05/2021 70     Non-HDL-Chol (CHOL-HDL) 11/05/2021 81     TSH 3RD GENERATON 11/05/2021 0 759     Sodium 11/05/2021 139     Potassium 11/05/2021 4 4     Chloride 11/05/2021 103     CO2 11/05/2021 30     ANION GAP 11/05/2021 6     BUN 11/05/2021 16     Creatinine 11/05/2021 0 76     Glucose, Fasting 11/05/2021 88     Calcium 11/05/2021 8 4     Corrected Calcium 11/05/2021 9 0     AST 11/05/2021 8     ALT 11/05/2021 30     Alkaline Phosphatase 11/05/2021 68     Total Protein 11/05/2021 7 6     Albumin 11/05/2021 3 2*    Total Bilirubin 11/05/2021 0 57     eGFR 11/05/2021 101    Admission on 10/22/2021, Discharged on 10/24/2021   Component Date Value    WBC 10/22/2021 10 15     RBC 10/22/2021 3 69*    Hemoglobin 10/22/2021 11 5     Hematocrit 10/22/2021 35 8     MCV 10/22/2021 97     MCH 10/22/2021 31 2     MCHC 10/22/2021 32 1     RDW 10/22/2021 11 8     MPV 10/22/2021 10 0     Platelets 61/44/9861 236     Neutrophils Relative 10/22/2021 75     Immat GRANS % 10/22/2021 0     Lymphocytes Relative 10/22/2021 17     Monocytes Relative 10/22/2021 7     Eosinophils Relative 10/22/2021 1     Basophils Relative 10/22/2021 0     Neutrophils Absolute 10/22/2021 7 58     Immature Grans Absolute 10/22/2021 0 02     Lymphocytes Absolute 10/22/2021 1 75     Monocytes Absolute 10/22/2021 0 67     Eosinophils Absolute 10/22/2021 0 11     Basophils Absolute 10/22/2021 0 02     Sodium 10/22/2021 140     Potassium 10/22/2021 3 9     Chloride 10/22/2021 105     CO2 10/22/2021 28     ANION GAP 10/22/2021 7     BUN 10/22/2021 13     Creatinine 10/22/2021 0 65     Glucose 10/22/2021 95     Calcium 10/22/2021 8 2*  AST 10/22/2021 10*    ALT 10/22/2021 13     Alkaline Phosphatase 10/22/2021 46 6     Total Protein 10/22/2021 7 1     Albumin 10/22/2021 3 5     Total Bilirubin 10/22/2021 0 41     eGFR 10/22/2021 115     BNP 10/22/2021 66 6     Troponin I 10/22/2021 <0 03     LACTIC ACID 10/22/2021 1 0     SARS-CoV-2 10/22/2021 Negative     INFLUENZA A PCR 10/22/2021 Negative     INFLUENZA B PCR 10/22/2021 Negative     RSV PCR 10/22/2021 Negative     Sputum Culture 10/23/2021 4+ Growth of      Gram Stain Result 10/23/2021 1+ Polys*    Gram Stain Result 10/23/2021 2+ Gram positive cocci in pairs*    Gram Stain Result 10/23/2021 1+ Gram negative rods*    Gram Stain Result 10/23/2021 No Epithelial cells seen*    Sodium 10/23/2021 138     Potassium 10/23/2021 4 4     Chloride 10/23/2021 105     CO2 10/23/2021 25     ANION GAP 10/23/2021 8     BUN 10/23/2021 10     Creatinine 10/23/2021 0 55     Glucose 10/23/2021 158*    Glucose, Fasting 10/23/2021 158*    Calcium 10/23/2021 8 2*    eGFR 10/23/2021 121     WBC 10/23/2021 9 33     RBC 10/23/2021 3 65*    Hemoglobin 10/23/2021 11 3*    Hematocrit 10/23/2021 35 2     MCV 10/23/2021 96     MCH 10/23/2021 31 0     MCHC 10/23/2021 32 1     RDW 10/23/2021 11 9     MPV 10/23/2021 10 7     Platelets 52/92/4977 246     Neutrophils Relative 10/23/2021 89*    Immat GRANS % 10/23/2021 0     Lymphocytes Relative 10/23/2021 8*    Monocytes Relative 10/23/2021 3*    Eosinophils Relative 10/23/2021 0     Basophils Relative 10/23/2021 0     Neutrophils Absolute 10/23/2021 8 29*    Immature Grans Absolute 10/23/2021 0 03     Lymphocytes Absolute 10/23/2021 0 76     Monocytes Absolute 10/23/2021 0 24     Eosinophils Absolute 10/23/2021 0 00     Basophils Absolute 10/23/2021 0 01     Procalcitonin 10/23/2021 <0 05     Procalcitonin 10/24/2021 <0 05     Ventricular Rate 10/22/2021 90     Atrial Rate 10/22/2021 90     SD Interval 10/22/2021 155     QRSD Interval 10/22/2021 87     QT Interval 10/22/2021 371     QTC Interval 10/22/2021 454     P Axis 10/22/2021 55     QRS Axis 10/22/2021 15     T Wave Axis 10/22/2021 -12    Admission on 10/22/2021, Discharged on 10/22/2021   Component Date Value    WBC 10/22/2021 6 79     RBC 10/22/2021 3 81     Hemoglobin 10/22/2021 11 7     Hematocrit 10/22/2021 36 6     MCV 10/22/2021 96     MCH 10/22/2021 30 7     MCHC 10/22/2021 32 0     RDW 10/22/2021 11 9     MPV 10/22/2021 10 6     Platelets 50/43/3599 233     Sodium 10/22/2021 137     Potassium 10/22/2021 3 8     Chloride 10/22/2021 104     CO2 10/22/2021 26     ANION GAP 10/22/2021 7     BUN 10/22/2021 15     Creatinine 10/22/2021 0 67     Glucose 10/22/2021 100     Calcium 10/22/2021 8 2*    AST 10/22/2021 12*    ALT 10/22/2021 14     Alkaline Phosphatase 10/22/2021 62 2     Total Protein 10/22/2021 7 0     Albumin 10/22/2021 3 5     Total Bilirubin 10/22/2021 0 48     eGFR 10/22/2021 113     Magnesium 10/22/2021 1 8     Preg, Serum 10/22/2021 Negative     Ventricular Rate 10/22/2021 79     Atrial Rate 10/22/2021 80     FL Interval 10/22/2021 154     QRSD Interval 10/22/2021 97     QT Interval 10/22/2021 398     QTC Interval 10/22/2021 457     P Axis 10/22/2021 42     QRS Axis 10/22/2021 1     T Wave Axis 10/22/2021 -22      Lab Results   Component Value Date    TRIG 57 11/05/2021    HDL 63 11/05/2021     Imaging: No results found  Review of Systems:  Review of Systems   Constitutional: Negative for activity change, appetite change, chills, diaphoresis, fatigue and unexpected weight change  HENT: Negative for hearing loss, nosebleeds and sore throat  Eyes: Negative for photophobia and visual disturbance  Respiratory: Positive for shortness of breath  Negative for cough, chest tightness and wheezing  Cardiovascular: Negative for chest pain, palpitations and leg swelling     Gastrointestinal: Negative for abdominal pain, diarrhea, nausea and vomiting  Endocrine: Negative for polyuria  Genitourinary: Negative for dysuria, frequency and hematuria  Musculoskeletal: Negative for arthralgias, back pain, gait problem and neck pain  Skin: Negative for pallor and rash  Neurological: Negative for dizziness, syncope and headaches  Hematological: Does not bruise/bleed easily  Psychiatric/Behavioral: Negative for behavioral problems and confusion  Physical Exam:  Physical Exam  Vitals reviewed  Constitutional:       Appearance: She is well-developed  She is not diaphoretic  HENT:      Head: Normocephalic and atraumatic  Nose: Nose normal    Eyes:      General: No scleral icterus  Pupils: Pupils are equal, round, and reactive to light  Neck:      Vascular: No JVD  Cardiovascular:      Rate and Rhythm: Normal rate and regular rhythm  Heart sounds: Normal heart sounds  No murmur heard  No friction rub  No gallop  Pulmonary:      Effort: Pulmonary effort is normal  No respiratory distress  Breath sounds: Normal breath sounds  No wheezing or rales  Abdominal:      General: Bowel sounds are normal  There is no distension  Palpations: Abdomen is soft  Tenderness: There is no abdominal tenderness  Musculoskeletal:         General: No deformity  Normal range of motion  Cervical back: Normal range of motion and neck supple  Skin:     General: Skin is warm and dry  Findings: No rash  Neurological:      Mental Status: She is alert and oriented to person, place, and time  Cranial Nerves: No cranial nerve deficit  Psychiatric:         Behavior: Behavior normal        Blood pressure 128/76, pulse 72, height 5' 4" (1 626 m), weight (!) 195 kg (430 lb), currently breastfeeding      EKG:  Normal sinus rhythm  Normal ECG    Discussion/Summary:  Pre-op cardiac eval: with no significant risk factors for CAD and no active symptoms, exam, or EKG findings suggesting active ischemia, arrhythmia, or CHF  Proceed with planned intermediate risk bariatric surgery without any further cardiac testing

## 2022-01-30 ENCOUNTER — IMMUNIZATIONS (OUTPATIENT)
Dept: FAMILY MEDICINE CLINIC | Facility: HOSPITAL | Age: 37
End: 2022-01-30

## 2022-01-31 ENCOUNTER — HOSPITAL ENCOUNTER (OUTPATIENT)
Dept: PULMONOLOGY | Facility: HOSPITAL | Age: 37
Discharge: HOME | End: 2022-01-31
Attending: INTERNAL MEDICINE
Payer: COMMERCIAL

## 2022-01-31 DIAGNOSIS — J45.50 SEVERE PERSISTENT ASTHMA WITHOUT COMPLICATION: ICD-10-CM

## 2022-01-31 PROCEDURE — 94726 PLETHYSMOGRAPHY LUNG VOLUMES: CPT

## 2022-01-31 PROCEDURE — 94729 DIFFUSING CAPACITY: CPT

## 2022-01-31 PROCEDURE — 94729 DIFFUSING CAPACITY: CPT | Performed by: INTERNAL MEDICINE

## 2022-01-31 PROCEDURE — 94726 PLETHYSMOGRAPHY LUNG VOLUMES: CPT | Performed by: INTERNAL MEDICINE

## 2022-01-31 PROCEDURE — 94760 N-INVAS EAR/PLS OXIMETRY 1: CPT

## 2022-01-31 PROCEDURE — 94060 EVALUATION OF WHEEZING: CPT | Performed by: INTERNAL MEDICINE

## 2022-01-31 PROCEDURE — 94060 EVALUATION OF WHEEZING: CPT

## 2022-01-31 RX ORDER — ALBUTEROL SULFATE 2.5 MG/3ML
2.5 SOLUTION RESPIRATORY (INHALATION) ONCE
Status: COMPLETED | OUTPATIENT
Start: 2022-01-31 | End: 2022-01-31

## 2022-01-31 RX ADMIN — ALBUTEROL SULFATE 2.5 MG: 2.5 SOLUTION RESPIRATORY (INHALATION) at 08:49

## 2022-02-11 ENCOUNTER — PREP FOR PROCEDURE (OUTPATIENT)
Dept: BARIATRICS | Facility: CLINIC | Age: 37
End: 2022-02-11

## 2022-02-11 ENCOUNTER — OFFICE VISIT (OUTPATIENT)
Dept: BARIATRICS | Facility: CLINIC | Age: 37
End: 2022-02-11
Payer: COMMERCIAL

## 2022-02-11 VITALS
DIASTOLIC BLOOD PRESSURE: 80 MMHG | HEIGHT: 64 IN | HEART RATE: 100 BPM | SYSTOLIC BLOOD PRESSURE: 120 MMHG | BODY MASS INDEX: 50.02 KG/M2 | WEIGHT: 293 LBS | TEMPERATURE: 97.3 F

## 2022-02-11 DIAGNOSIS — K21.9 GASTROESOPHAGEAL REFLUX DISEASE WITHOUT ESOPHAGITIS: ICD-10-CM

## 2022-02-11 DIAGNOSIS — E66.01 MORBID (SEVERE) OBESITY DUE TO EXCESS CALORIES (HCC): Primary | ICD-10-CM

## 2022-02-11 DIAGNOSIS — J45.50 SEVERE PERSISTENT ASTHMA WITHOUT COMPLICATION: ICD-10-CM

## 2022-02-11 DIAGNOSIS — F33.1 MODERATE EPISODE OF RECURRENT MAJOR DEPRESSIVE DISORDER (HCC): ICD-10-CM

## 2022-02-11 DIAGNOSIS — G44.209 TENSION-TYPE HEADACHE, NOT INTRACTABLE, UNSPECIFIED CHRONICITY PATTERN: ICD-10-CM

## 2022-02-11 DIAGNOSIS — F33.42 RECURRENT MAJOR DEPRESSIVE DISORDER, IN FULL REMISSION (HCC): ICD-10-CM

## 2022-02-11 PROCEDURE — 3008F BODY MASS INDEX DOCD: CPT | Performed by: SURGERY

## 2022-02-11 PROCEDURE — 1036F TOBACCO NON-USER: CPT | Performed by: SURGERY

## 2022-02-11 PROCEDURE — 99214 OFFICE O/P EST MOD 30 MIN: CPT | Performed by: SURGERY

## 2022-02-11 NOTE — PROGRESS NOTES
BARIATRIC INITIAL CONSULT - BARIATRIC SURGERY    Geneva Sharma 39 y o  female MRN: [id number]  Unit/Bed#:  Encounter: [id number]      HPI:  Geneva Sharma is a 39 y o  female who presents with a longstanding history of morbid obesity and inability to sustain a meaningful weight loss  Here today to discuss bariatric options  Visit type: initial visit    Symptoms: inability to loss weight    Associated Symptoms: depressed mood and anxiety    Associated Conditions: abdominal obesity  Disease Complications: Severe Asthma  Weight Loss Interest: high  Previous Diet Trials: low calorie     Exercise Frequency:infrequency  Types of Exercise: walking         Review of Systems   All other systems reviewed and are negative        Historical Information   Past Medical History:   Diagnosis Date    Allergic rhinitis     Asthma     Chronic pain     COVID-19 3/26/2021    Cutaneous abscess of back excluding buttocks 4/6/2021    Depression     Insomnia     Major depressive disorder, recurrent episode, severe (HCC)     Seasonal allergies      Past Surgical History:   Procedure Laterality Date    ABCESS DRAINAGE      Incision and dranage of skin abscess    CHOLECYSTECTOMY      UPPER GASTROINTESTINAL ENDOSCOPY       Social History   Social History     Substance and Sexual Activity   Alcohol Use No     Social History     Substance and Sexual Activity   Drug Use No     Social History     Tobacco Use   Smoking Status Former Smoker   Smokeless Tobacco Never Used     Family History: non-contributory    Meds/Allergies   all medications and allergies reviewed  Allergies   Allergen Reactions    Ambrosia Artemisiifolia (Ragweed) Skin Test     Dust Mite Extract     Pollen Extract        Objective       Current Vitals:   Blood Pressure: 120/80 (02/11/22 0957)  Pulse: 100 (02/11/22 0957)  Temperature: (!) 97 3 °F (36 3 °C) (02/11/22 0957)  Temp Source: Tympanic (02/11/22 0957)  Height: 5' 4" (162 6 cm) (02/11/22 4445)  Weight - Scale: (!) 194 kg (427 lb 8 oz) (02/11/22 0952)        Invasive Devices  Report    None                 Physical Exam  Vitals reviewed  Constitutional:       General: She is not in acute distress  Appearance: She is well-developed  She is not diaphoretic  HENT:      Head: Normocephalic and atraumatic  Right Ear: External ear normal       Left Ear: External ear normal       Nose: Nose normal    Eyes:      General: No scleral icterus  Right eye: No discharge  Left eye: No discharge  Conjunctiva/sclera: Conjunctivae normal    Neurological:      Mental Status: She is alert and oriented to person, place, and time  Psychiatric:         Behavior: Behavior normal          Thought Content: Thought content normal          Judgment: Judgment normal          Lab Results: I have personally reviewed pertinent lab results  Imaging: I have personally reviewed pertinent reports  EKG, Pathology, and Other Studies: I have personally reviewed pertinent reports  Assessment/PLAN:    39 y o  female with a long standing h/o of obesity and inability to sustain any meaningful weight loss on her own despite several attempts  She started the process before in 2012 but then she got pregnant and the surgery had to be rescheduled  Tried again in 2017 and then she got cold feet  She is interested in the Laparoscopic Gastric bypass possible sleeve gastrectomy  Given her high BMI of 73, we will stage her and start with the sleeve gastrectomy and plan for the gastric bypass as a second stage  As a part of her pre op evaluation, she will be referred to a cardiologist and for a sleep evaluation and consult  She needs an EGD to evaluate the anatomy of her GI tract prior to the operation  I have spent over 30 minutes with her face to face in the office today discussing her options and details of the surgery   We have seen an animation of the surgery on the computer that illustrates how the operation is done and how the anatomy will be altered with the procedure  Over 50% of this was coordinating care  I have used the [facility] bariatric surgical risk/benefit calculator and we have discussed the results as part of the preop education  She was given the opportunity to ask questions and I have answered all of them  I have discussed and educated the patient with regards to the components of our multidisciplinary program and the importance of compliance and follow up in the post operative period  The patient was also instructed with regards to the importance of behavior modification, nutritional counseling, support meeting attendance and lifestyle changes that are important to ensure success  Although there is a great statistical chance of improvement or even resolution of most of her associated comorbidities, the results vary from patient to patient and they largely depend on her commitment and compliance  She needs to lose 45 lbs prior to the operation      We will stage her procedure with a sleeve first     Dario Kelsey MD  2/11/2022  10:15 AM

## 2022-03-09 NOTE — PROGRESS NOTES
Behavioral Health Follow Up Note:      No Required Weight Check:  Dr Mirtha Hickey    Starting weight 440 4  #  Today's weight 409 9   #  Surgery goal 396 4  #    Surgery month:  March/April  Surgery deadline: July 2022    Mental health and wellness - Successful in losing weight so far  Tracking food intake and paying attention  Is meal prepping  Stopped eating out  Staying away from the food at work  Staying away from trigger environments   Found things to do to distract her from head hunger        Eating behaviors - hydrating with water  Eating protein and reducing carbs  Found protein shakes that she likes ([employer])    Activity -  walking    Progress toward program requirements    Workflow:      Last item is her EGD on 4/20/2022    · Psych and/or D+A Clearance: n/a  · PCP Letter: 10/27/2021  · Support Group: 12/8/2021  · Surgeon Appt : 2/11/2022  · EGD : 4/20/2022  · Cardiac Risk Assessment: 1/27/2022  · Sleep Studies: n/a   · Bloodwork: 11/5/2021  (good for six months)       Goals:  Continue weight loss

## 2022-03-11 ENCOUNTER — OFFICE VISIT (OUTPATIENT)
Dept: BARIATRICS | Facility: CLINIC | Age: 37
End: 2022-03-11

## 2022-03-11 VITALS — WEIGHT: 293 LBS | BODY MASS INDEX: 70.36 KG/M2

## 2022-03-11 DIAGNOSIS — E66.01 OBESITY, CLASS III, BMI 40-49.9 (MORBID OBESITY) (HCC): Primary | ICD-10-CM

## 2022-03-11 PROCEDURE — RECHECK

## 2022-03-16 ENCOUNTER — OFFICE VISIT (OUTPATIENT)
Dept: PULMONOLOGY | Facility: CLINIC | Age: 37
End: 2022-03-16
Payer: COMMERCIAL

## 2022-03-16 VITALS
BODY MASS INDEX: 50.02 KG/M2 | HEIGHT: 64 IN | TEMPERATURE: 96.5 F | SYSTOLIC BLOOD PRESSURE: 126 MMHG | HEART RATE: 81 BPM | OXYGEN SATURATION: 93 % | DIASTOLIC BLOOD PRESSURE: 88 MMHG | WEIGHT: 293 LBS

## 2022-03-16 DIAGNOSIS — J45.50 SEVERE PERSISTENT ASTHMA WITHOUT COMPLICATION: Primary | ICD-10-CM

## 2022-03-16 DIAGNOSIS — J45.51 SEVERE PERSISTENT ASTHMA WITH ACUTE EXACERBATION: ICD-10-CM

## 2022-03-16 DIAGNOSIS — E66.01 MORBID (SEVERE) OBESITY DUE TO EXCESS CALORIES (HCC): ICD-10-CM

## 2022-03-16 DIAGNOSIS — K21.9 GASTROESOPHAGEAL REFLUX DISEASE WITHOUT ESOPHAGITIS: ICD-10-CM

## 2022-03-16 DIAGNOSIS — J30.9 ALLERGIC RHINITIS, UNSPECIFIED SEASONALITY, UNSPECIFIED TRIGGER: ICD-10-CM

## 2022-03-16 PROCEDURE — 3008F BODY MASS INDEX DOCD: CPT | Performed by: INTERNAL MEDICINE

## 2022-03-16 PROCEDURE — 1036F TOBACCO NON-USER: CPT | Performed by: INTERNAL MEDICINE

## 2022-03-16 PROCEDURE — 99214 OFFICE O/P EST MOD 30 MIN: CPT | Performed by: INTERNAL MEDICINE

## 2022-03-16 RX ORDER — FEXOFENADINE HCL AND PSEUDOEPHEDRINE HCI 180; 240 MG/1; MG/1
1 TABLET, EXTENDED RELEASE ORAL DAILY
Qty: 30 TABLET | Refills: 2 | Status: ON HOLD | OUTPATIENT
Start: 2022-03-16 | End: 2022-06-16

## 2022-03-16 NOTE — PROGRESS NOTES
Pulmonary Follow Up Note   Hannah Dsouza 39 y o  female MRN: [id number]  3/16/2022      Assessment:  1  Severe persistent asthma  · Cont Breo 200/25 1puff BID  · Cont Singulair 10mg daily  · Continue prn EDIE   · Hold on further immunologic therapies at this time given symptom improvements  · Check complete PFTs  · FLU vaccine 2021, COVID-19 x 3, pneumovax 2018  · Follow up in 3 months     2  Allergic rhinitis   · Numerous (+) NE RAST  · Continue singulair  · Add trial of nasal saline irrigation  · Add trial of Allegra-D   · Consider trials of Zyzal and/or nasal atrovent in the future     3  GERD - overall good control, continue dietary control, consider prn pepcid     4  Morbid obesity - per bariatric surgery     5  Restrictive airflow defect secondary #3    Plan:    Diagnoses and all orders for this visit:    Severe persistent asthma without complication  -     fluticasone-vilanterol (Breo Ellipta) 200-25 MCG/INH inhaler; Inhale 1 puff daily Rinse mouth after use  -     fexofenadine-pseudoephedrine (ALLEGRA-D 24) 180-240 MG per 24 hr tablet; Take 1 tablet by mouth daily    Allergic rhinitis, unspecified seasonality, unspecified trigger  -     fluticasone-vilanterol (Breo Ellipta) 200-25 MCG/INH inhaler; Inhale 1 puff daily Rinse mouth after use  -     fexofenadine-pseudoephedrine (ALLEGRA-D 24) 180-240 MG per 24 hr tablet; Take 1 tablet by mouth daily    Gastroesophageal reflux disease without esophagitis    Morbid (severe) obesity due to excess calories (HCC)    Severe persistent asthma with acute exacerbation  -     fluticasone-vilanterol (Breo Ellipta) 200-25 MCG/INH inhaler; Inhale 1 puff daily Rinse mouth after use  Other orders  -     Discontinue: fluticasone-vilanterol (Breo Ellipta) 200-25 MCG/INH inhaler; Inhale 1 puff daily Rinse mouth after use  Return in about 3 months (around 6/16/2022)      History of Present Illness   HPI:  Hannah Dsouza is a 39 y o  female who who presented in Nov 2021 for asthma evaluation  She reported she has had significant asthma since birth and had numerous exacerbations and needed regular nebulizer treatments  She improved through her teenage years and 19's  She noted increased asthma symptoms over the prior 3 years  She reported having intermittent allergy symptoms and had previously significant allergy test results - trees, grasses, dust mites, animals  She had brief admission just prior to evaluation  She was last seen in Dec 2021 with plans to continue Breo and add trial of singulair  She presents today for follow up      She had COVID-19 in late Dec but did well and reported recovering w/o asthma exacerbation  She reports asthma symptoms under good control with no/rare EDIE use  Remains on Breo daily  She does report increased allergy symptoms with rhinorrhea and nasal congestion, post nasal drainage symptoms  She denied nocturnal dyspnea, no rashes, no wheeze  She has continued to lose weight and is hopeful for bariatric surgery in May 2022  Review of Systems   Constitutional: Positive for activity change  Negative for appetite change, fatigue and fever  HENT: Positive for postnasal drip, rhinorrhea and sneezing  Negative for ear pain, mouth sores, sore throat and trouble swallowing  Respiratory: Positive for cough  Negative for chest tightness, shortness of breath and wheezing  Cardiovascular: Negative for chest pain  Gastrointestinal: Negative for abdominal pain, nausea and vomiting  Endocrine: Negative for cold intolerance and heat intolerance  Musculoskeletal: Positive for arthralgias  Negative for back pain and myalgias  Allergic/Immunologic: Positive for environmental allergies  Neurological: Negative for light-headedness and headaches  Hematological: Negative for adenopathy  Psychiatric/Behavioral: Negative for sleep disturbance  The patient is not nervous/anxious          Historical Information   Past Medical History: Diagnosis Date    Allergic rhinitis     Asthma     Chronic pain     COVID-19 3/26/2021    Cutaneous abscess of back excluding buttocks 4/6/2021    Depression     Insomnia     Major depressive disorder, recurrent episode, severe (HCC)     Seasonal allergies      Past Surgical History:   Procedure Laterality Date    ABCESS DRAINAGE      Incision and dranage of skin abscess    CHOLECYSTECTOMY      UPPER GASTROINTESTINAL ENDOSCOPY       Family History   Problem Relation Age of Onset    Other Father         Cardiac disorder    Hypertension Father     Diabetes Maternal Grandmother     Diabetes Mother     Hypertension Mother     Cervical cancer Mother          Meds/Allergies     Current Outpatient Medications:     albuterol (2 5 mg/3 mL) 0 083 % nebulizer solution, INHALE 1 VIAL VIA NEBULIZER EVERY 4 HOURS AS NEEDED, Disp: , Rfl: 1    Etonogestrel (NEXPLANON) 68 MG IMPL, Inject under the skin , Disp: , Rfl:     fluticasone-vilanterol (Breo Ellipta) 200-25 MCG/INH inhaler, Inhale 1 puff daily Rinse mouth after use , Disp: 180 blister, Rfl: 3    montelukast (SINGULAIR) 10 mg tablet, Take 1 tablet (10 mg total) by mouth daily at bedtime, Disp: 90 tablet, Rfl: 1    Multiple Vitamin (multivitamin) capsule, Take 1 capsule by mouth daily, Disp: , Rfl:     ergocalciferol (VITAMIN D2) 50,000 units, Take 1 capsule (50,000 Units total) by mouth 2 (two) times a week with meals, Disp: 8 capsule, Rfl: 1    fexofenadine-pseudoephedrine (ALLEGRA-D 24) 180-240 MG per 24 hr tablet, Take 1 tablet by mouth daily, Disp: 30 tablet, Rfl: 2    fluticasone-vilanterol (Breo Ellipta) 200-25 MCG/INH inhaler, Inhale 1 puff daily Rinse mouth after use , Disp: 180 blister, Rfl: 0    ipratropium (ATROVENT) 0 02 % nebulizer solution, Take 2 5 mL (0 5 mg total) by nebulization 3 (three) times a day (Patient not taking: Reported on 11/9/2021 ), Disp: 225 mL, Rfl: 0    nystatin (MYCOSTATIN) powder, Apply topically 2 (two) times a day (Patient not taking: Reported on 1/27/2022 ), Disp: 60 g, Rfl: 0  Allergies   Allergen Reactions    Ambrosia Artemisiifolia (Ragweed) Skin Test     Dust Mite Extract     Pollen Extract        Vitals: Blood pressure 126/88, pulse 81, temperature (!) 96 5 °F (35 8 °C), temperature source Tympanic, height 5' 4" (1 626 m), weight (!) 185 kg (408 lb), SpO2 93 %, currently breastfeeding  Body mass index is 70 03 kg/m²  Oxygen Therapy  SpO2: 93 %  Oxygen Therapy: None (Room air)      Physical Exam  Physical Exam  Vitals reviewed  Constitutional:       General: She is not in acute distress  Appearance: Normal appearance  She is well-developed  She is obese  She is not ill-appearing, toxic-appearing or diaphoretic  HENT:      Head: Normocephalic and atraumatic  Right Ear: External ear normal       Left Ear: External ear normal       Nose: Congestion and rhinorrhea present  Comments: Noted nonpurulent rhinorrhea, nasal mucosa with mild nodularity and bogginess     Mouth/Throat:      Mouth: Mucous membranes are moist       Pharynx: Oropharynx is clear  No oropharyngeal exudate  Eyes:      General: No scleral icterus  Right eye: No discharge  Left eye: No discharge  Conjunctiva/sclera: Conjunctivae normal       Pupils: Pupils are equal, round, and reactive to light  Neck:      Vascular: No JVD  Trachea: No tracheal deviation  Cardiovascular:      Rate and Rhythm: Normal rate and regular rhythm  Heart sounds: Normal heart sounds  No murmur heard  No gallop  Pulmonary:      Effort: Pulmonary effort is normal  No respiratory distress  Breath sounds: Normal breath sounds  No stridor  No wheezing, rhonchi or rales  Abdominal:      General: Bowel sounds are normal  There is no distension  Palpations: Abdomen is soft  Tenderness: There is no abdominal tenderness  There is no guarding or rebound  Musculoskeletal:         General: No deformity  Right lower leg: No edema  Left lower leg: No edema  Lymphadenopathy:      Cervical: No cervical adenopathy  Skin:     General: Skin is warm and dry  Coloration: Skin is not jaundiced  Findings: No erythema or rash  Neurological:      General: No focal deficit present  Mental Status: She is alert and oriented to person, place, and time  Mental status is at baseline  Psychiatric:         Mood and Affect: Mood normal          Behavior: Behavior normal          Thought Content: Thought content normal          Labs: I have personally reviewed pertinent lab results  Lab Results   Component Value Date    WBC 9 33 10/23/2021    HGB 11 3 (L) 10/23/2021    HCT 35 2 10/23/2021    MCV 96 10/23/2021     10/23/2021     Lab Results   Component Value Date    GLUCOSE 89 2015    CALCIUM 8 4 2021     2015    K 4 4 2021    CO2 30 2021     2021    BUN 16 2021    CREATININE 0 76 2021     Lab Results   Component Value Date     (H) 2021     Lab Results   Component Value Date    ALT 30 2021    AST 8 2021    ALKPHOS 68 2021    BILITOT 0 4 2015     COVID-19 (+) 2021    NE RAST 2021 - numerous (+) grass, cat/dog dander, molds, ragweed, claudia grass, Total IgE 249     Abs Eos ranging 110-250 from [id number]     Imaging and other studies: No new images  CXR - 10/22/21 - under penetrated film, no focal infiltrates, no effusions, no PTX     Pulmonary function testin2021 - Spirometry - Ratio 77%, FVC 2 47L (67%), FEV1 1 90L (61%) - moderate restrictive airflow defect    Bakari Villegas, , FACP  [facility]    Answers for HPI/ROS submitted by the patient on 3/15/2022  Do you have shortness of breath that occurs with effort or exertion?: No  Do you have ear congestion?: No  Do you have heartburn?: No  Do you have fatigue?: No  Do you have nasal congestion?: Yes  Do you have shortness of breath when lying flat?: No  Do you have shortness of breath when you wake up?: No  Do you have sweats?: No  Have you experienced weight loss?: Yes

## 2022-03-16 NOTE — PATIENT INSTRUCTIONS
· Continue Breo  · Start Allegra D 1 pill daily  · Continue Singulair  · Add Neilmed sinus rinses  · Continue Nasocort sinus rinse

## 2022-04-20 ENCOUNTER — ANESTHESIA (OUTPATIENT)
Dept: GASTROENTEROLOGY | Facility: HOSPITAL | Age: 37
End: 2022-04-20

## 2022-04-20 ENCOUNTER — HOSPITAL ENCOUNTER (OUTPATIENT)
Dept: GASTROENTEROLOGY | Facility: HOSPITAL | Age: 37
Setting detail: OUTPATIENT SURGERY
Discharge: HOME | End: 2022-04-20
Attending: SURGERY
Payer: COMMERCIAL

## 2022-04-20 ENCOUNTER — ANESTHESIA EVENT (OUTPATIENT)
Dept: GASTROENTEROLOGY | Facility: HOSPITAL | Age: 37
End: 2022-04-20

## 2022-04-20 VITALS
DIASTOLIC BLOOD PRESSURE: 58 MMHG | SYSTOLIC BLOOD PRESSURE: 116 MMHG | TEMPERATURE: 97.7 F | HEART RATE: 72 BPM | OXYGEN SATURATION: 100 % | RESPIRATION RATE: 18 BRPM

## 2022-04-20 DIAGNOSIS — E66.01 MORBID (SEVERE) OBESITY DUE TO EXCESS CALORIES (HCC): ICD-10-CM

## 2022-04-20 LAB
EXT PREGNANCY TEST URINE: NEGATIVE
EXT. CONTROL: NORMAL

## 2022-04-20 PROCEDURE — 88305 TISSUE EXAM BY PATHOLOGIST: CPT | Performed by: PATHOLOGY

## 2022-04-20 PROCEDURE — 43239 EGD BIOPSY SINGLE/MULTIPLE: CPT | Performed by: SURGERY

## 2022-04-20 PROCEDURE — 81025 URINE PREGNANCY TEST: CPT | Performed by: ANESTHESIOLOGY

## 2022-04-20 RX ORDER — SODIUM CHLORIDE 9 MG/ML
125 INJECTION, SOLUTION INTRAVENOUS CONTINUOUS
Status: DISCONTINUED | OUTPATIENT
Start: 2022-04-20 | End: 2022-04-24 | Stop reason: HOSPADM

## 2022-04-20 RX ORDER — PROPOFOL 10 MG/ML
INJECTION, EMULSION INTRAVENOUS AS NEEDED
Status: DISCONTINUED | OUTPATIENT
Start: 2022-04-20 | End: 2022-04-20

## 2022-04-20 RX ADMIN — PROPOFOL 30 MG: 10 INJECTION, EMULSION INTRAVENOUS at 12:16

## 2022-04-20 RX ADMIN — SODIUM CHLORIDE 125 ML/HR: 0.9 INJECTION, SOLUTION INTRAVENOUS at 11:39

## 2022-04-20 RX ADMIN — PROPOFOL 30 MG: 10 INJECTION, EMULSION INTRAVENOUS at 12:18

## 2022-04-20 RX ADMIN — PROPOFOL 170 MG: 10 INJECTION, EMULSION INTRAVENOUS at 12:12

## 2022-04-20 RX ADMIN — TOPICAL ANESTHETIC 1 SPRAY: 200 SPRAY DENTAL; PERIODONTAL at 12:11

## 2022-04-20 NOTE — ANESTHESIA PREPROCEDURE EVALUATION
Procedure:  EGD    (+) asthma  States well controlled  Not wheezing on exam today  (+) BMI 70  Physical Exam    Airway    Mallampati score: I  TM Distance: >3 FB  Neck ROM: full     Dental       Cardiovascular  Rhythm: regular, Rate: normal,     Pulmonary  Breath sounds clear to auscultation,     Other Findings        Anesthesia Plan  ASA Score- 3     Anesthesia Type- general with ASA Monitors  Additional Monitors:   Airway Plan:           Plan Factors-Exercise tolerance (METS): >4 METS  Chart reviewed  Existing labs reviewed  Patient is not a current smoker  Induction- intravenous  Postoperative Plan-     Informed Consent- Anesthetic plan and risks discussed with patient

## 2022-04-20 NOTE — H&P
This is a 39 y o  female with a history of morbid obesity and There is no height or weight on file to calculate BMI  Here for an EGD to evaluate the anatomy of the GI tract  Physical Exam    /58   Pulse 64   Temp 97 7 °F (36 5 °C) (Temporal)   Resp 16   SpO2 97%    AAOx3  RRR  CTA B  Abdomen obese  Benign  A/P:    This is a 39 y o  female with a history of morbid obesity and There is no height or weight on file to calculate BMI       Will proceed with the EGD and biopsies        Berna Breen MD  04/20/22  12:09 PM

## 2022-04-20 NOTE — ANESTHESIA POSTPROCEDURE EVALUATION
Patient:  Gerson Trujillo  Date of Service:  9/9/2021  Length of Visit:  20 minutes    Chief Complaint:  The patient is a 48 year old male seen for follow-up of alcohol, cannabis, cocaine, and opioid use disorders, anxiety and mood disorders.  Patient states, \"I am much better. I think that the medications work....\"    History of Present Illness:  Gerson reports that he is doing much better with keeping his emotions in check since he was last seen in the clinic.  He states that he is currently working with his younger brother who he describes as a \"younger version of myself 8 years ago.\" He states that he clashes with his brother quite a bit but he is able to keep his cool and walk away if necessary to deescalate potentially explosive situations.  He states that he is also getting along with Justin, his girlfriend, and gets to spend more time with his daughter Alma as a result.  Reports that anxiety is better controlled.  He states that it is easier for him to think through decisions before he acts.  He states that he has better concentration, and is more efficient at work.  He reports less frequent verbal outbursts and denies mood swings.  He denies depression, hopelessness, SI or HI.  Gerson denies use of illicit drugs since he was last seen, apart from marijuana.  He states that he typically takes a puff or 2 of marijuana at night to help him sleep.  He states that he has also cut down on his alcohol consumption.  States that he typically drinks alcohol over the weekend, 2 beers plus/minus a shot of liquor per day, on Friday, Saturday and Sunday.  He states that he is pretty happy with his life at the moment.  He states that he he is working on getting back on track with outpatient therapy visits and attends a 12 step meeting whenever he is able.  He reports strict medication adherence.  He denies medication side effects.  Gerson states that he has been able to work out a long-term deal with the owner of the  Post-Op Assessment Note    CV Status:  Stable    Pain management: adequate     Mental Status:  Alert and awake   Hydration Status:  Euvolemic   PONV Controlled:  Controlled   Airway Patency:  Patent      Post Op Vitals Reviewed: Yes      Staff: Anesthesiologist         No complications documented      BP      Temp      Pulse     Resp      SpO2      /58   Pulse 72   Temp 97 7 °F (36 5 °C) (Temporal)   Resp 18   SpO2 100% [facility] where he stays.  He states that he is therefore no longer stressed about housing.    Psychiatric Medications:  Buspirone 15 mg 3 times  daily, Gabapentin 300 mg 3 times daily and 600 mg daily as needed for agitation/severe anxiety, Concerta 27 mg daily     Medical Review of Systems:  The patient has no somatic complaint        Laboratory results:  UDS on 9/9/2021 -positive for THC  Breathalyzer on 09/09/2021 - 0.000        Mental Status Examination: Gerson Trujillo is a 48-year-old  male who looks his stated age. He is appropriately dressed, and well groomed. He is relatively calm,  cooperative, and appropriately interactive  Mood: Euthymic  Affect:  Appropriate, full range of affect  Speech: Normal rate, rhythm, and volume  Thought Process: Linear and logical  Thought Content:  Negative for SI, HI, paranoia and delusions  Perception: No evidence of perceptual abnormalities  Consciousness and Cognition: Alert and oriented ×3. Attention/concentration: Good. Memory: Grossly intact  Insight: Fair  Judgment: Fair     Diagnosis:    Generalized anxiety disorder  ADHD, predominantly hyperactive impulsive type  PTSD  Alcohol use disorder, severe  Cannabis use disorder, moderate to severe  Opioid use disorder, severe,  in sustained remission  Cocaine use disorder, severe, in sustained remission    Assessment/ Plan:  The patient reports that he is less anxious, less impulsive, and less reactive to external triggers.  Objectively, he presents with normal mental status exam.  He tolerates all psychotropic medications and finds them helpful.  He reports moderated use of alcohol and daily marijuana use which he believes does not impair his functioning.  I recommend abstinence from all mood altering substances though.  Continue current psychotropic medication regimen.  Continue outpatient therapy.  RTC in 2 months.    Dia Molina MD  9/26/2021

## 2022-04-26 NOTE — PROGRESS NOTES
Bariatric Nutrition Follow-Up Note    Type of surgery    Preop  Surgery Date: TBD  Surgeon: Dr Cira Whelan  39 y o   female     Wt with BMI of 25: 148 2lbs  Pre-Op Excess Wt: 292 2lbs  Wt (!) 176 kg (388 lb 3 2 oz)   BMI 66 63 kg/m²    Pt has exceeded per pre-operative goal weight  Pre-op weight loss goals:  5% wt loss=22#=Goal of 418  4# to schedule surgery    10% wt loss=44#=Goal of 396 4# by day of surgery    Jordan Metcalf Equation:     Weight maintenance= 3207 kcal/day  Estimated calories for weight loss 1248-4019 kcal/day ( 1-2# per wk wt loss - sedentary )  Estimated protein needs 67 4-80 8 g/day (1 0-1 2 gms/kg IBW )   Estimated fluid needs 2616-0159 ml/day (30-35 ml/kg IBW )      Review of History and Medications   Past Medical History:   Diagnosis Date    Allergic rhinitis     Asthma     Chronic pain     COVID-19 3/26/2021    Cutaneous abscess of back excluding buttocks 4/6/2021    Depression     Insomnia     Major depressive disorder, recurrent episode, severe (HCC)     Seasonal allergies      Past Surgical History:   Procedure Laterality Date    ABCESS DRAINAGE      Incision and dranage of skin abscess    CHOLECYSTECTOMY      UPPER GASTROINTESTINAL ENDOSCOPY       Social History     Socioeconomic History    Marital status: Single     Spouse name: Not on file    Number of children: Not on file    Years of education: Not on file    Highest education level: Not on file   Occupational History    Not on file   Tobacco Use    Smoking status: Former Smoker    Smokeless tobacco: Never Used   Vaping Use    Vaping Use: Never used   Substance and Sexual Activity    Alcohol use: No    Drug use: No    Sexual activity: Yes     Partners: Male     Birth control/protection: None   Other Topics Concern    Not on file   Social History Narrative    Occupation    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[place] [employer]     Financial Resource Strain: Not on file   Food Insecurity: Not on file   Transportation Needs: Not on file   Physical Activity: Not on file   Stress: Not on file   Social Connections: Not on file   Intimate Partner Violence: Not on file   Housing Stability: Not on file       Current Outpatient Medications:     albuterol (2 5 mg/3 mL) 0 083 % nebulizer solution, INHALE 1 VIAL VIA NEBULIZER EVERY 4 HOURS AS NEEDED, Disp: , Rfl: 1    ergocalciferol (VITAMIN D2) 50,000 units, Take 1 capsule (50,000 Units total) by mouth 2 (two) times a week with meals, Disp: 8 capsule, Rfl: 1    Etonogestrel (NEXPLANON) 68 MG IMPL, Inject under the skin , Disp: , Rfl:     fexofenadine-pseudoephedrine (ALLEGRA-D 24) 180-240 MG per 24 hr tablet, Take 1 tablet by mouth daily, Disp: 30 tablet, Rfl: 2    fluticasone-vilanterol (Breo Ellipta) 200-25 MCG/INH inhaler, Inhale 1 puff daily Rinse mouth after use , Disp: 180 blister, Rfl: 0    fluticasone-vilanterol (Breo Ellipta) 200-25 MCG/INH inhaler, Inhale 1 puff daily Rinse mouth after use , Disp: 180 blister, Rfl: 3    ipratropium (ATROVENT) 0 02 % nebulizer solution, Take 2 5 mL (0 5 mg total) by nebulization 3 (three) times a day (Patient not taking: Reported on 11/9/2021 ), Disp: 225 mL, Rfl: 0    montelukast (SINGULAIR) 10 mg tablet, Take 1 tablet (10 mg total) by mouth daily at bedtime, Disp: 90 tablet, Rfl: 1    Multiple Vitamin (multivitamin) capsule, Take 1 capsule by mouth daily, Disp: , Rfl:     nystatin (MYCOSTATIN) powder, Apply topically 2 (two) times a day (Patient not taking: Reported on 1/27/2022 ), Disp: 60 g, Rfl: 0     Food Intake and Lifestyle Assessment   Food Intake Assessment completed via usual diet recall  Breakfast: meal replacement protein drink  Snack: none   Lunch: meal replacement protein drink  Snack: none  Dinner: 6 oz protein: ground turkey/ground chicken/fish, 1/2 cup veggies  Snack: none  Beverage intake: water, raw vegetable juice  Protein supplement: Likes [employer]  Estimated protein intake per day: 60-80g  Estimated fluid intake per day: 2-3 16 oz alan, 32 oz juice=64-80 oz/day  Meals eaten away from home: usually orders take-out for dinner  Just recently started meal prepping  None currently  Typical meal pattern: 3 meals per day and 0 snacks per day  Eating Behaviors: Consumption of high calorie/ high fat foods, Consumption of high calorie beverages, Large portion sizes, Frequent snacking/ grazing, Mindless eating  Craves salty foods  Large portions  Boredom eating/grazing  All resolved at current time  Pt is measuring and decreasing her portions, using two meal replacement drinks per day, and eating protein with each meal   Pt is doing well with the 30/60 rule and getting about a gallon of water during her day  Food allergies or intolerances: NKFA  Allergies   Allergen Reactions    Ambrosia Artemisiifolia (Ragweed) Skin Test     Dust Mite Extract     Pollen Extract      Cultural or Yarsanism considerations:     Physical Assessment  Physical Activity  Types of exercise: walking at work, mostly a desk job  Lives on 3rd floor at home  Pt is continuing to increase her daily walking  Current physical limitations: knee pain, some sciatic pain    Psychosocial Assessment   Support systems: lives with spouse and 6 yo daughter  Socioeconomic factors: works FT   Pt states her family is not supportive of bariatric surgery and has criticized her weight loss efforts in the past     Nutrition Diagnosis-Continued  Diagnosis: Overweight / Obesity (NC-3 3), Excessive energy intake (NI-1 5) and Disordered eating pattern (NB-1 5)  Related to: Limited adherence to nutrition-related recommendations, Physical inactivity and Excessive energy intake  As Evidenced by: BMI >25, Expected anthropometric outcomes are not achieved, Excessive energy intake, Unintentional weight gain and Reports of disorded eating patterns Interventions and Teaching   Discussed pre-op and post-op nutrition guidelines  Patient educated and handouts provided  Capacity of post-surgery stomach  Adequate hydration  Sugar and fat restriction to decrease "dumping syndrome"  Expected weight loss  Weight loss plateaus/ possibility of weight regain  Exercise  Suggestions for pre-op diet  Nutrition considerations after surgery  Protein supplements  Meal planning and preparation  Appropriate carbohydrate, protein, and fat intake, and food/fluid choices to maximize safe weight loss, nutrient intake, and tolerance   Dietary and lifestyle changes  Possible problems with poor eating habits  Techniques for self monitoring and keeping daily food journal  Potential for food intolerance after surgery, and ways to deal with them including: lactose intolerance, nausea, reflux, vomiting, diarrhea, food intolerance, appetite changes, gas  Vitamin / Mineral supplementation of Multivitamin with minerals and Vitamin D  Pt is currently taking Vitamin D 50,000IU twice a week  Daily multivitamin    Patient is not currently pregnant and doesn't desire to become pregnant a minimum of one year post-op    Education provided to: patient  Barriers to learning: No barriers identified  Readiness to change: action  Prior research on procedure: discussed with provider and pre-op class  Comprehension: verbalizes and demonstrates understanding   Expected Compliance: good/excellent    Evaluation / Monitoring  Dietitian to Monitor: Eating pattern as discussed Tolerance of nutrition prescription Body weight Lab values Physical activity    Goals  Eliminate sugar sweetened beverages, Food journal, Exercise 30 minutes 5 times per week, Complete lession plans 1-6, Eat 3 meals per day and Eliminate mindless snacking    Workflow:  · Bloodwork:   · Pt had CBC done 10/23/21= good until 4/23/22   o Pt had Lipids, TSH, CMP done 11/5/21  CMP good until 5/5/22  Needs updated CBC+CMP    Ordered today    Weight Loss:   o 5% wt loss=22#=Goal of 418  4# to schedule surgery  o 10% wt loss=44#=Goal of 396 4# by day of surgery   EGD done 4/20/2022   Cardiac Risk Assessment: done 1/27/2022  Good until 7/27/22      Time Spent:   30 minutes

## 2022-04-27 ENCOUNTER — APPOINTMENT (OUTPATIENT)
Dept: LAB | Facility: MEDICAL CENTER | Age: 37
End: 2022-04-27
Attending: SURGERY
Payer: COMMERCIAL

## 2022-04-27 ENCOUNTER — OFFICE VISIT (OUTPATIENT)
Dept: BARIATRICS | Facility: CLINIC | Age: 37
End: 2022-04-27

## 2022-04-27 VITALS — BODY MASS INDEX: 66.63 KG/M2 | WEIGHT: 293 LBS

## 2022-04-27 DIAGNOSIS — E66.01 MORBID (SEVERE) OBESITY DUE TO EXCESS CALORIES (HCC): ICD-10-CM

## 2022-04-27 DIAGNOSIS — Z01.812 BLOOD TESTS PRIOR TO TREATMENT OR PROCEDURE: ICD-10-CM

## 2022-04-27 DIAGNOSIS — Z01.818 PREOPERATIVE CLEARANCE: ICD-10-CM

## 2022-04-27 DIAGNOSIS — E66.01 OBESITY, CLASS III, BMI 40-49.9 (MORBID OBESITY) (HCC): ICD-10-CM

## 2022-04-27 DIAGNOSIS — E66.01 OBESITY, CLASS III, BMI 40-49.9 (MORBID OBESITY) (HCC): Primary | ICD-10-CM

## 2022-04-27 LAB
ALBUMIN SERPL BCP-MCNC: 3.5 G/DL (ref 3.5–5)
ALP SERPL-CCNC: 54 U/L (ref 46–116)
ALT SERPL W P-5'-P-CCNC: 33 U/L (ref 12–78)
ANION GAP SERPL CALCULATED.3IONS-SCNC: 4 MMOL/L (ref 4–13)
AST SERPL W P-5'-P-CCNC: 15 U/L (ref 5–45)
BASOPHILS # BLD AUTO: 0.04 THOUSANDS/ΜL (ref 0–0.1)
BASOPHILS NFR BLD AUTO: 1 % (ref 0–1)
BILIRUB SERPL-MCNC: 0.68 MG/DL (ref 0.2–1)
BUN SERPL-MCNC: 9 MG/DL (ref 5–25)
CALCIUM SERPL-MCNC: 9 MG/DL (ref 8.3–10.1)
CHLORIDE SERPL-SCNC: 107 MMOL/L (ref 100–108)
CO2 SERPL-SCNC: 28 MMOL/L (ref 21–32)
CREAT SERPL-MCNC: 0.76 MG/DL (ref 0.6–1.3)
EOSINOPHIL # BLD AUTO: 0.29 THOUSAND/ΜL (ref 0–0.61)
EOSINOPHIL NFR BLD AUTO: 4 % (ref 0–6)
ERYTHROCYTE [DISTWIDTH] IN BLOOD BY AUTOMATED COUNT: 12.1 % (ref 11.6–15.1)
GFR SERPL CREATININE-BSD FRML MDRD: 101 ML/MIN/1.73SQ M
GLUCOSE P FAST SERPL-MCNC: 87 MG/DL (ref 65–99)
HCT VFR BLD AUTO: 41.7 % (ref 34.8–46.1)
HGB BLD-MCNC: 13.1 G/DL (ref 11.5–15.4)
IMM GRANULOCYTES # BLD AUTO: 0.01 THOUSAND/UL (ref 0–0.2)
IMM GRANULOCYTES NFR BLD AUTO: 0 % (ref 0–2)
LYMPHOCYTES # BLD AUTO: 2.79 THOUSANDS/ΜL (ref 0.6–4.47)
LYMPHOCYTES NFR BLD AUTO: 38 % (ref 14–44)
MCH RBC QN AUTO: 30.8 PG (ref 26.8–34.3)
MCHC RBC AUTO-ENTMCNC: 31.4 G/DL (ref 31.4–37.4)
MCV RBC AUTO: 98 FL (ref 82–98)
MONOCYTES # BLD AUTO: 0.54 THOUSAND/ΜL (ref 0.17–1.22)
MONOCYTES NFR BLD AUTO: 7 % (ref 4–12)
NEUTROPHILS # BLD AUTO: 3.65 THOUSANDS/ΜL (ref 1.85–7.62)
NEUTS SEG NFR BLD AUTO: 50 % (ref 43–75)
NRBC BLD AUTO-RTO: 0 /100 WBCS
PLATELET # BLD AUTO: 261 THOUSANDS/UL (ref 149–390)
PMV BLD AUTO: 11.2 FL (ref 8.9–12.7)
POTASSIUM SERPL-SCNC: 4.3 MMOL/L (ref 3.5–5.3)
PROT SERPL-MCNC: 7.9 G/DL (ref 6.4–8.2)
RBC # BLD AUTO: 4.25 MILLION/UL (ref 3.81–5.12)
SODIUM SERPL-SCNC: 139 MMOL/L (ref 136–145)
WBC # BLD AUTO: 7.32 THOUSAND/UL (ref 4.31–10.16)

## 2022-04-27 PROCEDURE — 36415 COLL VENOUS BLD VENIPUNCTURE: CPT

## 2022-04-27 PROCEDURE — 80053 COMPREHEN METABOLIC PANEL: CPT

## 2022-04-27 PROCEDURE — 85025 COMPLETE CBC W/AUTO DIFF WBC: CPT

## 2022-04-27 PROCEDURE — RECHECK: Performed by: DIETITIAN, REGISTERED

## 2022-05-02 DIAGNOSIS — A04.8 BACTERIAL INFECTION DUE TO H. PYLORI: Primary | ICD-10-CM

## 2022-05-02 RX ORDER — AMOXICILLIN 500 MG/1
1000 TABLET, FILM COATED ORAL 2 TIMES DAILY
Qty: 56 TABLET | Refills: 0 | Status: SHIPPED | OUTPATIENT
Start: 2022-05-02 | End: 2022-05-16

## 2022-05-02 RX ORDER — OMEPRAZOLE 20 MG/1
20 CAPSULE, DELAYED RELEASE ORAL 2 TIMES DAILY
Qty: 28 CAPSULE | Refills: 0 | Status: SHIPPED | OUTPATIENT
Start: 2022-05-02 | End: 2022-06-01

## 2022-05-02 RX ORDER — CLARITHROMYCIN 500 MG/1
500 TABLET, COATED ORAL EVERY 12 HOURS SCHEDULED
Qty: 28 TABLET | Refills: 0 | Status: SHIPPED | OUTPATIENT
Start: 2022-05-02 | End: 2022-05-16

## 2022-05-02 NOTE — TELEPHONE ENCOUNTER
Called patient and made her aware that she is positive for H-pylori and Dr Prabhu Pearson sent 3 medications to her preferred pharmacy for her to take for 14 days  Patient understood and will contact the office with any questions or concerns

## 2022-05-03 ENCOUNTER — HOSPITAL ENCOUNTER (EMERGENCY)
Facility: HOSPITAL | Age: 37
Discharge: HOME | End: 2022-05-03
Attending: EMERGENCY MEDICINE | Admitting: EMERGENCY MEDICINE
Payer: COMMERCIAL

## 2022-05-03 VITALS
OXYGEN SATURATION: 95 % | RESPIRATION RATE: 20 BRPM | TEMPERATURE: 98.3 F | HEART RATE: 89 BPM | SYSTOLIC BLOOD PRESSURE: 136 MMHG | DIASTOLIC BLOOD PRESSURE: 65 MMHG

## 2022-05-03 DIAGNOSIS — L02.91 ABSCESS: Primary | ICD-10-CM

## 2022-05-03 PROCEDURE — 99282 EMERGENCY DEPT VISIT SF MDM: CPT

## 2022-05-03 PROCEDURE — 10060 I&D ABSCESS SIMPLE/SINGLE: CPT | Performed by: EMERGENCY MEDICINE

## 2022-05-03 PROCEDURE — 99284 EMERGENCY DEPT VISIT MOD MDM: CPT | Performed by: EMERGENCY MEDICINE

## 2022-05-03 RX ORDER — DOXYCYCLINE HYCLATE 100 MG/1
100 CAPSULE ORAL EVERY 12 HOURS SCHEDULED
Qty: 20 CAPSULE | Refills: 0 | Status: SHIPPED | OUTPATIENT
Start: 2022-05-03 | End: 2022-05-13

## 2022-05-03 NOTE — ED PROVIDER NOTES
History  Chief Complaint   Patient presents with    Abscess     Abscess to graoin  Started feeling it on Saturday, today the pain is too great  Pt reports she believes it is going to have to be cut and drained  Hx of same  40-year-old female presented emergency room with left abdominal wall abscess  Patient notes that the this in the of fold of her abdomen  Has been painful for 2 days  Has multiple episodes of groin abscesses in the past   Denies fevers chills cough congestion rhinorrhea  Currently on clarithromycin and amoxicillin for H pylori treatment  History provided by:  Patient  Abscess  Location:  Torso  Torso abscess location:  Abd LLQ  Abscess quality: painful    Red streaking: no    Duration:  2 days  Progression:  Worsening  Pain details:     Quality:  Sharp    Severity:  Moderate    Duration:  2 days  Chronicity:  Recurrent  Relieved by:  Nothing  Associated symptoms: no fever and no vomiting        Prior to Admission Medications   Prescriptions Last Dose Informant Patient Reported? Taking?    Etonogestrel (NEXPLANON) 76 MG IMPL  Self Yes No   Sig: Inject under the skin    Multiple Vitamin (multivitamin) capsule  Self Yes No   Sig: Take 1 capsule by mouth daily   albuterol (2 5 mg/3 mL) 0 083 % nebulizer solution  Self Yes No   Sig: INHALE 1 VIAL VIA NEBULIZER EVERY 4 HOURS AS NEEDED   amoxicillin (AMOXIL) 500 MG tablet   No No   Sig: Take 2 tablets (1,000 mg total) by mouth 2 (two) times a day for 14 days   clarithromycin (BIAXIN) 500 mg tablet   No No   Sig: Take 1 tablet (500 mg total) by mouth every 12 (twelve) hours for 14 days   ergocalciferol (VITAMIN D2) 50,000 units  Self No No   Sig: Take 1 capsule (50,000 Units total) by mouth 2 (two) times a week with meals   fexofenadine-pseudoephedrine (ALLEGRA-D 24) 180-240 MG per 24 hr tablet   No No   Sig: Take 1 tablet by mouth daily   fluticasone-vilanterol (Breo Ellipta) 200-25 MCG/INH inhaler   No No   Sig: Inhale 1 puff daily Rinse mouth after use  fluticasone-vilanterol (Breo Ellipta) 200-25 MCG/INH inhaler   No No   Sig: Inhale 1 puff daily Rinse mouth after use  ipratropium (ATROVENT) 0 02 % nebulizer solution   No No   Sig: Take 2 5 mL (0 5 mg total) by nebulization 3 (three) times a day   Patient not taking: Reported on 11/9/2021    montelukast (SINGULAIR) 10 mg tablet  Self No No   Sig: Take 1 tablet (10 mg total) by mouth daily at bedtime   nystatin (MYCOSTATIN) powder  Self No No   Sig: Apply topically 2 (two) times a day   Patient not taking: Reported on 1/27/2022    omeprazole (PriLOSEC) 20 mg delayed release capsule   No No   Sig: Take 1 capsule (20 mg total) by mouth 2 (two) times a day for 14 days      Facility-Administered Medications: None       Past Medical History:   Diagnosis Date    Allergic rhinitis     Asthma     Chronic pain     COVID-19 3/26/2021    Cutaneous abscess of back excluding buttocks 4/6/2021    Depression     Insomnia     Major depressive disorder, recurrent episode, severe ([address] )     Seasonal allergies        Past Surgical History:   Procedure Laterality Date    ABCESS DRAINAGE      Incision and dranage of skin abscess    CHOLECYSTECTOMY      UPPER GASTROINTESTINAL ENDOSCOPY         Family History   Problem Relation Age of Onset    Other Father         Cardiac disorder    Hypertension Father     Diabetes Maternal Grandmother     Diabetes Mother     Hypertension Mother     Cervical cancer Mother      I have reviewed and agree with the history as documented      E-Cigarette/Vaping    E-Cigarette Use Never User      E-Cigarette/Vaping Substances    Nicotine No     THC No     CBD No     Flavoring No     Other No     Unknown No      Social History     Tobacco Use    Smoking status: Former Smoker    Smokeless tobacco: Never Used   Vaping Use    Vaping Use: Never used   Substance Use Topics    Alcohol use: No    Drug use: No       Review of Systems   Constitutional: Negative for chills and fever  HENT: Negative for ear pain and sore throat  Eyes: Negative for pain and visual disturbance  Respiratory: Negative for cough and shortness of breath  Cardiovascular: Negative for chest pain and palpitations  Gastrointestinal: Negative for abdominal pain and vomiting  Genitourinary: Negative for dysuria and hematuria  Musculoskeletal: Negative for arthralgias and back pain  Skin: Negative for color change and rash  Neurological: Negative for seizures and syncope  All other systems reviewed and are negative  Physical Exam  Physical Exam  Vitals and nursing note reviewed  Constitutional:       General: She is not in acute distress  Appearance: She is well-developed  HENT:      Head: Normocephalic and atraumatic  Nose: Nose normal       Mouth/Throat:      Mouth: Mucous membranes are moist    Eyes:      Conjunctiva/sclera: Conjunctivae normal    Cardiovascular:      Rate and Rhythm: Normal rate and regular rhythm  Heart sounds: No murmur heard  Pulmonary:      Effort: Pulmonary effort is normal  No respiratory distress  Breath sounds: Normal breath sounds  Abdominal:      Palpations: Abdomen is soft  Tenderness: There is no abdominal tenderness  Comments: Left lower abdominal wall measuring 3 cm x 2 cm area of erythema and fluctuance with tenderness  Musculoskeletal:      Cervical back: Neck supple  Skin:     General: Skin is warm and dry  Capillary Refill: Capillary refill takes less than 2 seconds  Neurological:      Mental Status: She is alert and oriented to person, place, and time           Vital Signs  ED Triage Vitals [05/03/22 1830]   Temperature Pulse Respirations Blood Pressure SpO2   98 3 °F (36 8 °C) 89 20 136/65 95 %      Temp Source Heart Rate Source Patient Position - Orthostatic VS BP Location FiO2 (%)   Oral Monitor -- -- --      Pain Score       10 - Worst Possible Pain           Vitals:    05/03/22 1830   BP: 136/65   Pulse: 89         Visual Acuity      ED Medications  Medications - No data to display    Diagnostic Studies  Results Reviewed     None                 No orders to display              Procedures  Incision and drain    Date/Time: 5/3/2022 7:20 PM  Performed by: Sam Black MD  Authorized by: Sam Black MD   Universal Protocol:  Consent: Verbal consent obtained  Risks and benefits: risks, benefits and alternatives were discussed  Consent given by: patient  Patient understanding: patient states understanding of the procedure being performed  Patient consent: the patient's understanding of the procedure matches consent given  Patient identity confirmed: verbally with patient      Patient location:  ED  Location:     Type:  Abscess    Size:  3cm    Location:  Trunk    Trunk location:  Abdomen  Pre-procedure details:     Skin preparation:  Betadine  Anesthesia (see MAR for exact dosages): Anesthesia method:  Local infiltration    Local anesthetic:  Lidocaine 1% w/o epi (2mL)  Procedure details:     Complexity:  Simple    Needle aspiration: yes      Needle size:  18 G    Approach:  Puncture    Incision depth:  Subcutaneous    Drainage:  Purulent    Drainage amount:  Copious    Packing materials:  None  Post-procedure details:     Patient tolerance of procedure: Tolerated well, no immediate complications             ED Course                                             MDM  Number of Diagnoses or Management Options  Abscess  Diagnosis management comments: 10 cc of pus drained from abscess  Not cultured has had multiple cultures in the past   Will treat with doxycycline        Disposition  Final diagnoses:   Abscess     Time reflects when diagnosis was documented in both MDM as applicable and the Disposition within this note     Time User Action Codes Description Comment    5/3/2022  7:10 PM Lisbeth Christianson [L02 [id number]] Abscess       ED Disposition     ED Disposition Condition Date/Time Comment    Discharge Stable Tue May 3, 2022  7:10 PM Satsuma Rasta discharge to home/self care              Follow-up Information     Follow up With Specialties Details Why [id number] [address] Internal Medicine   [address]  [address] Floor, [facility],[address]  [phone]            Discharge Medication List as of 5/3/2022  7:12 PM      START taking these medications    Details   doxycycline hyclate (VIBRAMYCIN) 100 mg capsule Take 1 capsule (100 mg total) by mouth every 12 (twelve) hours for 10 days, Starting Tue 5/3/2022, Until Fri 5/13/2022, Normal         CONTINUE these medications which have NOT CHANGED    Details   albuterol (2 5 mg/3 mL) 0 083 % nebulizer solution INHALE 1 VIAL VIA NEBULIZER EVERY 4 HOURS AS NEEDED, Historical Med      amoxicillin (AMOXIL) 500 MG tablet Take 2 tablets (1,000 mg total) by mouth 2 (two) times a day for 14 days, Starting Mon 5/2/2022, Until Mon 5/16/2022, Normal      clarithromycin (BIAXIN) 500 mg tablet Take 1 tablet (500 mg total) by mouth every 12 (twelve) hours for 14 days, Starting Mon 5/2/2022, Until Mon 5/16/2022, Normal      ergocalciferol (VITAMIN D2) 50,000 units Take 1 capsule (50,000 Units total) by mouth 2 (two) times a week with meals, Starting Thu 11/11/2021, Until Thu 1/27/2022, Normal      Etonogestrel (NEXPLANON) 68 MG IMPL Inject under the skin , Historical Med      fexofenadine-pseudoephedrine (ALLEGRA-D 24) 180-240 MG per 24 hr tablet Take 1 tablet by mouth daily, Starting Wed 3/16/2022, Until Tue 6/14/2022, Normal      !! fluticasone-vilanterol (Breo Ellipta) 200-25 MCG/INH inhaler Inhale 1 puff daily Rinse mouth after use , Starting Wed 3/16/2022, Until Tue 6/14/2022, Normal      !! fluticasone-vilanterol (Breo Ellipta) 200-25 MCG/INH inhaler Inhale 1 puff daily Rinse mouth after use , Starting Wed 3/16/2022, Until u 3/16/2023, Normal      ipratropium (ATROVENT) 0 02 % nebulizer solution Take 2 5 mL (0 5 mg total) by nebulization 3 (three) times a day, Starting Sun 10/24/2021, Until Tue 11/23/2021, Normal      montelukast (SINGULAIR) 10 mg tablet Take 1 tablet (10 mg total) by mouth daily at bedtime, Starting Tue 12/21/2021, Until Sun 6/19/2022, Normal      Multiple Vitamin (multivitamin) capsule Take 1 capsule by mouth daily, Historical Med      nystatin (MYCOSTATIN) powder Apply topically 2 (two) times a day, Starting Tue 4/6/2021, Normal      omeprazole (PriLOSEC) 20 mg delayed release capsule Take 1 capsule (20 mg total) by mouth 2 (two) times a day for 14 days, Starting Mon 5/2/2022, Until Mon 5/16/2022, Normal       !! - Potential duplicate medications found  Please discuss with provider  No discharge procedures on file      PDMP Review     None          ED Provider  Electronically Signed by           Gordy Merlin, MD  05/03/22 4410

## 2022-05-05 ENCOUNTER — TELEPHONE (OUTPATIENT)
Dept: INTERNAL MEDICINE CLINIC | Facility: CLINIC | Age: 37
End: 2022-05-05

## 2022-05-05 NOTE — TELEPHONE ENCOUNTER
Patient called and is having weight loss surgery in June and they are asking for a clearance letter since she had covid in December to say she can have surgery or do you need to see her in the office for an appointment  She asked if it can please be faxed to [phone]  She can be reached at [phone]    Thank you

## 2022-05-25 ENCOUNTER — OFFICE VISIT (OUTPATIENT)
Dept: INTERNAL MEDICINE CLINIC | Facility: CLINIC | Age: 37
End: 2022-05-25
Payer: COMMERCIAL

## 2022-05-25 VITALS
RESPIRATION RATE: 16 BRPM | BODY MASS INDEX: 50.02 KG/M2 | TEMPERATURE: 97.9 F | WEIGHT: 293 LBS | HEIGHT: 64 IN | DIASTOLIC BLOOD PRESSURE: 84 MMHG | HEART RATE: 70 BPM | SYSTOLIC BLOOD PRESSURE: 124 MMHG

## 2022-05-25 DIAGNOSIS — Z01.818 PREOPERATIVE CLEARANCE: Primary | ICD-10-CM

## 2022-05-25 DIAGNOSIS — J30.9 ALLERGIC RHINITIS, UNSPECIFIED SEASONALITY, UNSPECIFIED TRIGGER: ICD-10-CM

## 2022-05-25 DIAGNOSIS — E66.01 MORBID (SEVERE) OBESITY DUE TO EXCESS CALORIES (HCC): ICD-10-CM

## 2022-05-25 DIAGNOSIS — Z86.16 PERSONAL HISTORY OF COVID-19: ICD-10-CM

## 2022-05-25 DIAGNOSIS — J45.50 SEVERE PERSISTENT ASTHMA WITHOUT COMPLICATION: ICD-10-CM

## 2022-05-25 PROBLEM — J45.21 MILD INTERMITTENT ASTHMA WITH EXACERBATION: Status: RESOLVED | Noted: 2021-03-23 | Resolved: 2022-05-25

## 2022-05-25 PROCEDURE — 99214 OFFICE O/P EST MOD 30 MIN: CPT | Performed by: INTERNAL MEDICINE

## 2022-05-25 NOTE — PROGRESS NOTES
Assessment/Plan:    Problem List Items Addressed This Visit        Respiratory    Severe persistent asthma without complication     -currently not flared  -associated with allergic rhinitis  -on BREO, nebs, singulair  -follows with Pulm           Allergic rhinitis     -multiple aeroallergens, animal dander  -may benefit from immunotherapy, consider allergy referral in the future              Other    Personal history of COVID-19     -infections occurred 3/2021 and 12/2021  -she is vaccinated and boosted x1  -preop cardiac clearance obtained 1/27/2022 with EKG  -no residual symptoms           Preoperative clearance - Primary     -pt low risk of developing a major adverse clinical event for moderate risk procedure  She may proceed with planned surgery           Morbid (severe) obesity due to excess calories ([address] )     -will be undergoing lap sleeve gastrectomy 6/14 by Dr Daniel Celeste                 Subjective:      Patient ID: Favio Field is a 40 y o  female  HPI  44yo female with asthma, vitamin D def, MDD here for follow up care  She has lost 60 pounds with meal plans and has gone for walks 6-7 days per week  Also does intermittent fasting, 16hrs  Starting Monday she is starting a liquid diet  She had two episodes of COVID 3/2021 and 12/2021  Had cardiac eval 1/27/22 in preparation for bariatric surgery  She is vaccinated and boosted  In the interim, had another episode of abscess, most recently in L groin on 5/3 that required I&D and placed on antibiotics  Other places have been in axilla and her back  Favio Field is a 40 y o  female who presents to the office today for a preoperative consultation at the request of surgeon Dr Daniel Celeste who plans on performing lap sleeve gastrectomy with robotics on June 14  Type of anesthesia to be used general   Prior anesthesia adverse reactions no      Exercise capacity - Able to walk 4 blocks or climb 2 flights of stairs without symptoms - no    Easy bleeding/bruising - no    Chest pain - no    Dyspnea, wheezing, cough - no    Sleep apnea - no    Lifestyle:   reports no history of alcohol use  reports that she has quit smoking  She has never used smokeless tobacco    reports no history of drug use      The following portions of the patient's history were reviewed and updated as appropriate: allergies, current medications, past family history, past medical history, past social history, past surgical history and problem list       Current Outpatient Medications:     albuterol (2 5 mg/3 mL) 0 083 % nebulizer solution, INHALE 1 VIAL VIA NEBULIZER EVERY 4 HOURS AS NEEDED, Disp: , Rfl: 1    fexofenadine-pseudoephedrine (ALLEGRA-D 24) 180-240 MG per 24 hr tablet, Take 1 tablet by mouth daily, Disp: 30 tablet, Rfl: 2    fluticasone-vilanterol (Breo Ellipta) 200-25 MCG/INH inhaler, Inhale 1 puff daily Rinse mouth after use , Disp: 180 blister, Rfl: 0    montelukast (SINGULAIR) 10 mg tablet, Take 1 tablet (10 mg total) by mouth daily at bedtime, Disp: 90 tablet, Rfl: 1    Multiple Vitamin (multivitamin) capsule, Take 1 capsule by mouth daily, Disp: , Rfl:     ergocalciferol (VITAMIN D2) 50,000 units, Take 1 capsule (50,000 Units total) by mouth 2 (two) times a week with meals, Disp: 8 capsule, Rfl: 1    etonogestrel (NEXPLANON) subdermal implant, Inject under the skin  (Patient not taking: Reported on 5/25/2022), Disp: , Rfl:     ipratropium (ATROVENT) 0 02 % nebulizer solution, Take 2 5 mL (0 5 mg total) by nebulization 3 (three) times a day (Patient not taking: Reported on 11/9/2021 ), Disp: 225 mL, Rfl: 0    nystatin (MYCOSTATIN) powder, Apply topically 2 (two) times a day (Patient not taking: No sig reported), Disp: 60 g, Rfl: 0    omeprazole (PriLOSEC) 20 mg delayed release capsule, Take 1 capsule (20 mg total) by mouth 2 (two) times a day for 14 days, Disp: 28 capsule, Rfl: 0    Review of Systems   Constitutional: Positive for activity change, appetite change and unexpected weight change (weight loss)  HENT: Positive for postnasal drip, rhinorrhea and sneezing  Respiratory: Negative for cough, chest tightness, shortness of breath and wheezing  Cardiovascular: Negative for chest pain, palpitations and leg swelling  Gastrointestinal: Negative for abdominal pain, constipation, diarrhea and nausea  Neurological: Negative for dizziness and headaches  Hematological: Does not bruise/bleed easily  Psychiatric/Behavioral: Negative for dysphoric mood  The patient is not nervous/anxious  Objective:    /84 (BP Location: Left arm, Patient Position: Sitting) Comment (BP Location): forearm  Pulse 70   Temp 97 9 °F (36 6 °C) (Tympanic)   Resp 16   Ht 5' 4" (1 626 m)   Wt (!) 173 kg (380 lb 12 8 oz)   BMI 65 36 kg/m²      Physical Exam  Vitals reviewed  Constitutional:       General: She is not in acute distress  Appearance: She is obese  She is not diaphoretic  HENT:      Head: Normocephalic  Right Ear: There is no impacted cerumen  Left Ear: There is no impacted cerumen  Nose: Nose normal  No congestion or rhinorrhea  Mouth/Throat:      Mouth: Mucous membranes are moist       Pharynx: Oropharynx is clear  No oropharyngeal exudate or posterior oropharyngeal erythema  Eyes:      Extraocular Movements: Extraocular movements intact  Conjunctiva/sclera: Conjunctivae normal    Cardiovascular:      Rate and Rhythm: Normal rate and regular rhythm  Pulses: Normal pulses  Heart sounds: Normal heart sounds  Pulmonary:      Effort: Pulmonary effort is normal  No respiratory distress  Breath sounds: Normal breath sounds  No wheezing  Abdominal:      General: Bowel sounds are normal  There is no distension  Palpations: Abdomen is soft  Tenderness: There is no abdominal tenderness  Musculoskeletal:      Cervical back: Neck supple  Right lower leg: No edema  Left lower leg: No edema  Lymphadenopathy:      Cervical: No cervical adenopathy  Skin:     Coloration: Skin is not pale  Comments: Nasal piercing, multiple tattoos   Neurological:      General: No focal deficit present  Mental Status: She is alert and oriented to person, place, and time  Psychiatric:         Attention and Perception: Attention normal          Mood and Affect: Mood normal          Speech: Speech normal          Behavior: Behavior is cooperative             Recent Results (from the past 672 hour(s))   CBC and differential    Collection Time: 04/27/22 10:18 AM   Result Value Ref Range    WBC 7 32 4 31 - 10 16 Thousand/uL    RBC 4 25 3 81 - 5 12 Million/uL    Hemoglobin 13 1 11 5 - 15 4 g/dL    Hematocrit 41 7 34 8 - 46 1 %    MCV 98 82 - 98 fL    MCH 30 8 26 8 - 34 3 pg    MCHC 31 4 31 4 - 37 4 g/dL    RDW 12 1 11 6 - 15 1 %    MPV 11 2 8 9 - 12 7 fL    Platelets 869 339 - 620 Thousands/uL    nRBC 0 /100 WBCs    Neutrophils Relative 50 43 - 75 %    Immat GRANS % 0 0 - 2 %    Lymphocytes Relative 38 14 - 44 %    Monocytes Relative 7 4 - 12 %    Eosinophils Relative 4 0 - 6 %    Basophils Relative 1 0 - 1 %    Neutrophils Absolute 3 65 1 85 - 7 62 Thousands/µL    Immature Grans Absolute 0 01 0 00 - 0 20 Thousand/uL    Lymphocytes Absolute 2 79 0 60 - 4 47 Thousands/µL    Monocytes Absolute 0 54 0 17 - 1 22 Thousand/µL    Eosinophils Absolute 0 29 0 00 - 0 61 Thousand/µL    Basophils Absolute 0 04 0 00 - 0 10 Thousands/µL   Comprehensive metabolic panel    Collection Time: 04/27/22 10:18 AM   Result Value Ref Range    Sodium 139 136 - 145 mmol/L    Potassium 4 3 3 5 - 5 3 mmol/L    Chloride 107 100 - 108 mmol/L    CO2 28 21 - 32 mmol/L    ANION GAP 4 4 - 13 mmol/L    BUN 9 5 - 25 mg/dL    Creatinine 0 76 0 60 - 1 30 mg/dL    Glucose, Fasting 87 65 - 99 mg/dL    Calcium 9 0 8 3 - 10 1 mg/dL    AST 15 5 - 45 U/L    ALT 33 12 - 78 U/L    Alkaline Phosphatase 54 46 - 116 U/L    Total Protein 7 9 6 4 - 8 2 g/dL Albumin 3 5 3 5 - 5 0 g/dL    Total Bilirubin 0 68 0 20 - 1 00 mg/dL    eGFR 101 ml/min/1 73sq m

## 2022-05-25 NOTE — ASSESSMENT & PLAN NOTE
-pt low risk of developing a major adverse clinical event for moderate risk procedure    She may proceed with planned surgery

## 2022-05-25 NOTE — ASSESSMENT & PLAN NOTE
-currently not flared  -associated with allergic rhinitis  -on BREO, nebs, singulair  -follows with Pulm

## 2022-05-25 NOTE — ASSESSMENT & PLAN NOTE
-infections occurred 3/2021 and 12/2021  -she is vaccinated and boosted x1  -preop cardiac clearance obtained 1/27/2022 with EKG  -no residual symptoms

## 2022-05-25 NOTE — ASSESSMENT & PLAN NOTE
-multiple aeroallergens, animal dander  -may benefit from immunotherapy, consider allergy referral in the future

## 2022-05-25 NOTE — LETTER
May 25, 2022     [place] MD Samir  [address] [facility] [address] [facility]    Patient: Torito Shanks   YOB: 1985   Date of Visit: 5/25/2022       Dear Dr Su Villagran Recipients: Thank you for referring Alyx Guevara to me for evaluation  Below are my notes for this consultation  If you have questions, please do not hesitate to call me  I look forward to following your patient along with you  Sincerely,        Manolo Hull,         CC: No Recipients  Priscilla Noguera  5/25/2022  9:30 AM  Incomplete  Assessment/Plan:    Problem List Items Addressed This Visit        Respiratory    Severe persistent asthma without complication     -currently not flared  -associated with allergic rhinitis  -on BREO, nebs, singulair  -follows with Pulm           Allergic rhinitis     -multiple aeroallergens, animal dander  -may benefit from immunotherapy, consider allergy referral in the future              Other    Personal history of COVID-19 - Primary     -infections occurred 3/2021 and 12/2021  -she is vaccinated and boosted x1  -preop cardiac clearance obtained 1/27/2022 with EKG  -no residual symptoms           Preoperative clearance     -pt low risk of developing a major adverse clinical event for moderate risk procedure  She may proceed with planned surgery           Morbid (severe) obesity due to excess calories ([address] )     -will be undergoing lap sleeve gastrectomy 6/14 by Dr Coy Level                 Subjective:      Patient ID: Torito Shanks is a 40 y o  female  HPI  44yo female with asthma, vitamin D def, MDD here for follow up care  She has lost 60 pounds with meal plans and has gone for walks 6-7 days per week  Also does intermittent fasting, 16hrs  Starting Monday she is starting a liquid diet  She had two episodes of COVID 3/2021 and 12/2021  Had cardiac eval 1/27/22 in preparation for bariatric surgery  She is vaccinated and boosted      In the interim, had another episode of abscess, most recently in L groin on 5/3 that required I&D and placed on antibiotics  Other places have been in axilla and her back  Vivek Roy is a 40 y o  female who presents to the office today for a preoperative consultation at the request of surgeon Dr Anne Ha who plans on performing lap sleeve gastrectomy with robotics on June 14  Type of anesthesia to be used general   Prior anesthesia adverse reactions no  Exercise capacity - Able to walk 4 blocks or climb 2 flights of stairs without symptoms - no    Easy bleeding/bruising - no    Chest pain - no    Dyspnea, wheezing, cough - no    Sleep apnea - no    Lifestyle:   reports no history of alcohol use  reports that she has quit smoking  She has never used smokeless tobacco    reports no history of drug use      The following portions of the patient's history were reviewed and updated as appropriate: allergies, current medications, past family history, past medical history, past social history, past surgical history and problem list       Current Outpatient Medications:     albuterol (2 5 mg/3 mL) 0 083 % nebulizer solution, INHALE 1 VIAL VIA NEBULIZER EVERY 4 HOURS AS NEEDED, Disp: , Rfl: 1    fexofenadine-pseudoephedrine (ALLEGRA-D 24) 180-240 MG per 24 hr tablet, Take 1 tablet by mouth daily, Disp: 30 tablet, Rfl: 2    fluticasone-vilanterol (Breo Ellipta) 200-25 MCG/INH inhaler, Inhale 1 puff daily Rinse mouth after use , Disp: 180 blister, Rfl: 0    montelukast (SINGULAIR) 10 mg tablet, Take 1 tablet (10 mg total) by mouth daily at bedtime, Disp: 90 tablet, Rfl: 1    Multiple Vitamin (multivitamin) capsule, Take 1 capsule by mouth daily, Disp: , Rfl:     ergocalciferol (VITAMIN D2) 50,000 units, Take 1 capsule (50,000 Units total) by mouth 2 (two) times a week with meals, Disp: 8 capsule, Rfl: 1    etonogestrel (NEXPLANON) subdermal implant, Inject under the skin  (Patient not taking: Reported on 5/25/2022), Disp: , Rfl:     ipratropium (ATROVENT) 0 02 % nebulizer solution, Take 2 5 mL (0 5 mg total) by nebulization 3 (three) times a day (Patient not taking: Reported on 11/9/2021 ), Disp: 225 mL, Rfl: 0    nystatin (MYCOSTATIN) powder, Apply topically 2 (two) times a day (Patient not taking: No sig reported), Disp: 60 g, Rfl: 0    omeprazole (PriLOSEC) 20 mg delayed release capsule, Take 1 capsule (20 mg total) by mouth 2 (two) times a day for 14 days, Disp: 28 capsule, Rfl: 0    Review of Systems   Constitutional: Positive for activity change, appetite change and unexpected weight change (weight loss)  HENT: Positive for postnasal drip, rhinorrhea and sneezing  Respiratory: Negative for cough, chest tightness, shortness of breath and wheezing  Cardiovascular: Negative for chest pain, palpitations and leg swelling  Gastrointestinal: Negative for abdominal pain, constipation, diarrhea and nausea  Neurological: Negative for dizziness and headaches  Hematological: Does not bruise/bleed easily  Psychiatric/Behavioral: Negative for dysphoric mood  The patient is not nervous/anxious  Objective:    /84 (BP Location: Left arm, Patient Position: Sitting) Comment (BP Location): forearm  Pulse 70   Temp 97 9 °F (36 6 °C) (Tympanic)   Resp 16   Ht 5' 4" (1 626 m)   Wt (!) 173 kg (380 lb 12 8 oz)   BMI 65 36 kg/m²      Physical Exam  Vitals reviewed  Constitutional:       General: She is not in acute distress  Appearance: She is obese  She is not diaphoretic  HENT:      Head: Normocephalic  Right Ear: There is no impacted cerumen  Left Ear: There is no impacted cerumen  Nose: Nose normal  No congestion or rhinorrhea  Mouth/Throat:      Mouth: Mucous membranes are moist       Pharynx: Oropharynx is clear  No oropharyngeal exudate or posterior oropharyngeal erythema  Eyes:      Extraocular Movements: Extraocular movements intact        Conjunctiva/sclera: Conjunctivae normal    Cardiovascular:      Rate and Rhythm: Normal rate and regular rhythm  Pulses: Normal pulses  Heart sounds: Normal heart sounds  Pulmonary:      Effort: Pulmonary effort is normal  No respiratory distress  Breath sounds: Normal breath sounds  No wheezing  Abdominal:      General: Bowel sounds are normal  There is no distension  Palpations: Abdomen is soft  Tenderness: There is no abdominal tenderness  Musculoskeletal:      Cervical back: Neck supple  Right lower leg: No edema  Left lower leg: No edema  Lymphadenopathy:      Cervical: No cervical adenopathy  Skin:     Coloration: Skin is not pale  Comments: Nasal piercing, multiple tattoos   Neurological:      General: No focal deficit present  Mental Status: She is alert and oriented to person, place, and time  Psychiatric:         Attention and Perception: Attention normal          Mood and Affect: Mood normal          Speech: Speech normal          Behavior: Behavior is cooperative             Recent Results (from the past 672 hour(s))   CBC and differential    Collection Time: 04/27/22 10:18 AM   Result Value Ref Range    WBC 7 32 4 31 - 10 16 Thousand/uL    RBC 4 25 3 81 - 5 12 Million/uL    Hemoglobin 13 1 11 5 - 15 4 g/dL    Hematocrit 41 7 34 8 - 46 1 %    MCV 98 82 - 98 fL    MCH 30 8 26 8 - 34 3 pg    MCHC 31 4 31 4 - 37 4 g/dL    RDW 12 1 11 6 - 15 1 %    MPV 11 2 8 9 - 12 7 fL    Platelets 551 412 - 067 Thousands/uL    nRBC 0 /100 WBCs    Neutrophils Relative 50 43 - 75 %    Immat GRANS % 0 0 - 2 %    Lymphocytes Relative 38 14 - 44 %    Monocytes Relative 7 4 - 12 %    Eosinophils Relative 4 0 - 6 %    Basophils Relative 1 0 - 1 %    Neutrophils Absolute 3 65 1 85 - 7 62 Thousands/µL    Immature Grans Absolute 0 01 0 00 - 0 20 Thousand/uL    Lymphocytes Absolute 2 79 0 60 - 4 47 Thousands/µL    Monocytes Absolute 0 54 0 17 - 1 22 Thousand/µL    Eosinophils Absolute 0 29 0 00 - 0 61 Thousand/µL    Basophils Absolute 0 04 0 00 - 0 10 Thousands/µL   Comprehensive metabolic panel    Collection Time: 04/27/22 10:18 AM   Result Value Ref Range    Sodium 139 136 - 145 mmol/L    Potassium 4 3 3 5 - 5 3 mmol/L    Chloride 107 100 - 108 mmol/L    CO2 28 21 - 32 mmol/L    ANION GAP 4 4 - 13 mmol/L    BUN 9 5 - 25 mg/dL    Creatinine 0 76 0 60 - 1 30 mg/dL    Glucose, Fasting 87 65 - 99 mg/dL    Calcium 9 0 8 3 - 10 1 mg/dL    AST 15 5 - 45 U/L    ALT 33 12 - 78 U/L    Alkaline Phosphatase 54 46 - 116 U/L    Total Protein 7 9 6 4 - 8 2 g/dL    Albumin 3 5 3 5 - 5 0 g/dL    Total Bilirubin 0 68 0 20 - 1 00 mg/dL    eGFR 101 ml/min/1 73sq m         [address] [facility],   5/25/2022  9:20 AM  Sign when Signing Visit  Assessment/Plan:    Problem List Items Addressed This Visit        Respiratory    Allergic rhinitis     -multiple aeroallergens, animal dander  -may benefit from immunotherapy, consider allergy referral in the future              Other    Personal history of COVID-19 - Primary    Preoperative clearance     -pt low risk of developing a major adverse clinical event for moderate risk procedure           Morbid (severe) obesity due to excess calories (HCC)     -will be undergoing lap sleeve gastrectomy 6/14 by Dr Barry Krishnamurthy                 Subjective:      Patient ID: Lizbeth Canela is a 40 y o  female  HPI  46yo female with asthma, vitamin D def, MDD here for follow up care  She has lost 60 pounds with meal plans and has gone for walks 6-7 days per week  Also does intermittent fasting, 16hrs  Starting Monday she is starting a liquid diet  She had two episodes of COVID 3/2021 and 12/2021  Had cardiac eval 1/27/22 in preparation for bariatric surgery  She is vaccinated and boosted  In the interim, had another episode of abscess, most recently in L groin on 5/3 that required I&D and placed on antibiotics  Other places have been in axilla and her back      Lizbeth Canela is a 40 y o  female who presents to the office today for a preoperative consultation at the request of surgeon Dr Luisa Brunner who plans on performing lap sleeve gastrectomy with robotics on June 14  Type of anesthesia to be used general   Prior anesthesia adverse reactions no  Exercise capacity - Able to walk 4 blocks or climb 2 flights of stairs without symptoms - no    Easy bleeding/bruising - no    Chest pain - no    Dyspnea, wheezing, cough - no    Sleep apnea - no    Lifestyle:   reports no history of alcohol use  reports that she has quit smoking  She has never used smokeless tobacco    reports no history of drug use      The following portions of the patient's history were reviewed and updated as appropriate: allergies, current medications, past family history, past medical history, past social history, past surgical history and problem list       Current Outpatient Medications:     albuterol (2 5 mg/3 mL) 0 083 % nebulizer solution, INHALE 1 VIAL VIA NEBULIZER EVERY 4 HOURS AS NEEDED, Disp: , Rfl: 1    fexofenadine-pseudoephedrine (ALLEGRA-D 24) 180-240 MG per 24 hr tablet, Take 1 tablet by mouth daily, Disp: 30 tablet, Rfl: 2    fluticasone-vilanterol (Breo Ellipta) 200-25 MCG/INH inhaler, Inhale 1 puff daily Rinse mouth after use , Disp: 180 blister, Rfl: 0    montelukast (SINGULAIR) 10 mg tablet, Take 1 tablet (10 mg total) by mouth daily at bedtime, Disp: 90 tablet, Rfl: 1    Multiple Vitamin (multivitamin) capsule, Take 1 capsule by mouth daily, Disp: , Rfl:     ergocalciferol (VITAMIN D2) 50,000 units, Take 1 capsule (50,000 Units total) by mouth 2 (two) times a week with meals, Disp: 8 capsule, Rfl: 1    etonogestrel (NEXPLANON) subdermal implant, Inject under the skin  (Patient not taking: Reported on 5/25/2022), Disp: , Rfl:     ipratropium (ATROVENT) 0 02 % nebulizer solution, Take 2 5 mL (0 5 mg total) by nebulization 3 (three) times a day (Patient not taking: Reported on 11/9/2021 ), Disp: 225 mL, Rfl: 0    nystatin (MYCOSTATIN) powder, Apply topically 2 (two) times a day (Patient not taking: No sig reported), Disp: 60 g, Rfl: 0    omeprazole (PriLOSEC) 20 mg delayed release capsule, Take 1 capsule (20 mg total) by mouth 2 (two) times a day for 14 days, Disp: 28 capsule, Rfl: 0    Review of Systems   Constitutional: Positive for activity change, appetite change and unexpected weight change (weight loss)  HENT: Positive for postnasal drip, rhinorrhea and sneezing  Respiratory: Negative for cough, chest tightness, shortness of breath and wheezing  Cardiovascular: Negative for chest pain, palpitations and leg swelling  Gastrointestinal: Negative for abdominal pain, constipation, diarrhea and nausea  Neurological: Negative for dizziness and headaches  Hematological: Does not bruise/bleed easily  Psychiatric/Behavioral: Negative for dysphoric mood  The patient is not nervous/anxious          Objective:    /84 (BP Location: Left arm, Patient Position: Sitting) Comment (BP Location): forearm  Pulse 70   Temp 97 9 °F (36 6 °C) (Tympanic)   Resp 16   Ht 5' 4" (1 626 m)   Wt (!) 173 kg (380 lb 12 8 oz)   BMI 65 36 kg/m²      Physical Exam  Skin:     Comments: Nasal piercing, multiple tattoos           Recent Results (from the past 672 hour(s))   CBC and differential    Collection Time: 04/27/22 10:18 AM   Result Value Ref Range    WBC 7 32 4 31 - 10 16 Thousand/uL    RBC 4 25 3 81 - 5 12 Million/uL    Hemoglobin 13 1 11 5 - 15 4 g/dL    Hematocrit 41 7 34 8 - 46 1 %    MCV 98 82 - 98 fL    MCH 30 8 26 8 - 34 3 pg    MCHC 31 4 31 4 - 37 4 g/dL    RDW 12 1 11 6 - 15 1 %    MPV 11 2 8 9 - 12 7 fL    Platelets 025 146 - 836 Thousands/uL    nRBC 0 /100 WBCs    Neutrophils Relative 50 43 - 75 %    Immat GRANS % 0 0 - 2 %    Lymphocytes Relative 38 14 - 44 %    Monocytes Relative 7 4 - 12 %    Eosinophils Relative 4 0 - 6 %    Basophils Relative 1 0 - 1 %    Neutrophils Absolute 3 65 1 85 - 7 62 Thousands/µL    Immature Grans Absolute 0 01 0 00 - 0 20 Thousand/uL    Lymphocytes Absolute 2 79 0 60 - 4 47 Thousands/µL    Monocytes Absolute 0 54 0 17 - 1 22 Thousand/µL    Eosinophils Absolute 0 29 0 00 - 0 61 Thousand/µL    Basophils Absolute 0 04 0 00 - 0 10 Thousands/µL   Comprehensive metabolic panel    Collection Time: 04/27/22 10:18 AM   Result Value Ref Range    Sodium 139 136 - 145 mmol/L    Potassium 4 3 3 5 - 5 3 mmol/L    Chloride 107 100 - 108 mmol/L    CO2 28 21 - 32 mmol/L    ANION GAP 4 4 - 13 mmol/L    BUN 9 5 - 25 mg/dL    Creatinine 0 76 0 60 - 1 30 mg/dL    Glucose, Fasting 87 65 - 99 mg/dL    Calcium 9 0 8 3 - 10 1 mg/dL    AST 15 5 - 45 U/L    ALT 33 12 - 78 U/L    Alkaline Phosphatase 54 46 - 116 U/L    Total Protein 7 9 6 4 - 8 2 g/dL    Albumin 3 5 3 5 - 5 0 g/dL    Total Bilirubin 0 68 0 20 - 1 00 mg/dL    eGFR 101 ml/min/1 73sq m

## 2022-05-26 ENCOUNTER — CLINICAL SUPPORT (OUTPATIENT)
Dept: BARIATRICS | Facility: CLINIC | Age: 37
End: 2022-05-26

## 2022-05-26 ENCOUNTER — OFFICE VISIT (OUTPATIENT)
Dept: BARIATRICS | Facility: CLINIC | Age: 37
End: 2022-05-26
Payer: COMMERCIAL

## 2022-05-26 VITALS
WEIGHT: 293 LBS | SYSTOLIC BLOOD PRESSURE: 120 MMHG | TEMPERATURE: 97.3 F | BODY MASS INDEX: 50.02 KG/M2 | HEIGHT: 64 IN | DIASTOLIC BLOOD PRESSURE: 68 MMHG

## 2022-05-26 DIAGNOSIS — G44.209 TENSION-TYPE HEADACHE, NOT INTRACTABLE, UNSPECIFIED CHRONICITY PATTERN: ICD-10-CM

## 2022-05-26 DIAGNOSIS — E66.01 MORBID OBESITY WITH BMI OF 70 AND OVER, ADULT (HCC): Primary | ICD-10-CM

## 2022-05-26 DIAGNOSIS — F33.42 RECURRENT MAJOR DEPRESSIVE DISORDER, IN FULL REMISSION (HCC): ICD-10-CM

## 2022-05-26 DIAGNOSIS — J45.50 SEVERE PERSISTENT ASTHMA WITHOUT COMPLICATION: ICD-10-CM

## 2022-05-26 DIAGNOSIS — K21.9 GASTROESOPHAGEAL REFLUX DISEASE WITHOUT ESOPHAGITIS: ICD-10-CM

## 2022-05-26 DIAGNOSIS — E66.01 MORBID (SEVERE) OBESITY DUE TO EXCESS CALORIES (HCC): Primary | ICD-10-CM

## 2022-05-26 PROCEDURE — 1036F TOBACCO NON-USER: CPT | Performed by: SURGERY

## 2022-05-26 PROCEDURE — RECHECK: Performed by: DIETITIAN, REGISTERED

## 2022-05-26 PROCEDURE — 99213 OFFICE O/P EST LOW 20 MIN: CPT | Performed by: SURGERY

## 2022-05-26 RX ORDER — GABAPENTIN 300 MG/1
300 CAPSULE ORAL ONCE
Status: CANCELLED | OUTPATIENT
Start: 2022-06-14 | End: 2022-05-26

## 2022-05-26 RX ORDER — CELECOXIB 200 MG/1
200 CAPSULE ORAL ONCE
Status: CANCELLED | OUTPATIENT
Start: 2022-06-14 | End: 2022-05-26

## 2022-05-26 RX ORDER — SCOLOPAMINE TRANSDERMAL SYSTEM 1 MG/1
1 PATCH, EXTENDED RELEASE TRANSDERMAL ONCE
Status: CANCELLED | OUTPATIENT
Start: 2022-06-14 | End: 2022-05-26

## 2022-05-26 RX ORDER — ENOXAPARIN SODIUM 100 MG/ML
40 INJECTION SUBCUTANEOUS
Status: CANCELLED | OUTPATIENT
Start: 2022-06-15 | End: 2022-06-16

## 2022-05-26 RX ORDER — ACETAMINOPHEN 325 MG/1
975 TABLET ORAL ONCE
Status: CANCELLED | OUTPATIENT
Start: 2022-06-14 | End: 2022-05-26

## 2022-05-26 NOTE — PROGRESS NOTES
Weight Management Nutrition Class         Bariatric Surgeon: Dr Marisol Brady    Surgery: pre op sleeve gastrectomy     Class: Pre Op Class     Topics discussed today include:     Pt attended pre-op education session  Standardized packet of information for bariatric surgery was sent via email and was reviewed with pt  Importance of lifestyle change and development of regular exercise routine stressed  Pt given the opportunity to ask questions  Ensure pre-surgery drink instructions were given  Questions were answered  Pt verbalized understanding of all information provided  Pt appeared prepared for upcoming surgery  Pt  educated on two-week pre operative liver shrinking diet  Pt understands that the diet needs to be followed for 2 weeks prior to surgery  Handout reviewed  Emphasized the need to drink 80 ounces of fluid per day while on the diet  Reviewed pre-op ERAS drink, post-operative clear liquid, full liquid, and pureed diet, post-operative nutrition rules and facts, and post-operative bariatric multivitamin/mineral recommendations and brand comparison  Contact information provided for any questions/concerns  Patient was able to verbalize basic diet (protein, fluid, vitamin and mineral) recommendations and possible nutrition-related complications   Yes

## 2022-05-26 NOTE — H&P (VIEW-ONLY)
BARIATRIC H&P - BARIATRIC SURGERY  El Brizuela 40 y o  female MRN: [id number]  Unit/Bed#:  Encounter: [id number]      HPI:  El Brizuela is a 40 y o  female who presents with a long-standing history of morbid obesity  She was found to be a good candidate to undergo a bariatric operation [facility]  She is here today to discuss details of her surgery  Review of Systems   All other systems reviewed and are negative  Historical Information   Past Medical History:   Diagnosis Date    Allergic rhinitis     Asthma     Chronic pain     COVID-19 3/26/2021    Cutaneous abscess of back excluding buttocks 4/6/2021    Depression     Insomnia     Major depressive disorder, recurrent episode, severe (HCC)     Mild intermittent asthma with exacerbation 3/23/2021    Obesity, Class III, BMI 40-49 9 (morbid [facility]) [facility] 7/2/2012    Seasonal allergies      Past Surgical History:   Procedure Laterality Date    ABCESS DRAINAGE      Incision and dranage of skin abscess    CHOLECYSTECTOMY      UPPER GASTROINTESTINAL ENDOSCOPY       Social History   Social History     Substance and Sexual Activity   Alcohol Use No     Social History     Substance and Sexual Activity   Drug Use No     Social History     Tobacco Use   Smoking Status Former Smoker   Smokeless Tobacco Never Used     Family History: non-contributory    Meds/Allergies   all medications and allergies reviewed  Allergies   Allergen Reactions    Ambrosia Artemisiifolia (Ragweed) Skin Test     Dust Mite Extract     Pollen Extract        Objective     Current Vitals:   Blood Pressure: 120/68 (05/26/22 1345)  Temperature: (!) 97 3 °F (36 3 °C) (05/26/22 1345)  Temp Source: Tympanic (05/26/22 1345)  Height: 5' 4" (162 6 cm) (05/26/22 1345)  Weight - Scale: (!) 173 kg (381 lb) (05/26/22 1345)      Invasive Devices  Report    None                 Physical Exam  Vitals and nursing note reviewed  Constitutional:       General: She is not in acute distress  Appearance: Normal appearance  She is well-developed  She is not diaphoretic  HENT:      Head: Normocephalic and atraumatic  Nose: Nose normal    Eyes:      General: No scleral icterus  Right eye: No discharge  Left eye: No discharge  Conjunctiva/sclera: Conjunctivae normal    Cardiovascular:      Rate and Rhythm: Normal rate and regular rhythm  Heart sounds: Normal heart sounds  Pulmonary:      Effort: Pulmonary effort is normal  No respiratory distress  Breath sounds: Normal breath sounds  No stridor  No wheezing or rales  Chest:      Chest wall: No tenderness  Abdominal:      General: Bowel sounds are normal       Palpations: Abdomen is soft  Tenderness: There is no abdominal tenderness  There is no guarding or rebound  Comments: Abdomen is obese, soft and benign  Well-healed incisions from laparoscopic cholecystectomy   Musculoskeletal:         General: No deformity  Normal range of motion  Cervical back: Normal range of motion and neck supple  Lymphadenopathy:      Cervical: No cervical adenopathy  Skin:     General: Skin is warm and dry  Findings: No erythema or rash  Neurological:      Mental Status: She is alert and oriented to person, place, and time  Psychiatric:         Behavior: Behavior normal          Thought Content: Thought content normal          Judgment: Judgment normal          Lab Results: I have personally reviewed pertinent lab results  Imaging: I have personally reviewed pertinent reports  EKG, Pathology, and Other Studies: I have personally reviewed pertinent reports  The endoscopy showed gastritis  The biopsies revealed  Final Diagnosis  A  Stomach, Biopsy:  - Helicobacter pylori chronic active gastritis  - Negative for intestinal metaplasia or dysplasia  - Helicobacter pylori is identified on H&E stained slides       She has completed the treatment for H pylori already    Assessment/PLAN:    40 y o  female morbidly obese found to be a good candidate to undergo a weight loss operation upon being enrolled here at the [facility]     Patient has a long history of morbid obesity and is presenting to discuss the surgical weight loss options  Despite the patient best efforts patient was unable to lose any meaningful or sustainable weight using nonsurgical means  We had a long discussion regarding all the surgical weight-loss options at our disposal at this point and reviewed the risks and benefits of each procedure in details as it relates to her age, BMI and medical conditions  She has been pre certified to undergo a Laparoscopic sleeve gastrectomy     ++++++++++++++++++++++++++++++  Given her that her starting BMI was over 75 we are performing the sleeve gastrectomy as a first stage with a plan to convert her to either gastric bypass or duodenal switch in the future  ++++++++++++++++++++++++++++++    Here today to review her pre op test results  Has been medically cleared for the procedure  I have discussed with her at length the risks and benefits of the operation and reiterated the components of our multidisciplinary program and the importance of compliance and follow up in the post operative period  Although there is a great statistical chance of improvement or even resolution of most of her associated comorbidities, the results vary from patient to patient and they largely depend on her commitment  The patient was also instructed with regards to the importance of behavior modification, nutritional counseling, support meeting attendance and lifestyle changes that are important to ensure success  She was given the opportunity to ask questions and I have answered all of them       I have addressed with the patient the level of CODE STATUS for this hospital stay and after explaining the different options currently she wishes to be a Level I  She understands and wishes to proceed  She has lost 60 lb already about will continue to lose      Marisol Brady MD  5/26/2022  2:02 PM

## 2022-05-26 NOTE — H&P
BARIATRIC H&P - BARIATRIC SURGERY  Kale Smith 40 y o  female MRN: [id number]  Unit/Bed#:  Encounter: [id number]      HPI:  Kale Smith is a 40 y o  female who presents with a long-standing history of morbid obesity  She was found to be a good candidate to undergo a bariatric operation upon [facility]  She is here today to discuss details of her surgery  Review of Systems   All other systems reviewed and are negative  Historical Information   Past Medical History:   Diagnosis Date    Allergic rhinitis     Asthma     Chronic pain     COVID-19 3/26/2021    Cutaneous abscess of back excluding buttocks 4/6/2021    Depression     Insomnia     Major depressive disorder, recurrent episode, severe (HCC)     Mild intermittent asthma with exacerbation 3/23/2021    Obesity, Class III, BMI 40-49 9 (morbid obesity) ([facility] [address] ) 7/2/2012    Seasonal allergies      Past Surgical History:   Procedure Laterality Date    ABCESS DRAINAGE      Incision and dranage of skin abscess    CHOLECYSTECTOMY      UPPER GASTROINTESTINAL ENDOSCOPY       Social History   Social History     Substance and Sexual Activity   Alcohol Use No     Social History     Substance and Sexual Activity   Drug Use No     Social History     Tobacco Use   Smoking Status Former Smoker   Smokeless Tobacco Never Used     Family History: non-contributory    Meds/Allergies   all medications and allergies reviewed  Allergies   Allergen Reactions    Ambrosia Artemisiifolia (Ragweed) Skin Test     Dust Mite Extract     Pollen Extract        Objective     Current Vitals:   Blood Pressure: 120/68 (05/26/22 1345)  Temperature: (!) 97 3 °F (36 3 °C) (05/26/22 1345)  Temp Source: Tympanic (05/26/22 1345)  Height: 5' 4" (162 6 cm) (05/26/22 1345)  Weight - Scale: (!) 173 kg (381 lb) (05/26/22 1345)      Invasive Devices  Report    None                 Physical Exam  Vitals and nursing note reviewed  Regarding: [place]-pre surgery, broke tooth questions  ----- Message from Estelle Max sent at 1/2/2021  8:23 AM CST -----  Patient Name: Kaitlyn Atkinson    Full Name of Provider seen for current symptoms:Aj Roth    Pregnant (If Yes, how long?):na    Symptoms: pre surgery, broke tooth questions    Do you or any of your household members have the following symptoms:  Fever >100.0#F or >38.0#C: No    New or worsening cough, shortness of breath, sore throat, congestion, or runny nose: No    New onset of nausea, vomiting or diarrhea: No    New onset of loss of taste or smell, chills, repeated shaking with chills, muscle pain, or headache: No    Have you, a household member, or another person you have been in contact with tested positive for COVID-19 in the last 14 days?: No    Call Back #:[phone]    Call Center Account # [id number]    Which State are you currently located in? (enter State name in Summary field):[place]    Please update the Demographics section with the patients permanent resident address     Emergent COVID-19 Symptoms requiring Nurse Triage:  Trouble breathing, Persistent pain or pressure in the chest, New confusion, Inability to wake or stay awake, Bluish lips or face       Constitutional:       General: She is not in acute distress  Appearance: Normal appearance  She is well-developed  She is not diaphoretic  HENT:      Head: Normocephalic and atraumatic  Nose: Nose normal    Eyes:      General: No scleral icterus  Right eye: No discharge  Left eye: No discharge  Conjunctiva/sclera: Conjunctivae normal    Cardiovascular:      Rate and Rhythm: Normal rate and regular rhythm  Heart sounds: Normal heart sounds  Pulmonary:      Effort: Pulmonary effort is normal  No respiratory distress  Breath sounds: Normal breath sounds  No stridor  No wheezing or rales  Chest:      Chest wall: No tenderness  Abdominal:      General: Bowel sounds are normal       Palpations: Abdomen is soft  Tenderness: There is no abdominal tenderness  There is no guarding or rebound  Comments: Abdomen is obese, soft and benign  Well-healed incisions from laparoscopic cholecystectomy   Musculoskeletal:         General: No deformity  Normal range of motion  Cervical back: Normal range of motion and neck supple  Lymphadenopathy:      Cervical: No cervical adenopathy  Skin:     General: Skin is warm and dry  Findings: No erythema or rash  Neurological:      Mental Status: She is alert and oriented to person, place, and time  Psychiatric:         Behavior: Behavior normal          Thought Content: Thought content normal          Judgment: Judgment normal          Lab Results: I have personally reviewed pertinent lab results  Imaging: I have personally reviewed pertinent reports  EKG, Pathology, and Other Studies: I have personally reviewed pertinent reports  The endoscopy showed gastritis  The biopsies revealed  Final Diagnosis  A  Stomach, Biopsy:  - Helicobacter pylori chronic active gastritis  - Negative for intestinal metaplasia or dysplasia  - Helicobacter pylori is identified on H&E stained slides       She has completed the treatment for H pylori already    Assessment/PLAN:    40 y o  female morbidly obese found to be a good candidate to undergo a weight loss operation upon being enrolled here at the [facility]     Patient has a long history of morbid obesity and is presenting to discuss the surgical weight loss options  Despite the patient best efforts patient was unable to lose any meaningful or sustainable weight using nonsurgical means  We had a long discussion regarding all the surgical weight-loss options at our disposal at this point and reviewed the risks and benefits of each procedure in details as it relates to her age, BMI and medical conditions  She has been pre certified to undergo a Laparoscopic sleeve gastrectomy     ++++++++++++++++++++++++++++++  Given her that her starting BMI was over 75 we are performing the sleeve gastrectomy as a first stage with a plan to convert her to either gastric bypass or duodenal switch in the future  ++++++++++++++++++++++++++++++    Here today to review her pre op test results  Has been medically cleared for the procedure  I have discussed with her at length the risks and benefits of the operation and reiterated the components of our multidisciplinary program and the importance of compliance and follow up in the post operative period  Although there is a great statistical chance of improvement or even resolution of most of her associated comorbidities, the results vary from patient to patient and they largely depend on her commitment  The patient was also instructed with regards to the importance of behavior modification, nutritional counseling, support meeting attendance and lifestyle changes that are important to ensure success  She was given the opportunity to ask questions and I have answered all of them       I have addressed with the patient the level of CODE STATUS for this hospital stay and after explaining the different options currently she wishes to be a Level I  She understands and wishes to proceed  She has lost 60 lb already about will continue to lose      Hood MD Tree  5/26/2022  2:02 PM

## 2022-05-27 ENCOUNTER — TELEPHONE (OUTPATIENT)
Dept: PULMONOLOGY | Facility: CLINIC | Age: 37
End: 2022-05-27

## 2022-05-27 DIAGNOSIS — E66.01 MORBID (SEVERE) OBESITY DUE TO EXCESS CALORIES (HCC): Primary | ICD-10-CM

## 2022-05-27 NOTE — TELEPHONE ENCOUNTER
Patient called back, she stated she cannot do any appts after 9AM because of her job  I did offer her the next opening with an AP of august 30th but she stated she is not waiting that long as Dr Villegas wanted to see her before the summer  I advised her I would reach out to you to see if there is anything else we can do   Please advise

## 2022-05-27 NOTE — TELEPHONE ENCOUNTER
lvm advising pt appointment for 6/7 is being moved to 6/9 at 10 am with Dr Naeem Pual I asked that she call the office back to confirm this appointment

## 2022-05-31 ENCOUNTER — ANNUAL EXAM (OUTPATIENT)
Dept: OBGYN CLINIC | Facility: CLINIC | Age: 37
End: 2022-05-31
Payer: COMMERCIAL

## 2022-05-31 VITALS — HEIGHT: 64 IN | WEIGHT: 293 LBS | BODY MASS INDEX: 50.02 KG/M2

## 2022-05-31 DIAGNOSIS — Z01.419 WOMEN'S ANNUAL ROUTINE GYNECOLOGICAL EXAMINATION: Primary | ICD-10-CM

## 2022-05-31 DIAGNOSIS — Z11.51 SCREENING FOR HPV (HUMAN PAPILLOMAVIRUS): ICD-10-CM

## 2022-05-31 DIAGNOSIS — Z12.4 SCREENING FOR MALIGNANT NEOPLASM OF THE CERVIX: ICD-10-CM

## 2022-05-31 PROCEDURE — G0476 HPV COMBO ASSAY CA SCREEN: HCPCS | Performed by: OBSTETRICS & GYNECOLOGY

## 2022-05-31 PROCEDURE — G0145 SCR C/V CYTO,THINLAYER,RESCR: HCPCS | Performed by: OBSTETRICS & GYNECOLOGY

## 2022-05-31 PROCEDURE — 3008F BODY MASS INDEX DOCD: CPT | Performed by: SURGERY

## 2022-05-31 PROCEDURE — S0610 ANNUAL GYNECOLOGICAL EXAMINA: HCPCS | Performed by: OBSTETRICS & GYNECOLOGY

## 2022-05-31 RX ORDER — OMEPRAZOLE 20 MG/1
20 CAPSULE, DELAYED RELEASE ORAL DAILY
Qty: 30 CAPSULE | Refills: 3 | Status: ON HOLD | OUTPATIENT
Start: 2022-06-15 | End: 2022-06-19 | Stop reason: SDUPTHER

## 2022-05-31 RX ORDER — OXYCODONE HYDROCHLORIDE 5 MG/1
5 TABLET ORAL EVERY 4 HOURS PRN
Qty: 10 TABLET | Refills: 0 | Status: SHIPPED | OUTPATIENT
Start: 2022-06-15

## 2022-05-31 RX ORDER — ENOXAPARIN SODIUM 100 MG/ML
40 INJECTION SUBCUTANEOUS DAILY
Qty: 5.2 ML | Refills: 0 | Status: ON HOLD | OUTPATIENT
Start: 2022-06-15 | End: 2022-06-16 | Stop reason: SDUPTHER

## 2022-05-31 NOTE — PROGRESS NOTES
Subjective      Villa Hull is a 40 y o  female who presents for annual well woman exam  Periods are regular every 28-30 days, lasting 6 days  No intermenstrual bleeding, spotting, or discharge  Current contraception: nexplanon  History of abnormal Pap smear: no  Family history of uterine or ovarian cancer: no    Family history of breast cancer: no    Menstrual History:  OB History        1    Para   1    Term   1            AB        Living   1       SAB        IAB        Ectopic        Multiple        Live Births                      Patient's last menstrual period was 2022  The following portions of the patient's history were reviewed and updated as appropriate: allergies, current medications, past family history, past medical history, past social history, past surgical history and problem list     Review of Systems  Review of Systems   Constitutional: Negative for activity change, appetite change, chills, fatigue and fever  Respiratory: Negative for cough and shortness of breath  Cardiovascular: Negative for chest pain, palpitations and leg swelling  Gastrointestinal: Negative for abdominal pain, constipation, diarrhea, nausea and vomiting  Genitourinary: Negative for difficulty urinating, dysuria, flank pain, frequency, hematuria, urgency and vaginal discharge  Neurological: Negative for dizziness and headaches  Psychiatric/Behavioral: Negative for confusion        Decrease libido LIFESTYLE MODIFICATION discussed with patient partner relationship if symptoms not better after her gastric sleeve surgery to return to office to discuss medical management    Objective      Ht 5' 4" (1 626 m)   Wt (!) 178 kg (393 lb)   LMP 2022   BMI 67 46 kg/m²     Physical Exam  OBGyn Exam     General:   alert and oriented, in no acute distress, alert, appears stated age and cooperative   Heart: regular rate and rhythm, S1, S2 normal, no murmur, click, rub or gallop   Lungs: clear to auscultation bilaterally   Abdomen: soft, non-tender, without masses or organomegaly   Vulva: normal   Vagina: normal mucosa, normal discharge   Cervix: no cervical motion tenderness and no lesions   Uterus: normal size   Adnexa:  Breast Exam:  normal adnexa  breasts appear normal, no suspicious masses, no skin or nipple changes or axillary nodes  Assessment      @well woman@   41 yo female   Annual exam   nexplanon due for removal   1   DECREASE Libido  Elevated BMI  Plan   Pap/hpv  Diet/exercsie  Ca/vit D  RTO FOR Nazia Hernandez   All questions answered  There are no Patient Instructions on file for this visit

## 2022-06-01 ENCOUNTER — PROCEDURE VISIT (OUTPATIENT)
Dept: OBGYN CLINIC | Facility: CLINIC | Age: 37
End: 2022-06-01
Payer: COMMERCIAL

## 2022-06-01 VITALS
SYSTOLIC BLOOD PRESSURE: 128 MMHG | BODY MASS INDEX: 50.02 KG/M2 | WEIGHT: 293 LBS | HEIGHT: 64 IN | DIASTOLIC BLOOD PRESSURE: 82 MMHG

## 2022-06-01 DIAGNOSIS — Z30.46 ENCOUNTER FOR REMOVAL AND REINSERTION OF NEXPLANON: Primary | ICD-10-CM

## 2022-06-01 NOTE — PRE-PROCEDURE INSTRUCTIONS
Pre-Surgery Instructions:   Medication Instructions    albuterol (2 5 mg/3 mL) 0 083 % nebulizer solution Uses PRN- OK to take day of surgery    etonogestrel (NEXPLANON) subdermal implant birth control implant    fexofenadine-pseudoephedrine (ALLEGRA-D 24) 180-240 MG per 24 hr tablet Hold day of surgery   fluticasone-vilanterol (Breo Ellipta) 200-25 MCG/INH inhaler Take day of surgery   ipratropium (ATROVENT) 0 02 % nebulizer solution Uses PRN- OK to take day of surgery    montelukast (SINGULAIR) 10 mg tablet Take night before surgery   Verbal pre-op instructions given to pt via phone  Pt verbalizes understanding

## 2022-06-02 LAB
HPV HR 12 DNA CVX QL NAA+PROBE: NEGATIVE
HPV16 DNA CVX QL NAA+PROBE: NEGATIVE
HPV18 DNA CVX QL NAA+PROBE: NEGATIVE

## 2022-06-02 PROCEDURE — 11983 REMOVE/INSERT DRUG IMPLANT: CPT | Performed by: OBSTETRICS & GYNECOLOGY

## 2022-06-02 NOTE — PROGRESS NOTES
Assessment/Plan:     Diagnoses and all orders for this visit:    Encounter for removal and reinsertion of Nexplanon  -     etonogestrel (NEXPLANON) subdermal implant 68 mg       41 yo female   nexplanon due for removal /desire replacement  1   DECREASE Libido  Elevated BMI  Plan   Nexplanon removed   Nexplanon inserted   Return to office for annual exam    Subjective:      Patient ID: Olivia Mendoza is a 40 y o  female  HPI  42-year-old female presents to the office today for Nexplanon replacement procedure explained and discussed with patient patient is satisfied with the Nexplanon  The following portions of the patient's history were reviewed and updated as appropriate: allergies, current medications, past family history, past medical history, past social history, past surgical history and problem list     Review of Systems      Objective:      /82 (BP Location: Left arm, Patient Position: Sitting, Cuff Size: Adult)   Ht 5' 4" (1 626 m)   Wt (!) 178 kg (393 lb)   LMP 2022   BMI 67 46 kg/m²          Physical Exam    Universal Protocol:  Consent: Verbal consent obtained  Risks and benefits: risks, benefits and alternatives were discussed  Consent given by: patient  Time out: Immediately prior to procedure a "time out" was called to verify the correct patient, procedure, equipment, support staff and site/side marked as required    Patient understanding: patient states understanding of the procedure being performed  Patient consent: the patient's understanding of the procedure matches consent given  Procedure consent: procedure consent matches procedure scheduled  Relevant documents: relevant documents present and verified  Patient identity confirmed: verbally with patient    Remove and insert drug implant    Date/Time: 2022 2:01 PM  Performed by: Waldemar Blackmon MD  Authorized by: Waldemar Blackmon MD     Indication:     Indication: Insertion of non-biodegradable drug delivery implant Indication comment:    nexplanon replacement  Pre-procedure:     Pre-procedure timeout performed: yes      Local anesthetic:  Lidocaine with epinephrine    The site was cleaned and prepped in a sterile fashion: yes    Procedure:     Procedure:  Removal with reinsertion    Small stab incision was made in arm: yes      Left/right:  Right    Preloaded contraceptive capsule trocar was placed subdermally: yes      Visualization of implant was obtained: yes      Contraceptive capsule was inserted and trocar removed: yes      Visualization of notch in stylet and palpation of device: yes      Palpation confirms placement by provider and patient: yes      Site was closed with steri-strips and pressure bandage applied: yes    Comments:      Patient placed in supine position on exam table, R arm extended and externally rotated, L hand behind her head   Presence of Nexplanon confirmed in R arm   Site for incision marked   Area cleaned with alcohol swab, and 1 mL of lidocaine without epinephrine injected at site   Area cleaned again with betadine swab   1 cm stab incision made and extended down to subdermal layer   Head of Nexplanon exteriorized and grasped with HEMOSTAT NEXPLANON NOTED TO BE IMBEDDED gentle pulling and dissection using the knife of the tissue surrounding was perform Nexplanon was removed successfully    in its entirety and shown to the patient   Pressure applied to incision; good hemostasis achieved    Then Nexplanon insertion was performed as prescribed above

## 2022-06-07 ENCOUNTER — TELEPHONE (OUTPATIENT)
Dept: BARIATRICS | Facility: CLINIC | Age: 37
End: 2022-06-07

## 2022-06-07 LAB
LAB AP GYN PRIMARY INTERPRETATION: NORMAL
LAB AP LMP: NORMAL
Lab: NORMAL

## 2022-06-07 NOTE — TELEPHONE ENCOUNTER
Pre-op call was made to patient,  to follow up on how they are doing and to remind them to continue with all medical and dietary directions that were given at pre-op class regarding liver shrinking diet and hydration  They were encouraged to purchase all vitamins and protein shakes for post op use as well as to begin Miralax three days prior to surgery as directed in Section 6 of their manual   They were reminded of the Ensure Pre-surgery drinks protocol and to bring their completed yellow form with them to surgery as well as their CPAP-BiPAP machine if they use one  Lastly, they were informed that they would be weighed the morning of surgery and to give the office a call if they had any further questions or concerns

## 2022-06-10 ENCOUNTER — ANESTHESIA EVENT (OUTPATIENT)
Dept: PERIOP | Facility: HOSPITAL | Age: 37
DRG: 621 | End: 2022-06-10
Payer: COMMERCIAL

## 2022-06-14 ENCOUNTER — ANESTHESIA (OUTPATIENT)
Dept: PERIOP | Facility: HOSPITAL | Age: 37
DRG: 621 | End: 2022-06-14
Payer: COMMERCIAL

## 2022-06-14 ENCOUNTER — HOSPITAL ENCOUNTER (INPATIENT)
Facility: HOSPITAL | Age: 37
LOS: 4 days | Discharge: HOME | DRG: 621 | End: 2022-06-19
Attending: SURGERY | Admitting: SURGERY
Payer: COMMERCIAL

## 2022-06-14 DIAGNOSIS — J45.50 SEVERE PERSISTENT ASTHMA WITHOUT COMPLICATION: ICD-10-CM

## 2022-06-14 DIAGNOSIS — J45.909 ASTHMA: ICD-10-CM

## 2022-06-14 DIAGNOSIS — J30.9 ALLERGIC RHINITIS, UNSPECIFIED SEASONALITY, UNSPECIFIED TRIGGER: ICD-10-CM

## 2022-06-14 DIAGNOSIS — I10 HYPERTENSION, UNSPECIFIED TYPE: ICD-10-CM

## 2022-06-14 DIAGNOSIS — E66.01 MORBID (SEVERE) OBESITY DUE TO EXCESS CALORIES (HCC): ICD-10-CM

## 2022-06-14 DIAGNOSIS — E66.01 MORBID OBESITY (HCC): Primary | ICD-10-CM

## 2022-06-14 LAB
EXT PREGNANCY TEST URINE: NEGATIVE
EXT. CONTROL: NORMAL

## 2022-06-14 PROCEDURE — S2900 ROBOTIC SURGICAL SYSTEM: HCPCS | Performed by: SURGERY

## 2022-06-14 PROCEDURE — C9290 INJ, BUPIVACAINE LIPOSOME: HCPCS | Performed by: SURGERY

## 2022-06-14 PROCEDURE — 88342 IMHCHEM/IMCYTCHM 1ST ANTB: CPT | Performed by: PATHOLOGY

## 2022-06-14 PROCEDURE — 0DB64Z3 EXCISION OF STOMACH, PERCUTANEOUS ENDOSCOPIC APPROACH, VERTICAL: ICD-10-PCS | Performed by: SURGERY

## 2022-06-14 PROCEDURE — 88307 TISSUE EXAM BY PATHOLOGIST: CPT | Performed by: PATHOLOGY

## 2022-06-14 PROCEDURE — 43775 LAP SLEEVE GASTRECTOMY: CPT | Performed by: PHYSICIAN ASSISTANT

## 2022-06-14 PROCEDURE — 8E0W8CZ ROBOTIC ASSISTED PROCEDURE OF TRUNK REGION, VIA NATURAL OR ARTIFICIAL OPENING ENDOSCOPIC: ICD-10-PCS | Performed by: SURGERY

## 2022-06-14 PROCEDURE — C1781 MESH (IMPLANTABLE): HCPCS | Performed by: SURGERY

## 2022-06-14 PROCEDURE — 0DJ08ZZ INSPECTION OF UPPER INTESTINAL TRACT, VIA NATURAL OR ARTIFICIAL OPENING ENDOSCOPIC: ICD-10-PCS | Performed by: SURGERY

## 2022-06-14 PROCEDURE — 43775 LAP SLEEVE GASTRECTOMY: CPT | Performed by: SURGERY

## 2022-06-14 PROCEDURE — 81025 URINE PREGNANCY TEST: CPT | Performed by: ANESTHESIOLOGY

## 2022-06-14 DEVICE — SEAMGUARD STPL REINF INTUITIVE SUREFORM 60 BLACK: Type: IMPLANTABLE DEVICE | Status: FUNCTIONAL

## 2022-06-14 DEVICE — SEAMGUARD STPL REINF INTUITIVE SUREFORM 60 GREEN: Type: IMPLANTABLE DEVICE | Status: FUNCTIONAL

## 2022-06-14 RX ORDER — DEXAMETHASONE SODIUM PHOSPHATE 10 MG/ML
INJECTION, SOLUTION INTRAMUSCULAR; INTRAVENOUS AS NEEDED
Status: DISCONTINUED | OUTPATIENT
Start: 2022-06-14 | End: 2022-06-14

## 2022-06-14 RX ORDER — DEXMEDETOMIDINE HYDROCHLORIDE 100 UG/ML
INJECTION, SOLUTION INTRAVENOUS AS NEEDED
Status: DISCONTINUED | OUTPATIENT
Start: 2022-06-14 | End: 2022-06-14

## 2022-06-14 RX ORDER — ENOXAPARIN SODIUM 100 MG/ML
40 INJECTION SUBCUTANEOUS
Status: COMPLETED | OUTPATIENT
Start: 2022-06-14 | End: 2022-06-14

## 2022-06-14 RX ORDER — MIDAZOLAM HYDROCHLORIDE 2 MG/2ML
INJECTION, SOLUTION INTRAMUSCULAR; INTRAVENOUS AS NEEDED
Status: DISCONTINUED | OUTPATIENT
Start: 2022-06-14 | End: 2022-06-14

## 2022-06-14 RX ORDER — DEXAMETHASONE SODIUM PHOSPHATE 10 MG/ML
10 INJECTION, SOLUTION INTRAMUSCULAR; INTRAVENOUS EVERY 8 HOURS SCHEDULED
Status: DISCONTINUED | OUTPATIENT
Start: 2022-06-14 | End: 2022-06-19 | Stop reason: HOSPADM

## 2022-06-14 RX ORDER — ACETAMINOPHEN 325 MG/1
975 TABLET ORAL EVERY 8 HOURS
Status: DISCONTINUED | OUTPATIENT
Start: 2022-06-14 | End: 2022-06-19 | Stop reason: HOSPADM

## 2022-06-14 RX ORDER — PROMETHAZINE HYDROCHLORIDE 25 MG/ML
12.5 INJECTION, SOLUTION INTRAMUSCULAR; INTRAVENOUS ONCE
Status: COMPLETED | OUTPATIENT
Start: 2022-06-14 | End: 2022-06-14

## 2022-06-14 RX ORDER — FENTANYL CITRATE 50 UG/ML
INJECTION, SOLUTION INTRAMUSCULAR; INTRAVENOUS AS NEEDED
Status: DISCONTINUED | OUTPATIENT
Start: 2022-06-14 | End: 2022-06-14

## 2022-06-14 RX ORDER — LIDOCAINE HYDROCHLORIDE 20 MG/ML
INJECTION, SOLUTION EPIDURAL; INFILTRATION; INTRACAUDAL; PERINEURAL AS NEEDED
Status: DISCONTINUED | OUTPATIENT
Start: 2022-06-14 | End: 2022-06-14

## 2022-06-14 RX ORDER — MAGNESIUM HYDROXIDE 1200 MG/15ML
LIQUID ORAL AS NEEDED
Status: DISCONTINUED | OUTPATIENT
Start: 2022-06-14 | End: 2022-06-14 | Stop reason: HOSPADM

## 2022-06-14 RX ORDER — ONDANSETRON 2 MG/ML
4 INJECTION INTRAMUSCULAR; INTRAVENOUS EVERY 6 HOURS PRN
Status: DISCONTINUED | OUTPATIENT
Start: 2022-06-14 | End: 2022-06-19 | Stop reason: HOSPADM

## 2022-06-14 RX ORDER — OXYCODONE HCL 5 MG/5 ML
10 SOLUTION, ORAL ORAL EVERY 4 HOURS PRN
Status: DISCONTINUED | OUTPATIENT
Start: 2022-06-14 | End: 2022-06-19 | Stop reason: HOSPADM

## 2022-06-14 RX ORDER — HYDROMORPHONE HCL/PF 1 MG/ML
SYRINGE (ML) INJECTION AS NEEDED
Status: DISCONTINUED | OUTPATIENT
Start: 2022-06-14 | End: 2022-06-14

## 2022-06-14 RX ORDER — CELECOXIB 200 MG/1
200 CAPSULE ORAL ONCE
Status: COMPLETED | OUTPATIENT
Start: 2022-06-14 | End: 2022-06-14

## 2022-06-14 RX ORDER — PROMETHAZINE HYDROCHLORIDE 25 MG/ML
25 INJECTION, SOLUTION INTRAMUSCULAR; INTRAVENOUS EVERY 6 HOURS PRN
Status: DISCONTINUED | OUTPATIENT
Start: 2022-06-14 | End: 2022-06-19 | Stop reason: HOSPADM

## 2022-06-14 RX ORDER — DIPHENHYDRAMINE HCL 25 MG
25 TABLET ORAL EVERY 8 HOURS PRN
Status: DISCONTINUED | OUTPATIENT
Start: 2022-06-14 | End: 2022-06-19 | Stop reason: HOSPADM

## 2022-06-14 RX ORDER — ONDANSETRON 2 MG/ML
INJECTION INTRAMUSCULAR; INTRAVENOUS AS NEEDED
Status: DISCONTINUED | OUTPATIENT
Start: 2022-06-14 | End: 2022-06-14

## 2022-06-14 RX ORDER — ENOXAPARIN SODIUM 100 MG/ML
40 INJECTION SUBCUTANEOUS EVERY 24 HOURS
Status: DISCONTINUED | OUTPATIENT
Start: 2022-06-15 | End: 2022-06-19 | Stop reason: HOSPADM

## 2022-06-14 RX ORDER — SCOLOPAMINE TRANSDERMAL SYSTEM 1 MG/1
1 PATCH, EXTENDED RELEASE TRANSDERMAL ONCE
Status: COMPLETED | OUTPATIENT
Start: 2022-06-14 | End: 2022-06-17

## 2022-06-14 RX ORDER — FAMOTIDINE 10 MG/ML
20 INJECTION, SOLUTION INTRAVENOUS 2 TIMES DAILY
Status: DISCONTINUED | OUTPATIENT
Start: 2022-06-14 | End: 2022-06-16 | Stop reason: SDUPTHER

## 2022-06-14 RX ORDER — METOCLOPRAMIDE HYDROCHLORIDE 5 MG/ML
10 INJECTION INTRAMUSCULAR; INTRAVENOUS EVERY 6 HOURS PRN
Status: DISCONTINUED | OUTPATIENT
Start: 2022-06-14 | End: 2022-06-19 | Stop reason: HOSPADM

## 2022-06-14 RX ORDER — MORPHINE SULFATE 4 MG/ML
4 INJECTION, SOLUTION INTRAMUSCULAR; INTRAVENOUS EVERY 4 HOURS PRN
Status: DISCONTINUED | OUTPATIENT
Start: 2022-06-14 | End: 2022-06-19 | Stop reason: HOSPADM

## 2022-06-14 RX ORDER — LABETALOL HYDROCHLORIDE 5 MG/ML
10 INJECTION, SOLUTION INTRAVENOUS ONCE
Status: COMPLETED | OUTPATIENT
Start: 2022-06-14 | End: 2022-06-14

## 2022-06-14 RX ORDER — DEXAMETHASONE SODIUM PHOSPHATE 10 MG/ML
10 INJECTION, SOLUTION INTRAMUSCULAR; INTRAVENOUS EVERY 8 HOURS SCHEDULED
Status: DISCONTINUED | OUTPATIENT
Start: 2022-06-14 | End: 2022-06-14

## 2022-06-14 RX ORDER — DROPERIDOL 2.5 MG/ML
0.62 INJECTION, SOLUTION INTRAMUSCULAR; INTRAVENOUS ONCE
Status: DISCONTINUED | OUTPATIENT
Start: 2022-06-14 | End: 2022-06-14

## 2022-06-14 RX ORDER — ONDANSETRON 2 MG/ML
4 INJECTION INTRAMUSCULAR; INTRAVENOUS ONCE AS NEEDED
Status: DISCONTINUED | OUTPATIENT
Start: 2022-06-14 | End: 2022-06-14 | Stop reason: HOSPADM

## 2022-06-14 RX ORDER — ACETAMINOPHEN 325 MG/1
975 TABLET ORAL ONCE
Status: COMPLETED | OUTPATIENT
Start: 2022-06-14 | End: 2022-06-14

## 2022-06-14 RX ORDER — MEPERIDINE HYDROCHLORIDE 25 MG/ML
12.5 INJECTION INTRAMUSCULAR; INTRAVENOUS; SUBCUTANEOUS
Status: DISCONTINUED | OUTPATIENT
Start: 2022-06-14 | End: 2022-06-14 | Stop reason: HOSPADM

## 2022-06-14 RX ORDER — SODIUM CHLORIDE, SODIUM LACTATE, POTASSIUM CHLORIDE, CALCIUM CHLORIDE 600; 310; 30; 20 MG/100ML; MG/100ML; MG/100ML; MG/100ML
100 INJECTION, SOLUTION INTRAVENOUS CONTINUOUS
Status: DISCONTINUED | OUTPATIENT
Start: 2022-06-14 | End: 2022-06-19 | Stop reason: HOSPADM

## 2022-06-14 RX ORDER — SODIUM CHLORIDE, SODIUM LACTATE, POTASSIUM CHLORIDE, CALCIUM CHLORIDE 600; 310; 30; 20 MG/100ML; MG/100ML; MG/100ML; MG/100ML
125 INJECTION, SOLUTION INTRAVENOUS CONTINUOUS
Status: DISCONTINUED | OUTPATIENT
Start: 2022-06-14 | End: 2022-06-15

## 2022-06-14 RX ORDER — SIMETHICONE 80 MG
80 TABLET,CHEWABLE ORAL 4 TIMES DAILY PRN
Status: DISCONTINUED | OUTPATIENT
Start: 2022-06-14 | End: 2022-06-19 | Stop reason: HOSPADM

## 2022-06-14 RX ORDER — PROPOFOL 10 MG/ML
INJECTION, EMULSION INTRAVENOUS AS NEEDED
Status: DISCONTINUED | OUTPATIENT
Start: 2022-06-14 | End: 2022-06-14

## 2022-06-14 RX ORDER — ONDANSETRON 2 MG/ML
8 INJECTION INTRAMUSCULAR; INTRAVENOUS ONCE
Status: COMPLETED | OUTPATIENT
Start: 2022-06-14 | End: 2022-06-14

## 2022-06-14 RX ORDER — SODIUM CHLORIDE 9 MG/ML
INJECTION, SOLUTION INTRAVENOUS AS NEEDED
Status: DISCONTINUED | OUTPATIENT
Start: 2022-06-14 | End: 2022-06-14 | Stop reason: HOSPADM

## 2022-06-14 RX ORDER — HYDROMORPHONE HCL/PF 1 MG/ML
0.5 SYRINGE (ML) INJECTION
Status: DISCONTINUED | OUTPATIENT
Start: 2022-06-14 | End: 2022-06-14 | Stop reason: HOSPADM

## 2022-06-14 RX ORDER — GABAPENTIN 300 MG/1
300 CAPSULE ORAL ONCE
Status: COMPLETED | OUTPATIENT
Start: 2022-06-14 | End: 2022-06-14

## 2022-06-14 RX ORDER — HYDRALAZINE HYDROCHLORIDE 20 MG/ML
10 INJECTION INTRAMUSCULAR; INTRAVENOUS ONCE
Status: COMPLETED | OUTPATIENT
Start: 2022-06-14 | End: 2022-06-14

## 2022-06-14 RX ORDER — FENTANYL CITRATE/PF 50 MCG/ML
25 SYRINGE (ML) INJECTION
Status: DISCONTINUED | OUTPATIENT
Start: 2022-06-14 | End: 2022-06-14 | Stop reason: HOSPADM

## 2022-06-14 RX ORDER — OXYCODONE HCL 5 MG/5 ML
5 SOLUTION, ORAL ORAL EVERY 4 HOURS PRN
Status: DISCONTINUED | OUTPATIENT
Start: 2022-06-14 | End: 2022-06-19 | Stop reason: HOSPADM

## 2022-06-14 RX ORDER — BUPIVACAINE HYDROCHLORIDE 5 MG/ML
INJECTION, SOLUTION EPIDURAL; INTRACAUDAL AS NEEDED
Status: DISCONTINUED | OUTPATIENT
Start: 2022-06-14 | End: 2022-06-14 | Stop reason: HOSPADM

## 2022-06-14 RX ORDER — ROCURONIUM BROMIDE 10 MG/ML
INJECTION, SOLUTION INTRAVENOUS AS NEEDED
Status: DISCONTINUED | OUTPATIENT
Start: 2022-06-14 | End: 2022-06-14

## 2022-06-14 RX ORDER — ACETAMINOPHEN 160 MG/5ML
975 SUSPENSION, ORAL (FINAL DOSE FORM) ORAL EVERY 8 HOURS
Status: DISCONTINUED | OUTPATIENT
Start: 2022-06-14 | End: 2022-06-19 | Stop reason: HOSPADM

## 2022-06-14 RX ADMIN — FENTANYL CITRATE 50 MCG: 50 INJECTION, SOLUTION INTRAMUSCULAR; INTRAVENOUS at 13:27

## 2022-06-14 RX ADMIN — ACETAMINOPHEN 975 MG: 325 TABLET, FILM COATED ORAL at 11:34

## 2022-06-14 RX ADMIN — SCOPALAMINE 1 PATCH: 1 PATCH, EXTENDED RELEASE TRANSDERMAL at 11:36

## 2022-06-14 RX ADMIN — HYDROMORPHONE HYDROCHLORIDE 0.5 MG: 1 INJECTION, SOLUTION INTRAMUSCULAR; INTRAVENOUS; SUBCUTANEOUS at 16:06

## 2022-06-14 RX ADMIN — SODIUM CHLORIDE, SODIUM LACTATE, POTASSIUM CHLORIDE, AND CALCIUM CHLORIDE: .6; .31; .03; .02 INJECTION, SOLUTION INTRAVENOUS at 14:29

## 2022-06-14 RX ADMIN — LIDOCAINE HYDROCHLORIDE 80 MG: 20 INJECTION, SOLUTION EPIDURAL; INFILTRATION; INTRACAUDAL at 13:27

## 2022-06-14 RX ADMIN — PROPOFOL 350 MG: 10 INJECTION, EMULSION INTRAVENOUS at 13:27

## 2022-06-14 RX ADMIN — TRIMETHOBENZAMIDE HYDROCHLORIDE 200 MG: 100 INJECTION INTRAMUSCULAR at 15:53

## 2022-06-14 RX ADMIN — MIDAZOLAM HYDROCHLORIDE 2 MG: 1 INJECTION, SOLUTION INTRAMUSCULAR; INTRAVENOUS at 13:19

## 2022-06-14 RX ADMIN — ONDANSETRON 4 MG: 2 INJECTION INTRAMUSCULAR; INTRAVENOUS at 15:02

## 2022-06-14 RX ADMIN — PROMETHAZINE HYDROCHLORIDE 12.5 MG: 25 INJECTION INTRAMUSCULAR; INTRAVENOUS at 17:50

## 2022-06-14 RX ADMIN — DEXMEDETOMIDINE HCL 8 MCG: 100 INJECTION INTRAVENOUS at 15:10

## 2022-06-14 RX ADMIN — ONDANSETRON 8 MG: 2 INJECTION INTRAMUSCULAR; INTRAVENOUS at 19:06

## 2022-06-14 RX ADMIN — SODIUM CHLORIDE, SODIUM LACTATE, POTASSIUM CHLORIDE, AND CALCIUM CHLORIDE 125 ML/HR: .6; .31; .03; .02 INJECTION, SOLUTION INTRAVENOUS at 12:00

## 2022-06-14 RX ADMIN — PROMETHAZINE HYDROCHLORIDE 12.5 MG: 25 INJECTION INTRAMUSCULAR; INTRAVENOUS at 16:51

## 2022-06-14 RX ADMIN — SODIUM CHLORIDE, SODIUM LACTATE, POTASSIUM CHLORIDE, AND CALCIUM CHLORIDE 125 ML/HR: .6; .31; .03; .02 INJECTION, SOLUTION INTRAVENOUS at 16:11

## 2022-06-14 RX ADMIN — ENOXAPARIN SODIUM 40 MG: 40 INJECTION SUBCUTANEOUS at 12:10

## 2022-06-14 RX ADMIN — DEXMEDETOMIDINE HCL 8 MCG: 100 INJECTION INTRAVENOUS at 14:32

## 2022-06-14 RX ADMIN — DEXMEDETOMIDINE HCL 8 MCG: 100 INJECTION INTRAVENOUS at 14:14

## 2022-06-14 RX ADMIN — ROCURONIUM BROMIDE 60 MG: 50 INJECTION, SOLUTION INTRAVENOUS at 13:27

## 2022-06-14 RX ADMIN — FENTANYL CITRATE 50 MCG: 50 INJECTION, SOLUTION INTRAMUSCULAR; INTRAVENOUS at 14:09

## 2022-06-14 RX ADMIN — PROMETHAZINE HYDROCHLORIDE 25 MG: 25 INJECTION INTRAMUSCULAR; INTRAVENOUS at 20:27

## 2022-06-14 RX ADMIN — FENTANYL CITRATE 50 MCG: 50 INJECTION, SOLUTION INTRAMUSCULAR; INTRAVENOUS at 14:20

## 2022-06-14 RX ADMIN — SODIUM CHLORIDE, POTASSIUM CHLORIDE, SODIUM LACTATE AND CALCIUM CHLORIDE 100 ML/HR: 600; 310; 30; 20 INJECTION, SOLUTION INTRAVENOUS at 20:20

## 2022-06-14 RX ADMIN — FAMOTIDINE 20 MG: 10 INJECTION INTRAVENOUS at 20:28

## 2022-06-14 RX ADMIN — FENTANYL CITRATE 50 MCG: 50 INJECTION, SOLUTION INTRAMUSCULAR; INTRAVENOUS at 13:53

## 2022-06-14 RX ADMIN — FENTANYL CITRATE 25 MCG: 50 INJECTION, SOLUTION INTRAMUSCULAR; INTRAVENOUS at 15:36

## 2022-06-14 RX ADMIN — HYDROMORPHONE HYDROCHLORIDE 0.5 MG: 1 INJECTION, SOLUTION INTRAMUSCULAR; INTRAVENOUS; SUBCUTANEOUS at 15:55

## 2022-06-14 RX ADMIN — SUGAMMADEX 400 MG: 100 INJECTION, SOLUTION INTRAVENOUS at 15:16

## 2022-06-14 RX ADMIN — ROCURONIUM BROMIDE 20 MG: 50 INJECTION, SOLUTION INTRAVENOUS at 14:06

## 2022-06-14 RX ADMIN — GABAPENTIN 300 MG: 300 CAPSULE ORAL at 11:34

## 2022-06-14 RX ADMIN — DEXAMETHASONE SODIUM PHOSPHATE 10 MG: 10 INJECTION, SOLUTION INTRAMUSCULAR; INTRAVENOUS at 13:41

## 2022-06-14 RX ADMIN — METOCLOPRAMIDE 10 MG: 5 INJECTION, SOLUTION INTRAMUSCULAR; INTRAVENOUS at 23:15

## 2022-06-14 RX ADMIN — ONDANSETRON 4 MG: 2 INJECTION INTRAMUSCULAR; INTRAVENOUS at 13:41

## 2022-06-14 RX ADMIN — CELECOXIB 200 MG: 200 CAPSULE ORAL at 11:34

## 2022-06-14 RX ADMIN — Medication 10 MG: at 18:09

## 2022-06-14 RX ADMIN — HYDRALAZINE HYDROCHLORIDE 10 MG: 20 INJECTION INTRAMUSCULAR; INTRAVENOUS at 16:59

## 2022-06-14 RX ADMIN — CEFAZOLIN SODIUM 3000 MG: 10 INJECTION, POWDER, FOR SOLUTION INTRAVENOUS at 13:42

## 2022-06-14 RX ADMIN — HYDROMORPHONE HYDROCHLORIDE 0.5 MG: 1 INJECTION, SOLUTION INTRAMUSCULAR; INTRAVENOUS; SUBCUTANEOUS at 14:36

## 2022-06-14 RX ADMIN — DEXAMETHASONE SODIUM PHOSPHATE 10 MG: 10 INJECTION INTRAMUSCULAR; INTRAVENOUS at 19:42

## 2022-06-14 RX ADMIN — FENTANYL CITRATE 25 MCG: 50 INJECTION, SOLUTION INTRAMUSCULAR; INTRAVENOUS at 15:43

## 2022-06-14 RX ADMIN — DEXMEDETOMIDINE HCL 8 MCG: 100 INJECTION INTRAVENOUS at 15:04

## 2022-06-14 RX ADMIN — Medication 10 MG: at 19:14

## 2022-06-14 NOTE — OP NOTE
[facility]   [address] [facility] [address], [address]  [phone] (Fax)      Operative Report  [facility] EGD     Patient Name: Maryana Santos    :  1985  MRN: [id number]  Patient Location: [facility] OR ROOM 07  Surgery Date : 2022  Surgeons:  Surgeon(s) and Role:     * Griffin Claude, MD - Primary     * Zita Girard PA-C - Assisting    Diagnosis:    Pre-Op Diagnosis Codes: Morbid obesity ([facility] [address] ) [E66 01]  Body mass index is 64 48 kg/m²  Asthma [J45 909]    Post-Op Diagnosis Codes:     * Morbid obesity ([facility] [address] ) [E66 01]     * Body mass index is 64 48 kg/m²  * Asthma [J45 909]    Procedure  1  Intraoperative Endoscopy  2  Laparoscopic Robotically Assisted Sleeve Gastrectomy    Specimen(s):  ID Type Source Tests Collected by Time Destination   1 : PORTION OF STOMACH Tissue Stomach TISSUE EXAM Griffin Claude, MD 2022 1500        Estimated Blood Loss:    20 mL    Anesthesia Type:     General    Operative Indications: Morbid obesity ([facility] [address] ) [E66 01]  Asthma [J45 909]  Body mass index is 64 48 kg/m²  Operative Findings:    Normal anatomy    Complications:     None    Procedure and Technique:    [facility] [facility] is a 40 y o  female with a Body mass index is 64 48 kg/m²  and a long standing history of morbid obesity and inability to lose a significant amount of weight on its own  This patient was found to be a good candidate to undergo a bariatric procedure upon being enrolled here at the [address] [place] [address]  OPERATIVE TECHNIQUE    The patient was taken to the operating room and placed in a supine position  A dose of IV antibiotic prophylaxis that consisted of Ancef 3g was given  Also 40 mg of subcutaneous Enoxaparin to prevent deep vein thrombosis were administered  Sequential compression devices were placed on both lower extremities      After satisfactory general anesthesia induction and endotracheal intubation was achieved, the extremities were placed and properly secured to prevent neuromuscular damage as best as possible  Subsequently, the abdominal wall was prepped and draped in a surgical standard sterile fashion  After a timeout was done and the patient was properly identified and the type of procedure was confirmed  access to the peritoneal cavity was gained with standard Veress needle technique and an optical trocar  Given her body habitus I had to insert the Veress needle twice and the second time was at [facility]  With this device, we were able to visualize the layers of the abdominal wall, and enter the peritoneal cavity under direct visualization  Pneumoperitoneum was then established with CO2 insufflation  We carefully inspected the underlying structures making sure there was no injury to them with the Veress needle  No injuries were found  A four quadrant transversus abdominis plane block was performed under direct laparoscopic vision  After this was completed four additional trocars were placed: A 12 mm in the right flank position in the midclavicular line, an 8-mm port was placed in the right flank at the anterior axillary line, an 8 mm port was placed in left flank in the midclavicular line and another 8-mm port was placed in the left flank anterior axillary line  The liver was fairly large but this patient lost over 65 lb preoperatively so the size decreased significantly ultimately allowing us to perform her surgery safely  The [facility] liver retractor was placed in the subxiphoid position through the use of a 5-mm trocar incision  The patient was repositioned to a reverse Trendelenburg position and the robot was docked    With the trocars in place, the dissection was begun  We divided the gastrocolic ligament with the energy device to enter the lesser sac   We continued to divide this ligament along the greater curvature of the stomach towards the angle of His  Special care was taken while dividing the short gastric vessels close to the spleen  This process was completely hemostatic  We then turned to the creation of an elongated and thin gastric pouch  A 36 Nepali calibration tube was placed by the anesthesia staff into the stomach under our laparoscopic surveillance  Once the tip of the bougie was confirmed to be next to the pylorus, serial firings of the laparoscopic stapler with 60-mm cartridges were utilized  We started in a point inferior to the incisura angularis and the Crows foot nerve looking to preserve the gastric emptying  This was 5 to 6 centimeters proximal to the pylorus  We created a pouch based on the lesser curve, and in vertical orientation  We continued the vertical serial firings of the stapler to the angle of His gently cinching the bougie with our laparoscopic stapler looking to create a thin pouch  As we approached the fundus of the stomach and the angle of His, the stapler loads were changed appropriately according to the variable thickness of the tissue and were reinforced with buttressing material     This completely  the pouch from the remnant stomach  We then turned our attention to the newly created pouch and examined it for bleeding or obvious defects on the staple lines and none were found  The distal stomach pouch was occluded and an EGD as well as an air insufflation test was performed  Neither intraoperative bleeding nor leaks were detected  I then covered the most proximal aspect of the staple line with a tongue of omentum in a Shon patch fashion and secured it in place with a single 2/0 Vicryl stitch  The robot was undocked and the 12 mm right flank trocar site was dilated and the gastric remnant was externalized through it and passed off the surgical field to be sent to pathology  The sponge, needle and instrument count was reported complete      The previously dilated trocar site was then closed with use of a suture closure device and a figure-of-eight with absorbable suture  The pneumoperitoneum was evacuated using the [facility] filter and smoke evacuator  The liver retractor and the remainder ports were then removed under direct laparoscopic visualization and no back bleeding was noted  The skin incisions were all closed with 4-0 absorbable subcuticular suture  The patient tolerated the procedure well, was extubated uneventfully and was transferred to the recovery room in stable condition  I was present for the entire length of the procedure as the attending of record  No qualified resident was available to assist   The presence of an assistant was necessary for camera holding, traction and counter traction and for help with suturing and stapling in addition to performing the intraop-EGD      Patient Disposition:    PACU     Signature: Analia Thomas MD  Date: June 14, 2022  Time: 3:08 PM

## 2022-06-14 NOTE — ANESTHESIA PREPROCEDURE EVALUATION
Procedure:  LAPAROSCOPIC SLEEVE GASTRECTOMY WITH ROBOTICS& INTRAOP EGD (N/A Abdomen)    Relevant Problems   GI/HEPATIC   (+) Gastroesophageal reflux disease without esophagitis      NEURO/PSYCH   (+) Depression   (+) Moderate episode of recurrent major depressive disorder (HCC)   (+) Personal history of COVID-19   (+) Tension type headache      PULMONARY   (+) Severe persistent asthma without complication      Other   (+) Morbid (severe) obesity due to excess calories (HCC)        Physical Exam    Airway    Mallampati score: II  TM Distance: >3 FB  Neck ROM: full     Dental       Cardiovascular  Rhythm: regular, Rate: normal, Cardiovascular exam normal    Pulmonary  Pulmonary exam normal Breath sounds clear to auscultation,     Other Findings        Anesthesia Plan  ASA Score- 3     Anesthesia Type- general with ASA Monitors  Additional Monitors:   Airway Plan: ETT  Plan Factors-Exercise tolerance (METS): >4 METS  Chart reviewed  Existing labs reviewed  Patient is not a current smoker  Obstructive sleep apnea risk education given perioperatively  Induction- intravenous  Postoperative Plan-     Informed Consent- Anesthetic plan and risks discussed with patient

## 2022-06-14 NOTE — INTERVAL H&P NOTE
H&P reviewed  After examining the patient I find no changes in the patients condition since the H&P had been written      Vitals:    06/14/22 1122   BP: 127/60   Pulse: 72   Resp: 16   Temp: 98 2 °F (36 8 °C)   SpO2: 96%

## 2022-06-14 NOTE — ANESTHESIA POSTPROCEDURE EVALUATION
Patient Education     Understanding Shoulder Impingement Syndrome    Shoulder impingement syndrome is a problem with the shoulder joint. It occurs when certain parts within the joint swell and are pinched. This can cause nagging pain and problems with moving the arm.  What causes shoulder impingement syndrome?  It is possible to develop impingement after years of normal shoulder use. But in most cases the condition occurs because of repeated overhead movements. These include such things as stocking shelves, painting, swimming, and throwing. These movements can irritate parts of the shoulder, leading to swelling. Swollen parts of the shoulder take up more room, making the joint space smaller. Some parts that may be involved include:  · A sac of fluid (bursa) that cushions the shoulder joint.  When the bursa is irritated, it may lead to a condition called bursitis. This is when the bursa swells with fluid, filling and squeezing the joint space.  · Fibrous tissues (tendons) that connect muscle to bone. When tendons are irritated, they may become swollen. This is called tendonitis.  · The end (acromion) of the shoulder blade. This bone may be flat or hooked. If the acromion is hooked, the joint space may be smaller than normal. Growths (bone spurs) on the acromion can also narrow the joint space. Acromion problems don’t often cause impingement. But they can make it worse.  Symptoms of shoulder impingement syndrome  Symptoms include pain, pinching, or stiffness in your shoulder. Pain often comes with movement, particularly reaching overhead or backward. It may also be felt when the shoulder is at rest. Pain at night during sleep is common.  Treatment for shoulder impingement syndrome  Treatment will depend on the cause of the problem, how bad the problem is, and if other parts of the shoulder are damaged. Treatment may include the following:  · Active rest. This allows the shoulder to heal. It means using the arm and  Post-Op Assessment Note    CV Status:  Stable    Pain management: adequate     Mental Status:  Arousable   Hydration Status:  Stable   PONV Controlled:  Controlled (severe refractory PONV with 5 agents used)   Airway Patency:  Patent      Post Op Vitals Reviewed: Yes      Staff: Anesthesiologist         No complications documented      BP     Temp     Pulse     Resp      SpO2      BP (!) 179/77   Pulse 84   Temp (!) 96 7 °F (35 9 °C) (Temporal)   Resp 20   Ht 5' 4" (1 626 m)   Wt (!) 170 kg (375 lb 10 6 oz)   LMP 05/17/2022   SpO2 98%   Breastfeeding No   BMI 64 48 kg/m² shoulder, but avoiding activities that cause pain. These likely include reaching overhead or sleeping on your shoulder.  · Cold packs. These help reduce swelling and relieve pain.  · Prescription or over-the-counter pain medicines. These help relieve pain and swelling.  · Arm and shoulder exercises. These help keep your shoulder joint mobile as it heals. They also help improve muscle strength around the joint.  · Shots of medicine into the joint. This can help reduce swelling and pain for a short time.  If other measures don’t work to relieve symptoms, you may need surgery. This opens up space in the joint to allow pain-free motion.  Possible complications of shoulder impingement syndrome  It might be tempting to stop using your shoulder completely to avoid pain. But doing so may lead to a condition called frozen shoulder. To help prevent this, follow instructions you are given for active rest and for doing exercises to help your shoulder heal.  When to call your healthcare provider  Call your healthcare provider right away if you have any of these:  · Fever of 100.4°F (38°C) or higher, or as directed  · Symptoms that don’t get better, or get worse  · New symptoms   Date Last Reviewed: 3/10/2016  © 5545-4401 [employer]. [address] [place] [address], [place] [address]. All rights reserved. This information is not intended as a substitute for professional medical care. Always follow your healthcare professional's instructions.           [facility]    Monday - Friday 8:00 a.m. - 8:00 p.m.  Saturday - Sunday 8:00 a.m. - 4:00 p.m.    -*-*-*-*-*-*-*-  The [facility] at [facility] is now CLOSED on SUNDAYS  -*-*-*-*-*-*-*-    [email or web address]  See current wait times for [facility] in real-time  Reserve your waiting-room spot in line     [email or web address]  [facility] for the patient portal  See test results and make virtual visits with your primary care  provider    ----------------  Thank you for choosing [facility] today.  We hope you had a pleasant experience and we look forward to serving your future needs.  If you receive a survey in the mail about today's services, we hope that you will take a few minutes to let us know about your experience.      · If you have any questions about your VISIT, please call [phone]    · If you have any questions about your BILL, please call [phone]    · If you need a copy of your MEDICAL RECORD, please call [phone] or email [email or web address]    --------------------------------------------------------------------------------------------------------------  UNLESS OTHERWISE INSTRUCTED BY YOUR URGENT CARE PROVIDER TODAY, all follow-up for your medical issues should be managed by your primary care provider. The Urgent Care does not manage chronic medical issues or refill medications. You are responsible for scheduling and keeping any necessary follow-up visits with your primary care provider after this visit today.   --------------------------------------------------------------------------------------------------------------  IF YOU WERE PRESCRIBED AN ANTIBIOTIC TODAY: We recommend taking an over-the-counter probiotic (Such as Florajen 3 for adults, or [id number] for children -- AVAILABLE IN THE [facility] and other local pharmacies too) once a day for the entire duration of your antibiotics, and continuing it for 2 weeks after the antibiotics are finished. This will help reduce your chance of developing antibiotic-related diarrhea and/or yeast infections.  --------------------------------------------------------------------------------------------------------------

## 2022-06-15 PROBLEM — R03.0 ELEVATED BLOOD PRESSURE READING: Status: ACTIVE | Noted: 2022-06-15

## 2022-06-15 LAB
ANION GAP SERPL CALCULATED.3IONS-SCNC: 7 MMOL/L (ref 4–13)
BUN SERPL-MCNC: 9 MG/DL (ref 5–25)
CALCIUM SERPL-MCNC: 8.5 MG/DL (ref 8.3–10.1)
CHLORIDE SERPL-SCNC: 103 MMOL/L (ref 100–108)
CO2 SERPL-SCNC: 27 MMOL/L (ref 21–32)
CREAT SERPL-MCNC: 0.67 MG/DL (ref 0.6–1.3)
ERYTHROCYTE [DISTWIDTH] IN BLOOD BY AUTOMATED COUNT: 11.4 % (ref 11.6–15.1)
GFR SERPL CREATININE-BSD FRML MDRD: 112 ML/MIN/1.73SQ M
GLUCOSE SERPL-MCNC: 150 MG/DL (ref 65–140)
HCT VFR BLD AUTO: 37.1 % (ref 34.8–46.1)
HGB BLD-MCNC: 12.1 G/DL (ref 11.5–15.4)
MCH RBC QN AUTO: 32.3 PG (ref 26.8–34.3)
MCHC RBC AUTO-ENTMCNC: 32.6 G/DL (ref 31.4–37.4)
MCV RBC AUTO: 99 FL (ref 82–98)
PLATELET # BLD AUTO: 230 THOUSANDS/UL (ref 149–390)
PMV BLD AUTO: 11 FL (ref 8.9–12.7)
POTASSIUM SERPL-SCNC: 4.1 MMOL/L (ref 3.5–5.3)
RBC # BLD AUTO: 3.75 MILLION/UL (ref 3.81–5.12)
SODIUM SERPL-SCNC: 137 MMOL/L (ref 136–145)
WBC # BLD AUTO: 8.27 THOUSAND/UL (ref 4.31–10.16)

## 2022-06-15 PROCEDURE — 80048 BASIC METABOLIC PNL TOTAL CA: CPT | Performed by: PHYSICIAN ASSISTANT

## 2022-06-15 PROCEDURE — 99024 POSTOP FOLLOW-UP VISIT: CPT | Performed by: STUDENT IN AN ORGANIZED HEALTH CARE EDUCATION/TRAINING PROGRAM

## 2022-06-15 PROCEDURE — 99221 1ST HOSP IP/OBS SF/LOW 40: CPT | Performed by: INTERNAL MEDICINE

## 2022-06-15 PROCEDURE — 85027 COMPLETE CBC AUTOMATED: CPT | Performed by: PHYSICIAN ASSISTANT

## 2022-06-15 RX ORDER — FLUTICASONE FUROATE AND VILANTEROL 200; 25 UG/1; UG/1
1 POWDER RESPIRATORY (INHALATION) DAILY
Status: DISCONTINUED | OUTPATIENT
Start: 2022-06-15 | End: 2022-06-19 | Stop reason: HOSPADM

## 2022-06-15 RX ORDER — MONTELUKAST SODIUM 10 MG/1
10 TABLET ORAL
Status: DISCONTINUED | OUTPATIENT
Start: 2022-06-15 | End: 2022-06-19 | Stop reason: HOSPADM

## 2022-06-15 RX ORDER — ALBUTEROL SULFATE 2.5 MG/3ML
2.5 SOLUTION RESPIRATORY (INHALATION) EVERY 6 HOURS PRN
Status: DISCONTINUED | OUTPATIENT
Start: 2022-06-15 | End: 2022-06-19 | Stop reason: HOSPADM

## 2022-06-15 RX ADMIN — ACETAMINOPHEN 975 MG: 325 TABLET, FILM COATED ORAL at 11:04

## 2022-06-15 RX ADMIN — ONDANSETRON 4 MG: 2 INJECTION INTRAMUSCULAR; INTRAVENOUS at 02:50

## 2022-06-15 RX ADMIN — FAMOTIDINE 20 MG: 10 INJECTION INTRAVENOUS at 23:08

## 2022-06-15 RX ADMIN — DEXAMETHASONE SODIUM PHOSPHATE 10 MG: 10 INJECTION INTRAMUSCULAR; INTRAVENOUS at 14:18

## 2022-06-15 RX ADMIN — METOCLOPRAMIDE 10 MG: 5 INJECTION, SOLUTION INTRAMUSCULAR; INTRAVENOUS at 05:57

## 2022-06-15 RX ADMIN — PROMETHAZINE HYDROCHLORIDE 25 MG: 25 INJECTION INTRAMUSCULAR; INTRAVENOUS at 07:44

## 2022-06-15 RX ADMIN — ENOXAPARIN SODIUM 40 MG: 40 INJECTION SUBCUTANEOUS at 10:33

## 2022-06-15 RX ADMIN — SIMETHICONE 80 MG: 80 TABLET, CHEWABLE ORAL at 20:31

## 2022-06-15 RX ADMIN — FLUTICASONE FUROATE AND VILANTEROL TRIFENATATE 1 PUFF: 200; 25 POWDER RESPIRATORY (INHALATION) at 12:23

## 2022-06-15 RX ADMIN — MONTELUKAST 10 MG: 10 TABLET, FILM COATED ORAL at 23:06

## 2022-06-15 RX ADMIN — SODIUM CHLORIDE, POTASSIUM CHLORIDE, SODIUM LACTATE AND CALCIUM CHLORIDE 100 ML/HR: 600; 310; 30; 20 INJECTION, SOLUTION INTRAVENOUS at 02:53

## 2022-06-15 RX ADMIN — FAMOTIDINE 20 MG: 10 INJECTION INTRAVENOUS at 08:54

## 2022-06-15 RX ADMIN — ACETAMINOPHEN 975 MG: 325 TABLET, FILM COATED ORAL at 20:31

## 2022-06-15 RX ADMIN — MORPHINE SULFATE 4 MG: 4 INJECTION INTRAVENOUS at 07:44

## 2022-06-15 RX ADMIN — SODIUM CHLORIDE, POTASSIUM CHLORIDE, SODIUM LACTATE AND CALCIUM CHLORIDE 100 ML/HR: 600; 310; 30; 20 INJECTION, SOLUTION INTRAVENOUS at 12:21

## 2022-06-15 RX ADMIN — OXYCODONE HYDROCHLORIDE 10 MG: 5 SOLUTION ORAL at 17:44

## 2022-06-15 RX ADMIN — DEXAMETHASONE SODIUM PHOSPHATE 10 MG: 10 INJECTION INTRAMUSCULAR; INTRAVENOUS at 23:08

## 2022-06-15 RX ADMIN — DEXAMETHASONE SODIUM PHOSPHATE 10 MG: 10 INJECTION INTRAMUSCULAR; INTRAVENOUS at 05:57

## 2022-06-15 RX ADMIN — SODIUM CHLORIDE, POTASSIUM CHLORIDE, SODIUM LACTATE AND CALCIUM CHLORIDE 100 ML/HR: 600; 310; 30; 20 INJECTION, SOLUTION INTRAVENOUS at 23:08

## 2022-06-15 NOTE — PLAN OF CARE
Problem: DISCHARGE PLANNING - CARE MANAGEMENT  Goal: Discharge to post-acute care or home with appropriate resources  Description: INTERVENTIONS:  - Conduct assessment to determine patient/family and health care team treatment goals, and need for post-acute services based on payer coverage, community resources, and patient preferences, and barriers to discharge  - Address psychosocial, clinical, and financial barriers to discharge as identified in assessment in conjunction with the patient/family and health care team  - Arrange appropriate level of post-acute services according to patients   needs and preference and payer coverage in collaboration with the physician and health care team  - Communicate with and update the patient/family, physician, and health care team regarding progress on the discharge plan  - Arrange appropriate transportation to post-acute venues  Outcome: Progressing     Problem: RESPIRATORY - ADULT  Goal: Achieves optimal ventilation and oxygenation  Description: INTERVENTIONS:  - Assess for changes in respiratory status  - Assess for changes in mentation and behavior  - Position to facilitate oxygenation and minimize respiratory effort  - Oxygen administered by appropriate delivery if ordered  - Initiate smoking cessation education as indicated  - Encourage broncho-pulmonary hygiene including cough, deep breathe, Incentive Spirometry  - Assess the need for suctioning and aspirate as needed  - Assess and instruct to report SOB or any respiratory difficulty  - Respiratory Therapy support as indicated  Outcome: Progressing     Problem: GASTROINTESTINAL - ADULT  Goal: Minimal or absence of nausea and/or vomiting  Description: INTERVENTIONS:  - Administer IV fluids if ordered to ensure adequate hydration  - Maintain NPO status until nausea and vomiting are resolved  - Nasogastric tube if ordered  - Administer ordered antiemetic medications as needed  - Provide nonpharmacologic comfort measures as appropriate  - Advance diet as tolerated, if ordered  - Consider nutrition services referral to assist patient with adequate nutrition and appropriate food choices  Outcome: Progressing  Goal: Maintains adequate nutritional intake  Description: INTERVENTIONS:  - Monitor percentage of each meal consumed  - Identify factors contributing to decreased intake, treat as appropriate  - Assist with meals as needed  - Monitor I&O, weight, and lab values if indicated  - Obtain nutrition services referral as needed  Outcome: Progressing     Problem: SKIN/TISSUE INTEGRITY - ADULT  Goal: Incision(s), wounds(s) or drain site(s) healing without S/S of infection  Description: INTERVENTIONS  - Assess and document dressing, incision, wound bed, drain sites and surrounding tissue  - Provide patient and family education     Problem: PAIN - ADULT  Goal: Verbalizes/displays adequate comfort level or baseline comfort level  Description: Interventions:  - Encourage patient to monitor pain and request assistance  - Assess pain using appropriate pain scale  - Administer analgesics based on type and severity of pain and evaluate response  - Implement non-pharmacological measures as appropriate and evaluate response  - Consider cultural and social influences on pain and pain management  - Notify physician/advanced practitioner if interventions unsuccessful or patient reports new pain  Outcome: Progressing     Problem: INFECTION - ADULT  Goal: Absence or prevention of progression during hospitalization  Description: INTERVENTIONS:  - Assess and monitor for signs and symptoms of infection  - Monitor lab/diagnostic results  - Monitor all insertion sites, i e  indwelling lines, tubes, and drains  - Monitor endotracheal if appropriate and nasal secretions for changes in amount and color  - Bryant appropriate cooling/warming therapies per order  - Administer medications as ordered  - Instruct and encourage patient and family to use good hand hygiene technique  - Identify and instruct in appropriate isolation precautions for identified infection/condition  Outcome: Progressing     Problem: DISCHARGE PLANNING  Goal: Discharge to home or other facility with appropriate resources  Description: INTERVENTIONS:  - Identify barriers to discharge w/patient and caregiver  - Arrange for needed discharge resources and transportation as appropriate  - Identify discharge learning needs (meds, wound care, etc )  - Arrange for interpretive services to assist at discharge as needed  - Refer to Case Management Department for coordinating discharge planning if the patient needs post-hospital services based on physician/advanced practitioner order or complex needs related to functional status, cognitive ability, or social support system  Outcome: Progressing     Problem: Knowledge Deficit  Goal: Patient/family/caregiver demonstrates understanding of disease process, treatment plan, medications, and discharge instructions  Description: Complete learning assessment and assess knowledge base    Interventions:  - Provide teaching at level of understanding  - Provide teaching via preferred learning methods  Outcome: Progressing

## 2022-06-15 NOTE — CONSULTS
[address] [facility] 1985, 40 y o  female MRN: [id number]  Unit/Bed#: E5 -01 Encounter: [id number]  Primary Care Provider: Tera Montero DO   Date and time admitted to hospital: 6/14/2022 10:46 AM    Inpatient consult to Internal Medicine  Consult performed by: Anthony Sheppard PA-C  Consult ordered by: Kimo Ortega MD          * Morbid (severe) obesity due to excess calories [facility])  Assessment & Plan   status post laparoscopic sleeve gastrectomy on 06/14/2022  Management per bariatrics including pain control, management of nausea/vomiting, DVT prophylaxis and discharge planning    Severe persistent asthma without complication  Assessment & Plan  Without acute exacerbation  Continue home inhaler regimen including Breo, ipratropium  Continue Singulair  Encourage deep breathing and incentive spirometer use  Outpatient pulmonology follow-up    Elevated blood pressure reading  Assessment & Plan  Likely contributed to pain with nausea and vomiting  Continue to control contributing factors per primary team  She is not maintained on any antihypertensive regimen prior to admission        VTE Prophylaxis:   Moderate Risk (Score 3-4) - Pharmacological DVT Prophylaxis Ordered: enoxaparin (Lovenox)  Recommendations for Discharge:  · As above    Counseling / Coordination of Care Time: 30 minutes Greater than 50% of total time spent on patient counseling and coordination of care  Collaboration of Care: Were Recommendations Directly Discussed with Primary Treatment Team? No    History of Present Illness:  Lizbeth Canela is a 40 y o  female who is originally admitted to the bariatric service due to status post sleeve gastrectomy  We are consulted for medical management postoperatively  Patient has a history of severe persistent asthma for which she follows with pulmonology     She is maintained on Breo, ipratropium and Singulair    She is not on any antihypertensive regimen or documented history of diabetes  She was seen by pulmonology and Cardiology prior to surgery  Patient states she is not feeling well  States he had a rough time in the PACU recovering with nausea vomiting with some hematemesis  She continues to have nausea into this morning  She is not tolerating small sips of clear liquids at this time  She denies any chest pain or shortness of breath  No cough  Review of Systems:  Review of Systems   Constitutional: Negative for chills and fever  Respiratory: Negative for cough and shortness of breath  Cardiovascular: Negative for chest pain  Gastrointestinal: Positive for abdominal pain, nausea and vomiting  Neurological: Negative for dizziness and light-headedness  All other systems reviewed and are negative  Past Medical and Surgical History:   Past Medical History:   Diagnosis Date    Allergic rhinitis     Chronic pain     COVID-19 03/26/2021    Depression     pt states is resolved at this time    Insomnia     Mild intermittent asthma with exacerbation 03/23/2021    Obesity, Class III, BMI 40-49 9 (morbid obesity) ([facility] [address] ) 07/02/2012    Scoliosis     Seasonal allergies        Past Surgical History:   Procedure Laterality Date    ABCESS DRAINAGE      Incision and dranage of skin abscess    CHOLECYSTECTOMY      AK LAP, NESS RESTRICT PROC, LONGITUDINAL GASTRECTOMY N/A 6/14/2022    Procedure: LAPAROSCOPIC SLEEVE GASTRECTOMY WITH ROBOTICS& INTRAOP EGD;  Surgeon: Irene Montoya MD;  Location: [facility];  Service: Bariatrics    UPPER GASTROINTESTINAL ENDOSCOPY         Meds/Allergies:  all medications and allergies reviewed    Allergies:    Allergies   Allergen Reactions    Ambrosia Artemisiifolia (Ragweed) Skin Test     Dust Mite Extract     Pollen Extract        Social History:  Marital Status: Single  Substance Use History:   Social History     Substance and Sexual Activity   Alcohol Use Never     Social History Tobacco Use   Smoking Status Former Smoker   Smokeless Tobacco Never Used   Tobacco Comment    quit 10 yrs ago     Social History     Substance and Sexual Activity   Drug Use No       Family History:  Family History   Problem Relation Age of Onset    Other Father         Cardiac disorder    Hypertension Father     Diabetes Maternal Grandmother     Diabetes Mother     Hypertension Mother     Cervical cancer Mother        Physical Exam:   Vitals:   Blood Pressure: 135/73 (06/15/22 0717)  Pulse: (!) 50 (06/15/22 0737)  Temperature: 99 3 °F (37 4 °C) (06/15/22 0737)  Temp Source: Temporal (06/14/22 1531)  Respirations: 18 (06/15/22 0717)  Height: 5' 4" (162 6 cm) (06/15/22 0300)  Weight - Scale: (!) 170 kg (375 lb 10 6 oz) (06/15/22 0300)  SpO2: 97 % (06/15/22 0737)    Physical Exam  Vitals and nursing note reviewed  Constitutional:       General: She is not in acute distress  Appearance: She is obese  She is ill-appearing  HENT:      Head: Normocephalic  Cardiovascular:      Rate and Rhythm: Normal rate and regular rhythm  Pulmonary:      Effort: Pulmonary effort is normal  No respiratory distress  Breath sounds: Normal breath sounds  No wheezing  Comments: Decreased breath sounds throughout, no adventitious sounds present  Abdominal:      Palpations: Abdomen is soft  Tenderness: There is abdominal tenderness  Musculoskeletal:      Right lower leg: No edema  Left lower leg: No edema  Skin:     General: Skin is warm  Neurological:      Mental Status: She is alert  Mental status is at baseline     Psychiatric:         Mood and Affect: Mood normal           Additional Data:   Lab Results:    Results from last 7 days   Lab Units 06/15/22  0509   WBC Thousand/uL 8 27   HEMOGLOBIN g/dL 12 1   HEMATOCRIT % 37 1   PLATELETS Thousands/uL 230     Results from last 7 days   Lab Units 06/15/22  0509   SODIUM mmol/L 137   POTASSIUM mmol/L 4 1   CHLORIDE mmol/L 103   CO2 mmol/L 27   BUN mg/dL 9   CREATININE mg/dL 0 67   ANION GAP mmol/L 7   CALCIUM mg/dL 8 5   GLUCOSE RANDOM mg/dL 150*             Lab Results   Component Value Date/Time    HGBA1C 4 1 (L) 11/19/2018 01:16 PM    HGBA1C 5 0 05/09/2017 02:15 PM    HGBA1C 4 7 01/12/2016 12:01 PM                 No orders to display       EKG, Pathology, and Other Studies Reviewed on Admission:   · EKG: No EKG obtained  ** Please Note: This note may have been constructed using a voice recognition system   **

## 2022-06-15 NOTE — PROGRESS NOTES
Progress Note - Bariatric Surgery   Dinora Alonso 40 y o  female MRN: [id number]  Unit/Bed#: E5 -01 Encounter: [id number]      Subjective/Objective     Subjective:  Patient with morbid obesity POD1 s/p robotic sleeve gastrectomy  Patient denies fevers, chills, sweats, SOB, CP, calf pain  Pain adequately controlled on oral pain medication  Ambulating without assistance, voiding well, and using incentive spirometer  Patient had significant nausea and vomiting overnight  Started on decadron which seems to be helping  She had uncontrolled HTN overnight for which RACHEL was consulted  Vital signs stable  CBC today shows stable H/H  BMP obtained today is within normal limits  Objective:    /73   Pulse 62   Temp 99 5 °F (37 5 °C)   Resp 18   Ht 5' 4" (1 626 m)   Wt (!) 170 kg (375 lb 10 6 oz)   LMP 05/17/2022   SpO2 94%   Breastfeeding No   BMI 64 48 kg/m²       Intake/Output Summary (Last 24 hours) at 6/15/2022 0724  Last data filed at 6/15/2022 0253  Gross per 24 hour   Intake 3050 ml   Output 270 ml   Net 2780 ml       Invasive Devices  Report    Peripheral Intravenous Line  Duration           Peripheral IV 06/14/22 Dorsal (posterior); Left Hand <1 day                ROS: 10-point system completed  All negative except see HPI  Physical Exam    General Appearance:    Alert, cooperative, no distress, appears stated age   Head:    Normocephalic, without obvious abnormality, atraumatic   Lungs:     Respirations unlabored   Heart:    Regular rate and rhythm   Abdomen:     Soft, appropriate tenderness, no masses, no organomegaly, non-distended   Extremities:   Extremities normal, atraumatic, no cyanosis or edema   Neurologic:  Incision:  Psych:   Normal strength and sensation    Clean, dry, and intact, no bleeding    Normal mood and affect       Lab, Imaging and other studies:  I have personally reviewed pertinent lab results    , CBC:   Lab Results   Component Value Date    WBC 8 27 06/15/2022    HGB 12 1 06/15/2022    HCT 37 1 06/15/2022    MCV 99 (H) 06/15/2022     06/15/2022    MCH 32 3 06/15/2022    MCHC 32 6 06/15/2022    RDW 11 4 (L) 06/15/2022    MPV 11 0 06/15/2022        VTE Mechanical Prophylaxis: sequential compression device    Assessment/Plan  1)  Patient with morbid Obesity s/p robotic Sleeve Gastrectomy with stable post op course  Patient afebrile and hemodynamically stable  - Encourage PO fluids  - Recommend ambulation, use of SCDs when not ambulating, and incentive spirometry    - Ok to give Lovenox   - No need to repeat labs  - Plan to D/C patient home today pending anticipated progression    Plan of care was discussed with patient    Care plan discussed with Dr Eleuterio Cruz MD  Bariatric Surgery  6/15/2022  7:24 AM

## 2022-06-15 NOTE — ASSESSMENT & PLAN NOTE
Without acute exacerbation  Continue home inhaler regimen including Breo, ipratropium  Continue Singulair  Encourage deep breathing and incentive spirometer use  Outpatient pulmonology follow-up

## 2022-06-15 NOTE — PLAN OF CARE
Problem: DISCHARGE PLANNING - CARE MANAGEMENT  Goal: Discharge to post-acute care or home with appropriate resources  Description: INTERVENTIONS:  - Conduct assessment to determine patient/family and health care team treatment goals, and need for post-acute services based on payer coverage, community resources, and patient preferences, and barriers to discharge  - Address psychosocial, clinical, and financial barriers to discharge as identified in assessment in conjunction with the patient/family and health care team  - Arrange appropriate level of post-acute services according to patients   needs and preference and payer coverage in collaboration with the physician and health care team  - Communicate with and update the patient/family, physician, and health care team regarding progress on the discharge plan  - Arrange appropriate transportation to post-acute venues  6/15/2022 0143 by Palmer Galvan RN  Outcome: Progressing  6/15/2022 0143 by Palmer Galvan RN  Outcome: Progressing     Problem: RESPIRATORY - ADULT  Goal: Achieves optimal ventilation and oxygenation  Description: INTERVENTIONS:  - Assess for changes in respiratory status  - Assess for changes in mentation and behavior  - Position to facilitate oxygenation and minimize respiratory effort  - Oxygen administered by appropriate delivery if ordered  - Initiate smoking cessation education as indicated  - Encourage broncho-pulmonary hygiene including cough, deep breathe, Incentive Spirometry  - Assess the need for suctioning and aspirate as needed  - Assess and instruct to report SOB or any respiratory difficulty  - Respiratory Therapy support as indicated  6/15/2022 0143 by Palmer Galvan RN  Outcome: Progressing  6/15/2022 0143 by Palmer Galvan RN  Outcome: Progressing     Problem: GASTROINTESTINAL - ADULT  Goal: Minimal or absence of nausea and/or vomiting  Description: INTERVENTIONS:  - Administer IV fluids if ordered to ensure adequate hydration  - Maintain NPO status until nausea and vomiting are resolved  - Nasogastric tube if ordered  - Administer ordered antiemetic medications as needed  - Provide nonpharmacologic comfort measures as appropriate  - Advance diet as tolerated, if ordered  - Consider nutrition services referral to assist patient with adequate nutrition and appropriate food choices  6/15/2022 0143 by Cici Carrillo RN  Outcome: Progressing  6/15/2022 0143 by Cici Carrillo RN  Outcome: Progressing  Goal: Maintains adequate nutritional intake  Description: INTERVENTIONS:  - Monitor percentage of each meal consumed  - Identify factors contributing to decreased intake, treat as appropriate  - Assist with meals as needed  - Monitor I&O, weight, and lab values if indicated  - Obtain nutrition services referral as needed  6/15/2022 0143 by Cici Carrillo RN  Outcome: Progressing  6/15/2022 0143 by Cici Carrillo RN  Outcome: Progressing     Problem: SKIN/TISSUE INTEGRITY - ADULT  Goal: Incision(s), wounds(s) or drain site(s) healing without S/S of infection  Description: INTERVENTIONS  - Assess and document dressing, incision, wound bed, drain sites and surrounding tissue  - Provide patient and family education  - Perform skin care/dressing changes every   6/15/2022 0143 by Cici Carrillo RN  Outcome: Progressing  6/15/2022 0143 by Cici Carrillo RN  Outcome: Progressing     Problem: PAIN - ADULT  Goal: Verbalizes/displays adequate comfort level or baseline comfort level  Description: Interventions:  - Encourage patient to monitor pain and request assistance  - Assess pain using appropriate pain scale  - Administer analgesics based on type and severity of pain and evaluate response  - Implement non-pharmacological measures as appropriate and evaluate response  - Consider cultural and social influences on pain and pain management  - Notify physician/advanced practitioner if interventions unsuccessful or patient reports new pain  Outcome: Progressing     Problem: INFECTION - ADULT  Goal: Absence or prevention of progression during hospitalization  Description: INTERVENTIONS:  - Assess and monitor for signs and symptoms of infection  - Monitor lab/diagnostic results  - Monitor all insertion sites, i e  indwelling lines, tubes, and drains  - Monitor endotracheal if appropriate and nasal secretions for changes in amount and color  - [place] appropriate cooling/warming therapies per order  - Administer medications as ordered  - Instruct and encourage patient and family to use good hand hygiene technique  - Identify and instruct in appropriate isolation precautions for identified infection/condition  Outcome: Progressing  Goal: Absence of fever/infection during neutropenic period  Description: INTERVENTIONS:  - Monitor WBC    Outcome: Progressing     Problem: SAFETY ADULT  Goal: Patient will remain free of falls  Description: INTERVENTIONS:  - Educate patient/family on patient safety including physical limitations  - Instruct patient to call for assistance with activity   - Consult OT/PT to assist with strengthening/mobility   - Keep Call bell within reach  - Keep bed low and locked with side rails adjusted as appropriate  - Keep care items and personal belongings within reach  - Initiate and maintain comfort rounds  - Make Fall Risk Sign visible to staff  - Offer Toileting every  Hours, in advance of need  - Initiate/Maintain alarm  - Obtain necessary fall risk management equipment:   - Apply yellow socks and bracelet for high fall risk patients  - Consider moving patient to room near nurses station  Outcome: Progressing  Goal: Maintain or return to baseline ADL function  Description: INTERVENTIONS:  -  Assess patient's ability to carry out ADLs; assess patient's baseline for ADL function and identify physical deficits which impact ability to perform ADLs (bathing, care of mouth/teeth, toileting, grooming, dressing, etc )  - Assess/evaluate cause of self-care deficits   - Assess range of motion  - Assess patient's mobility; develop plan if impaired  - Assess patient's need for assistive devices and provide as appropriate  - Encourage maximum independence but intervene and supervise when necessary  - Involve family in performance of ADLs  - Assess for home care needs following discharge   - Consider OT consult to assist with ADL evaluation and planning for discharge  - Provide patient education as appropriate  Outcome: Progressing  Goal: Maintains/Returns to pre admission functional level  Description: INTERVENTIONS:  - Perform BMAT or MOVE assessment daily    - Set and communicate daily mobility goal to care team and patient/family/caregiver  - Collaborate with rehabilitation services on mobility goals if consulted  - Perform Range of Motion  times a day  - Reposition patient every  hours    - Dangle patient  times a day  - Stand patient  times a day  - Ambulate patient  times a day  - Out of bed to chair  times a day   - Out of bed for meal times a day  - Out of bed for toileting  - Record patient progress and toleration of activity level   Outcome: Progressing     Problem: DISCHARGE PLANNING  Goal: Discharge to home or other facility with appropriate resources  Description: INTERVENTIONS:  - Identify barriers to discharge w/patient and caregiver  - Arrange for needed discharge resources and transportation as appropriate  - Identify discharge learning needs (meds, wound care, etc )  - Arrange for interpretive services to assist at discharge as needed  - Refer to Case Management Department for coordinating discharge planning if the patient needs post-hospital services based on physician/advanced practitioner order or complex needs related to functional status, cognitive ability, or social support system  Outcome: Progressing     Problem: Knowledge Deficit  Goal: Patient/family/caregiver demonstrates understanding of disease process, treatment plan, medications, and discharge instructions  Description: Complete learning assessment and assess knowledge base    Interventions:  - Provide teaching at level of understanding  - Provide teaching via preferred learning methods  Outcome: Progressing

## 2022-06-15 NOTE — PHYSICIAN ADVISOR
Current patient class: Outpatient Surgery  The patient is currently on Hospital Day: 2 at [address] [place] [address]      This patient was originally admitted to the hospital under outpatient surgical class  After admission the patient was reevaluated and determined to require further hospitalization  After review of the relevant documentation, labs, vital signs and test results, the patient is appropriate for INPATIENT ADMISSION  Admission to the hospital as an inpatient is a complex decision making process which requires the practitioner to consider the patients presenting complaint, history and physical examination and all relevant testing  With this in mind, in this case, the patient was deemed appropriate for INPATIENT ADMISSION  After review of the documentation and testing available at the time of the admission I concur with this clinical determination of medical necessity  Rationale is as follows: The patient underwent sleeve gastrectomy yesterday  Overnight she had significant nausea and vomiting  Today she is receiving intravenous fluids, intravenous Decadron every 8 hours, intravenous Zofran, and Phenergan  She is not yet medically ready for discharge  As an underlying condition the patient has severe persistent asthma  She had significantly elevated blood pressure which may have in part been due to nausea and vomiting  Internal Medicine has been consulted  The patient is expected to remain in the hospital beyond the typical outpatient surgery timeframe and would be appropriate for change to IP class      The patients vitals on arrival were   ED Triage Vitals [06/14/22 1122]   Temperature Pulse Respirations Blood Pressure SpO2   98 2 °F (36 8 °C) 72 16 127/60 96 %      Temp Source Heart Rate Source Patient Position - Orthostatic VS BP Location FiO2 (%)   Temporal Monitor -- -- --      Pain Score       No Pain           Past Medical History:   Diagnosis Date    Allergic rhinitis     Chronic pain     COVID-19 03/26/2021    Depression     pt states is resolved at this time    Insomnia     Mild intermittent asthma with exacerbation 03/23/2021    Obesity, Class III, BMI 40-49 9 (morbid obesity) ([facility] [address] ) 07/02/2012    Scoliosis     Seasonal allergies      Past Surgical History:   Procedure Laterality Date    ABCESS DRAINAGE      Incision and dranage of skin abscess    CHOLECYSTECTOMY      MO LAP, NESS RESTRICT PROC, LONGITUDINAL GASTRECTOMY N/A 6/14/2022    Procedure: LAPAROSCOPIC SLEEVE GASTRECTOMY WITH ROBOTICS& INTRAOP EGD;  Surgeon: Marisol Brady MD;  Location: [facility];  Service: Bariatrics    UPPER GASTROINTESTINAL ENDOSCOPY         The patient was admitted to the hospital at N/A on N/A for the following diagnosis:  Morbid obesity ([facility] [address] ) [E66 01]  Asthma [J45 909]    Consults have been placed to:   IP CONSULT TO INTERNAL MEDICINE    Vitals:    06/15/22 0300 06/15/22 0717 06/15/22 0737 06/15/22 1105   BP:  135/73  139/78   Pulse:  62 (!) 50 (!) 50   Resp:  18     Temp:  99 5 °F (37 5 °C) 99 3 °F (37 4 °C) 98 7 °F (37 1 °C)   TempSrc:       SpO2:  94% 97% 97%   Weight: (!) 170 kg (375 lb 10 6 oz)      Height: 5' 4" (1 626 m)          Most recent labs:    Recent Labs     06/15/22  0509   WBC 8 27   HGB 12 1   HCT 37 1      K 4 1   CALCIUM 8 5   BUN 9   CREATININE 0 67       Scheduled Meds:  Current Facility-Administered Medications   Medication Dose Route Frequency Provider Last Rate    acetaminophen  975 mg Oral Q8H Zita HARRIET Girard PA-C      Or    acetaminophen  975 mg Oral Q8H Zita HARRIET Girard PA-C      albuterol  2 5 mg Nebulization Q6H PRN Melania Ochoa PA-C      dexamethasone  10 mg Intravenous [facility] Dee Ahn MD      diphenhydrAMINE  25 mg Oral Q8H PRN Zita Girard PA-C      enoxaparin  40 mg Subcutaneous Q24H Zita HARRIET Girard PA-C      famotidine  20 mg Intravenous BID Zita HARRIET Girard PA-C      fluticasone-vilanterol  1 puff Inhalation Daily Ingrid Sherman PA-C      ipratropium  0 5 mg Nebulization 4x Daily PRN Ingrid Sherman PA-C      lactated ringers  100 mL/hr Intravenous Continuous Zita HARRIET Girard PA-C 100 mL/hr (06/15/22 1221)    metoclopramide  10 mg Intravenous Q6H PRN Zita N JANELLE Girard      montelukast  10 mg Oral HS Edith Christensen PA-C      morphine injection  4 mg Intravenous Q4H PRN Zita N Ikepedesiree, JANELLE      ondansetron  4 mg Intravenous Q6H PRN Zita N Ikepedesiree, JANELLE      oxyCODONE  10 mg Oral Q4H PRN Zita N Rupedesiree, JANELLE      oxyCODONE  5 mg Oral Q4H PRN Zita N Ikepedesiree, JANELLE      phenol  2 spray Mouth/Throat Q2H PRN Zita N Shaji, JANELLE      promethazine  25 mg Intravenous Q6H PRN Zita N JANELLE Girard      scopolamine  1 patch Transdermal Once Gee Sevilla MD      simethicone  80 mg Oral 4x Daily PRN Zita HARRIET Girard PA-C       Continuous Infusions:lactated ringers, 100 mL/hr, Last Rate: 100 mL/hr (06/15/22 1221)      PRN Meds:   albuterol    diphenhydrAMINE    ipratropium    metoclopramide    morphine injection    ondansetron    oxyCODONE    oxyCODONE    phenol    promethazine    simethicone    Surgical procedures (if appropriate):  Procedure(s):  LAPAROSCOPIC SLEEVE GASTRECTOMY WITH ROBOTICS& INTRAOP EGD

## 2022-06-15 NOTE — UTILIZATION REVIEW
Initial Clinical Review    APPROVED AS OP PROCEDURE  6/14  CHANGED TO IP ADMISSION  6/15  SIGNIFICANT NAUSEA/VOMITING       STARTED IV DECADRON     UNCONTROLLED  HYPERTENSION    Elective  IP    surgical procedure    Age/Sex: 40 y o  female     Surgery Date:    6/14/22    1  Procedure: Intraoperative Endoscopy  2  Laparoscopic Robotically Assisted Sleeve Gastrectomy      Anesthesia:    general    Operative Findings:     Normal anatomy    POD#1 Progress Note:   6/15   IP ADMISSION  Continue post op care  Continue pain control/antiemetics as needed  Started on  IV  Decadron, significant  Nausea and vomiting overnight  Had  Uncontrolled hypertension during the night    Need to monitor BP        6/16    D/C  home    Admission Orders: Date/Time/Statement:   Admission Orders (From admission, onward)     Ordered        06/15/22 1434  Inpatient Admission  Once                      Orders Placed This Encounter   Procedures    Inpatient Admission     Standing Status:   Standing     Number of Occurrences:   1     Order Specific Question:   Level of Care     Answer:   Med Surg [16]     Order Specific Question:   Estimated length of stay     Answer:   Inpatient Only Surgery     Vital Signs: /78   Pulse (!) 50   Temp 98 7 °F (37 1 °C)   Resp 18   Ht 5' 4" (1 626 m)   Wt (!) 170 kg (375 lb 10 6 oz)   LMP 05/17/2022   SpO2 97%   Breastfeeding No   BMI 64 48 kg/m²      -- 47 Abnormal  19 140/65 90 97 % -- -- -- --    06/15/22 11:05:22 98 7 °F (37 1 °C) 50 Abnormal  -- 139/78 98 97 % -- -- -- --   06/15/22 07:37:45 99 3 °F (37 4 °C) 50 Abnormal  -- -- -- 97 % -- -- -- --   06/15/22 07:17:41 99 5 °F (37 5 °C) 62 18 135/73 94 94 % -- -- -- --   06/14/22 22:35:47 98 2 °F (36 8 °C) 70 18 149/92 111 97 % -- -- -- --   06/14/22 20:17:37 98 7 °F (37 1 °C) 65 18 136/75 95 95 % -- -- -- --   06/14/22 1944 97 2 °F (36 2 °C) Abnormal  66 20 142/71 -- 99 % -- -- None (Room air) --   06/14/22 1918 -- 68 20 165/76 109 97 % -- -- None (Room air) --   06/14/22 1849 -- 77 20 168/77 -- 98 % -- -- None (Room air) --   06/14/22 1821 -- 76 20 175/88 Abnormal  121 97 % -- -- None (Room air) --   06/14/22 1758 -- 84 20 179/77 Abnormal  114 98 % -- -- None (Room air) --   06/14/22 1751 -- 88 22 183/89 Abnormal  -- 98 % 28 2 L/min Nasal cannula --   06/14/22 1730 -- 78 21 174/81 Abnormal  117 97 % -- -- -- --         Pertinent Labs/Diagnostic Test Results:     Results from last 7 days   Lab Units 06/15/22  0509   WBC Thousand/uL 8 27   HEMOGLOBIN g/dL 12 1   HEMATOCRIT % 37 1   PLATELETS Thousands/uL 230         Results from last 7 days   Lab Units 06/15/22  0509   SODIUM mmol/L 137   POTASSIUM mmol/L 4 1   CHLORIDE mmol/L 103   CO2 mmol/L 27   ANION GAP mmol/L 7   BUN mg/dL 9   CREATININE mg/dL 0 67   EGFR ml/min/1 73sq m 112   CALCIUM mg/dL 8 5             Results from last 7 days   Lab Units 06/15/22  0509   GLUCOSE RANDOM mg/dL 150*           Diet:    Bariatric cl liq    Mobility:    OOB   As  tolerated    DVT Prophylaxis:    SCD'S    Medications/Pain Control:   Scheduled Medications:  acetaminophen, 975 mg, Oral, Q8H   Or  acetaminophen, 975 mg, Oral, Q8H  dexamethasone, 10 mg, Intravenous, Q8H SUSIE  enoxaparin, 40 mg, Subcutaneous, Q24H  famotidine, 20 mg, Intravenous, BID  fluticasone-vilanterol, 1 puff, Inhalation, Daily  montelukast, 10 mg, Oral, HS  scopolamine, 1 patch, Transdermal, Once      Continuous IV Infusions:  lactated ringers, 100 mL/hr, Intravenous, Continuous      PRN Meds:  albuterol, 2 5 mg, Nebulization, Q6H PRN  diphenhydrAMINE, 25 mg, Oral, Q8H PRN  ipratropium, 0 5 mg, Nebulization, 4x Daily PRN  metoclopramide, 10 mg, Intravenous, Q6H PRN   (  X1   6/14 and X 1  6/15 thus far)  morphine injection, 4 mg, Intravenous, Q4H PRN  ( x1  6/15  Thus far)  ondansetron, 4 mg, Intravenous, Q6H PRN  ( x1  6/15 thus far)  oxyCODONE, 10 mg, Oral, Q4H PRN  oxyCODONE, 5 mg, Oral, Q4H PRN  phenol, 2 spray, Mouth/Throat, Q2H PRN  promethazine, 25 mg, Intravenous, Q6H PRN  ( X 1  6/14 and X 1  6/15 thus far)  simethicone, 80 mg, Oral, 4x Daily PRN        Network Utilization Review Department  ATTENTION: Please call with any questions or concerns to [phone] and carefully listen to the prompts so that you are directed to the right person  All voicemails are confidential   Leslie Ingram all requests for admission clinical reviews, approved or denied determinations and any other requests to dedicated fax number below belonging to the campus where the patient is receiving treatment   List of dedicated fax numbers for the Facilities:  [address] DENIALS (Administrative/Medical Necessity) [phone]   [address] (Maternity/NICU/Pediatrics) [phone]   [address] [place] [address] [place] [address] [address] [place] [address] Michael Lofton [address] [place] [address] [phone]

## 2022-06-16 PROCEDURE — 99024 POSTOP FOLLOW-UP VISIT: CPT | Performed by: STUDENT IN AN ORGANIZED HEALTH CARE EDUCATION/TRAINING PROGRAM

## 2022-06-16 PROCEDURE — NC001 PR NO CHARGE: Performed by: PHYSICIAN ASSISTANT

## 2022-06-16 RX ORDER — ENOXAPARIN SODIUM 100 MG/ML
40 INJECTION SUBCUTANEOUS DAILY
Qty: 4.8 ML | Refills: 0 | Status: SHIPPED | OUTPATIENT
Start: 2022-06-16 | End: 2022-06-19 | Stop reason: SDUPTHER

## 2022-06-16 RX ORDER — FAMOTIDINE 20 MG/1
20 TABLET, FILM COATED ORAL 2 TIMES DAILY
Status: DISCONTINUED | OUTPATIENT
Start: 2022-06-16 | End: 2022-06-19 | Stop reason: HOSPADM

## 2022-06-16 RX ORDER — FEXOFENADINE HCL AND PSEUDOEPHEDRINE HCI 180; 240 MG/1; MG/1
1 TABLET, EXTENDED RELEASE ORAL DAILY
Qty: 30 TABLET | Refills: 0 | Status: SHIPPED | OUTPATIENT
Start: 2022-06-16 | End: 2022-09-14

## 2022-06-16 RX ORDER — ACETAMINOPHEN 160 MG/5ML
975 SUSPENSION, ORAL (FINAL DOSE FORM) ORAL EVERY 8 HOURS
Qty: 638.4 ML | Refills: 0 | Status: SHIPPED | OUTPATIENT
Start: 2022-06-16 | End: 2022-06-23

## 2022-06-16 RX ADMIN — FAMOTIDINE 20 MG: 20 TABLET ORAL at 17:16

## 2022-06-16 RX ADMIN — ONDANSETRON 4 MG: 2 INJECTION INTRAMUSCULAR; INTRAVENOUS at 21:03

## 2022-06-16 RX ADMIN — MONTELUKAST 10 MG: 10 TABLET, FILM COATED ORAL at 21:03

## 2022-06-16 RX ADMIN — ENOXAPARIN SODIUM 40 MG: 40 INJECTION SUBCUTANEOUS at 09:40

## 2022-06-16 RX ADMIN — ACETAMINOPHEN 975 MG: 325 TABLET, FILM COATED ORAL at 12:01

## 2022-06-16 RX ADMIN — DEXAMETHASONE SODIUM PHOSPHATE 10 MG: 10 INJECTION INTRAMUSCULAR; INTRAVENOUS at 21:03

## 2022-06-16 RX ADMIN — ACETAMINOPHEN 975 MG: 325 TABLET, FILM COATED ORAL at 04:39

## 2022-06-16 RX ADMIN — FAMOTIDINE 20 MG: 20 TABLET ORAL at 09:39

## 2022-06-16 RX ADMIN — OXYCODONE HYDROCHLORIDE 5 MG: 5 SOLUTION ORAL at 15:46

## 2022-06-16 RX ADMIN — DEXAMETHASONE SODIUM PHOSPHATE 10 MG: 10 INJECTION INTRAMUSCULAR; INTRAVENOUS at 14:24

## 2022-06-16 RX ADMIN — FLUTICASONE FUROATE AND VILANTEROL TRIFENATATE 1 PUFF: 200; 25 POWDER RESPIRATORY (INHALATION) at 09:40

## 2022-06-16 RX ADMIN — SODIUM CHLORIDE, POTASSIUM CHLORIDE, SODIUM LACTATE AND CALCIUM CHLORIDE 100 ML/HR: 600; 310; 30; 20 INJECTION, SOLUTION INTRAVENOUS at 15:41

## 2022-06-16 RX ADMIN — SIMETHICONE 80 MG: 80 TABLET, CHEWABLE ORAL at 11:30

## 2022-06-16 RX ADMIN — DEXAMETHASONE SODIUM PHOSPHATE 10 MG: 10 INJECTION INTRAMUSCULAR; INTRAVENOUS at 04:39

## 2022-06-16 RX ADMIN — SIMETHICONE 80 MG: 80 TABLET, CHEWABLE ORAL at 04:39

## 2022-06-16 RX ADMIN — ACETAMINOPHEN 975 MG: 325 TABLET, FILM COATED ORAL at 21:03

## 2022-06-16 NOTE — PROGRESS NOTES
Progress Note - Bariatric Surgery   Bulmaro Shi 40 y o  female MRN: [id number]  Unit/Bed#: E5 -01 Encounter: [id number]      Subjective/Objective     Subjective:  Patient with morbid obesity POD2 s/p robotic sleeve gastrectomy  Patient denies fevers, chills, sweats, SOB, CP, calf pain  Pain adequately controlled on oral pain medication  Ambulating without assistance, voiding well, and using incentive spirometer  Nausea has significantly improved and she is now tolerating a liquid diet without issue  Vital signs stable  Objective:    /67   Pulse (!) 40   Temp 98 3 °F (36 8 °C)   Resp 18   Ht 5' 4" (1 626 m)   Wt (!) 170 kg (375 lb 10 6 oz)   LMP 05/17/2022   SpO2 95%   Breastfeeding No   BMI 64 48 kg/m²       Intake/Output Summary (Last 24 hours) at 6/16/2022 0835  Last data filed at 6/16/2022 0649  Gross per 24 hour   Intake 1450 ml   Output --   Net 1450 ml       Invasive Devices  Report    None                 ROS: 10-point system completed  All negative except see HPI  Physical Exam    General Appearance:    Alert, cooperative, no distress, appears stated age   Head:    Normocephalic, without obvious abnormality, atraumatic   Lungs:     Respirations unlabored   Heart:    Regular rate and rhythm   Abdomen:     Soft, appropriate tenderness, no masses, no organomegaly, non-distended   Extremities:   Extremities normal, atraumatic, no cyanosis or edema   Neurologic:  Incision:  Psych:   Normal strength and sensation    Clean, dry, and intact, no bleeding    Normal mood and affect       Lab, Imaging and other studies:  I have personally reviewed pertinent lab results  VTE Mechanical Prophylaxis: sequential compression device    Assessment/Plan  1)  Patient with morbid Obesity s/p robotic Sleeve Gastrectomy with stable post op course  Patient afebrile and hemodynamically stable       - Encourage PO fluids  - Recommend ambulation, use of SCDs when not ambulating, and incentive spirometry  - Plan to D/C patient home today pending anticipated progression    Plan of care was discussed with patient    Care plan discussed with Dr Peggy Skelton MD  Bariatric Surgery  6/16/2022  8:35 AM

## 2022-06-16 NOTE — PLAN OF CARE
Problem: DISCHARGE PLANNING - CARE MANAGEMENT  Goal: Discharge to post-acute care or home with appropriate resources  Description: INTERVENTIONS:  - Conduct assessment to determine patient/family and health care team treatment goals, and need for post-acute services based on payer coverage, community resources, and patient preferences, and barriers to discharge  - Address psychosocial, clinical, and financial barriers to discharge as identified in assessment in conjunction with the patient/family and health care team  - Arrange appropriate level of post-acute services according to patients   needs and preference and payer coverage in collaboration with the physician and health care team  - Communicate with and update the patient/family, physician, and health care team regarding progress on the discharge plan  - Arrange appropriate transportation to post-acute venues  Outcome: Progressing     Problem: RESPIRATORY - ADULT  Goal: Achieves optimal ventilation and oxygenation  Description: INTERVENTIONS:  - Assess for changes in respiratory status  - Assess for changes in mentation and behavior  - Position to facilitate oxygenation and minimize respiratory effort  - Oxygen administered by appropriate delivery if ordered  - Initiate smoking cessation education as indicated  - Encourage broncho-pulmonary hygiene including cough, deep breathe, Incentive Spirometry  - Assess the need for suctioning and aspirate as needed  - Assess and instruct to report SOB or any respiratory difficulty  - Respiratory Therapy support as indicated  Outcome: Progressing     Problem: GASTROINTESTINAL - ADULT  Goal: Minimal or absence of nausea and/or vomiting  Description: INTERVENTIONS:  - Administer IV fluids if ordered to ensure adequate hydration  - Maintain NPO status until nausea and vomiting are resolved  - Nasogastric tube if ordered  - Administer ordered antiemetic medications as needed  - Provide nonpharmacologic comfort measures as appropriate  - Advance diet as tolerated, if ordered  - Consider nutrition services referral to assist patient with adequate nutrition and appropriate food choices  Outcome: Progressing  Goal: Maintains adequate nutritional intake  Description: INTERVENTIONS:  - Monitor percentage of each meal consumed  - Identify factors contributing to decreased intake, treat as appropriate  - Assist with meals as needed  - Monitor I&O, weight, and lab values if indicated  - Obtain nutrition services referral as needed  Outcome: Progressing     Problem: SKIN/TISSUE INTEGRITY - ADULT  Goal: Incision(s), wounds(s) or drain site(s) healing without S/S of infection  Description: INTERVENTIONS  - Assess and document dressing, incision, wound bed, drain sites and surrounding tissue  - Provide patient and family education  - Perform skin care/dressing changes   Outcome: Progressing     Problem: PAIN - ADULT  Goal: Verbalizes/displays adequate comfort level or baseline comfort level  Description: Interventions:  - Encourage patient to monitor pain and request assistance  - Assess pain using appropriate pain scale  - Administer analgesics based on type and severity of pain and evaluate response  - Implement non-pharmacological measures as appropriate and evaluate response  - Consider cultural and social influences on pain and pain management  - Notify physician/advanced practitioner if interventions unsuccessful or patient reports new pain  Outcome: Progressing     Problem: INFECTION - ADULT  Goal: Absence or prevention of progression during hospitalization  Description: INTERVENTIONS:  - Assess and monitor for signs and symptoms of infection  - Monitor lab/diagnostic results  - Monitor all insertion sites, i e  indwelling lines, tubes, and drains  - Monitor endotracheal if appropriate and nasal secretions for changes in amount and color  - [place] appropriate cooling/warming therapies per order  - Administer medications as ordered  - Instruct and encourage patient and family to use good hand hygiene technique  - Identify and instruct in appropriate isolation precautions for identified infection/condition  Outcome: Progressing     Problem: DISCHARGE PLANNING  Goal: Discharge to home or other facility with appropriate resources  Description: INTERVENTIONS:  - Identify barriers to discharge w/patient and caregiver  - Arrange for needed discharge resources and transportation as appropriate  - Identify discharge learning needs (meds, wound care, etc )  - Arrange for interpretive services to assist at discharge as needed  - Refer to Case Management Department for coordinating discharge planning if the patient needs post-hospital services based on physician/advanced practitioner order or complex needs related to functional status, cognitive ability, or social support system  Outcome: Progressing     Problem: Knowledge Deficit  Goal: Patient/family/caregiver demonstrates understanding of disease process, treatment plan, medications, and discharge instructions  Description: Complete learning assessment and assess knowledge base    Interventions:  - Provide teaching at level of understanding  - Provide teaching via preferred learning methods  Outcome: Progressing

## 2022-06-16 NOTE — NURSING NOTE
Pt reported to RN that she had vomited in the shower  RN unable to visualize, pt states she washed it down the drain  Pt appears uncomfortable, states she is having a lot of gas pain  Simethicone and tylenol given, RN reached out to on-call physician with update  No further complaints of nausea at this time

## 2022-06-16 NOTE — DISCHARGE SUMMARY
Discharge Summary - Torito Shanks 40 y o  female MRN: [id number]    Unit/Bed#: E5 -01 Encounter: [id number]      Pre-Operative Diagnosis: Pre-Op Diagnosis Codes:     * Morbid obesity ([facility] [address] ) [E66 01]     * Asthma [J45 909]    Post-Operative Diagnosis: Post-Op Diagnosis Codes:     * Morbid obesity ([facility] [address] ) [E66 01]     * Asthma [J45 909]    Procedures Performed:  Procedure(s):  LAPAROSCOPIC SLEEVE GASTRECTOMY WITH ROBOTICS& INTRAOP EGD    Surgeon: Saundra Dawson MD    See H & P for full details of admission and Operative Note for full details of operations performed  Patient tolerated surgery well without complications  In the morning postoperative Day 1, the patient had mild nausea and abdominal pain  Tolerated a clear liquid diet without vomiting  Able to ambulate and voiding independently  Patient was deemed ready for discharge home on POD2 on Lovenox  Patient was seen and examined prior to discharge  Provisions for Follow-Up Care:  See After Visit Summary/Discharge Instructions for information related to follow-up care and home orders  Disposition: Home, in stable condition  Planned Readmission: No    Discharge Medications:  See After Visit Summary/Discharge Instructions for reconciled discharge medications provided to patient and family  Post Operative instructions: Reviewed with patient and/or family      Signature:   Dane Javier PA-C  Date: 6/16/2022 Time: 9:56 AM

## 2022-06-16 NOTE — DISCHARGE INSTRUCTIONS
Bariatric/Weight Loss Surgery  Hospital Discharge Instructions  ACTIVITY:  Progress as feels comfortable - a good rule is:  if you are doing something and it begins to hurt, stop doing the activity  Walk every hour while at home  You may walk stairs if you do so slowly  You may shower 48 hours after surgery  Do not scrub incision sites  Blot gently with clean towel to dry incisions  (see #4 below)   Use your incentive spirometer 10 times per hour while awake for 1 week after surgery  Do NOT drive for 48 hours after surgery  No driving 24 hours after taking certain prescription pain medications  Examples of such medication are Percocet, Darvocet, Oxycodone, Tylenol #3, and Tylenol with Codeine  DIET  Stay on a liquid diet for 7 days after your surgery date, sipping slowly  Refer to your manual for examples of choices  Remember to keep your liquids sugar free or low calorie  You may have protein drinks  Make sure to drink 48 to 64 ounces per day of fluids  You may advance to a pureed diet one week after surgery as instructed by your diet progression pamphlet  Once you get approval from your surgeon at your first post operative visit, you may advance to the soft diet and remain on soft diet for 8 weeks unless otherwise instructed  MEDICATIONS:  The abdominal nerve block will wear off during the first 1-2 days that you are home, and you may become sore (especially over incision site/sites where abdominal wall is sutured)  This may create a pulling sensation, especially while moving around, and will fade over time  Continue to take your Tylenol and your pain medication as instructed  Start vitamins and minerals one week after surgery or when you start stage 3/puree diet  Anti-acid Medication as per prescription  Other medications as indicated on the Physician Patient Discharge Instructions form given to you at the time of discharge    Make sure that you are splitting your pill or tablet medications in halves or fourths or even crushing them before you take them  Capsules should be opened and mixed with water or jello  You need to do this for at least 4 weeks after surgery  Eventually you will be able to take your medications the regular way as they were prescribed  You will need to consult with your Family Doctor in regards to all your prescribed medication, particularly those for blood pressure and diabetes  As you lose weight, medical conditions may change, requiring an alteration or elimination of the drug dose  Monitor blood pressure closely and call PCP with any concerns  Sleeve Gastrectomy patients ONLY:  Complete full course of lovenox injections! DO NOT TAKE BIRTH CONTROL(BC) MEDICATIONS, INSERT BC VAGINAL RINGS, OR PLACE IUD OR ANY OTHER BC METHODS UNTIL 31 DAYS FROM DAY OF DISCHARGE FROM HOSPITAL  THIS PLACES YOU AT HIGH RISK FOR A POTENTIALLY LIFE THREATENING BLOOD CLOT  Remember to always use barrier methods for birth control and speak to your GYN about using two forms of birth control to start 31 days after surgery  It is very important to avoid pregnancy until at least 18-24 months after surgery  INCISION CARE  You may shower and get incisions wet 2 days after surgery  No soaking tub baths or swimming for 30 days after surgery  Keep abdominal area and incisions clean  Use soap and water to create a good lather and rinse off  Do not scrub incisions  If you have a drain, empty the drain as the nurses instructed  FOLLOW-UP APPOINTMENT should be made for one week after discharge  Call surgeons office at [phone] to schedule an appointment      CALL YOUR DOCTOR FOR:  pain not controlled by pain medications, a temperature greater than 101 5° F, any increase or change in drainage or redness from any incision, any vomiting or inability to keep liquids down, shortness of breath, shoulder pain, or bleeding

## 2022-06-16 NOTE — PLAN OF CARE
Problem: DISCHARGE PLANNING - CARE MANAGEMENT  Goal: Discharge to post-acute care or home with appropriate resources  Description: INTERVENTIONS:  - Conduct assessment to determine patient/family and health care team treatment goals, and need for post-acute services based on payer coverage, community resources, and patient preferences, and barriers to discharge  - Address psychosocial, clinical, and financial barriers to discharge as identified in assessment in conjunction with the patient/family and health care team  - Arrange appropriate level of post-acute services according to patients   needs and preference and payer coverage in collaboration with the physician and health care team  - Communicate with and update the patient/family, physician, and health care team regarding progress on the discharge plan  - Arrange appropriate transportation to post-acute venues  Outcome: Progressing     Problem: RESPIRATORY - ADULT  Goal: Achieves optimal ventilation and oxygenation  Description: INTERVENTIONS:  - Assess for changes in respiratory status  - Assess for changes in mentation and behavior  - Position to facilitate oxygenation and minimize respiratory effort  - Oxygen administered by appropriate delivery if ordered  - Initiate smoking cessation education as indicated  - Encourage broncho-pulmonary hygiene including cough, deep breathe, Incentive Spirometry  - Assess the need for suctioning and aspirate as needed  - Assess and instruct to report SOB or any respiratory difficulty  - Respiratory Therapy support as indicated  Outcome: Progressing     Problem: GASTROINTESTINAL - ADULT  Goal: Minimal or absence of nausea and/or vomiting  Description: INTERVENTIONS:  - Administer IV fluids if ordered to ensure adequate hydration  - Maintain NPO status until nausea and vomiting are resolved  - Nasogastric tube if ordered  - Administer ordered antiemetic medications as needed  - Provide nonpharmacologic comfort measures as appropriate  - Advance diet as tolerated, if ordered  - Consider nutrition services referral to assist patient with adequate nutrition and appropriate food choices  Outcome: Progressing  Goal: Maintains adequate nutritional intake  Description: INTERVENTIONS:  - Monitor percentage of each meal consumed  - Identify factors contributing to decreased intake, treat as appropriate  - Assist with meals as needed  - Monitor I&O, weight, and lab values if indicated  - Obtain nutrition services referral as needed  Outcome: Progressing     Problem: SKIN/TISSUE INTEGRITY - ADULT  Goal: Incision(s), wounds(s) or drain site(s) healing without S/S of infection  Description: INTERVENTIONS  - Assess and document dressing, incision, wound bed, drain sites and surrounding tissue  - Provide patient and family education  - Perform skin care/dressing changes dontae  Outcome: Progressing     Problem: PAIN - ADULT  Goal: Verbalizes/displays adequate comfort level or baseline comfort level  Description: Interventions:  - Encourage patient to monitor pain and request assistance  - Assess pain using appropriate pain scale  - Administer analgesics based on type and severity of pain and evaluate response  - Implement non-pharmacological measures as appropriate and evaluate response  - Consider cultural and social influences on pain and pain management  - Notify physician/advanced practitioner if interventions unsuccessful or patient reports new pain  Outcome: Progressing     Problem: INFECTION - ADULT  Goal: Absence or prevention of progression during hospitalization  Description: INTERVENTIONS:  - Assess and monitor for signs and symptoms of infection  - Monitor lab/diagnostic results  - Monitor all insertion sites, i e  indwelling lines, tubes, and drains  - Monitor endotracheal if appropriate and nasal secretions for changes in amount and color  - [place] appropriate cooling/warming therapies per order  - Administer medications as ordered  - Instruct and encourage patient and family to use good hand hygiene technique  - Identify and instruct in appropriate isolation precautions for identified infection/condition  Outcome: Progressing     Problem: DISCHARGE PLANNING  Goal: Discharge to home or other facility with appropriate resources  Description: INTERVENTIONS:  - Identify barriers to discharge w/patient and caregiver  - Arrange for needed discharge resources and transportation as appropriate  - Identify discharge learning needs (meds, wound care, etc )  - Arrange for interpretive services to assist at discharge as needed  - Refer to Case Management Department for coordinating discharge planning if the patient needs post-hospital services based on physician/advanced practitioner order or complex needs related to functional status, cognitive ability, or social support system  Outcome: Progressing     Problem: Knowledge Deficit  Goal: Patient/family/caregiver demonstrates understanding of disease process, treatment plan, medications, and discharge instructions  Description: Complete learning assessment and assess knowledge base    Interventions:  - Provide teaching at level of understanding  - Provide teaching via preferred learning methods  Outcome: Progressing

## 2022-06-16 NOTE — DISCHARGE INSTR - AVS FIRST PAGE
your medications from [address] in [facility] [address] or cut your pills and open capsules, mix with liquid to drink  Take Tylenol every 8 hours around the clock, unless instructed otherwise  Take your omeprazole daily  It is important to stay hydrated and follow your discharge diet progression   Mild nausea is ok as long as you can drink fluids, sip very slowly and get up and walk during any periods of nausea  You may shower normally after 48 hours, but do not scrub incision sites, blot gently with clean towel to dry incisions  Take home medications as usual unless instructed otherwise while in hospital  Follow up with Dr Pierre Patricio and your PCP within the next week  Sleeve gastrectomy patients ONLY: Complete full course of lovenox injections!

## 2022-06-17 PROCEDURE — 99024 POSTOP FOLLOW-UP VISIT: CPT | Performed by: STUDENT IN AN ORGANIZED HEALTH CARE EDUCATION/TRAINING PROGRAM

## 2022-06-17 RX ADMIN — DEXAMETHASONE SODIUM PHOSPHATE 10 MG: 10 INJECTION INTRAMUSCULAR; INTRAVENOUS at 05:02

## 2022-06-17 RX ADMIN — ACETAMINOPHEN 975 MG: 325 TABLET, FILM COATED ORAL at 21:34

## 2022-06-17 RX ADMIN — ACETAMINOPHEN 975 MG: 325 SUSPENSION ORAL at 12:09

## 2022-06-17 RX ADMIN — MONTELUKAST 10 MG: 10 TABLET, FILM COATED ORAL at 21:34

## 2022-06-17 RX ADMIN — DEXAMETHASONE SODIUM PHOSPHATE 10 MG: 10 INJECTION INTRAMUSCULAR; INTRAVENOUS at 21:34

## 2022-06-17 RX ADMIN — OXYCODONE HYDROCHLORIDE 5 MG: 5 SOLUTION ORAL at 04:57

## 2022-06-17 RX ADMIN — FAMOTIDINE 20 MG: 20 TABLET ORAL at 17:39

## 2022-06-17 RX ADMIN — FAMOTIDINE 20 MG: 20 TABLET ORAL at 09:33

## 2022-06-17 RX ADMIN — FLUTICASONE FUROATE AND VILANTEROL TRIFENATATE 1 PUFF: 200; 25 POWDER RESPIRATORY (INHALATION) at 09:33

## 2022-06-17 RX ADMIN — SODIUM CHLORIDE, POTASSIUM CHLORIDE, SODIUM LACTATE AND CALCIUM CHLORIDE 100 ML/HR: 600; 310; 30; 20 INJECTION, SOLUTION INTRAVENOUS at 01:45

## 2022-06-17 RX ADMIN — ENOXAPARIN SODIUM 40 MG: 40 INJECTION SUBCUTANEOUS at 09:32

## 2022-06-17 RX ADMIN — SIMETHICONE 80 MG: 80 TABLET, CHEWABLE ORAL at 09:33

## 2022-06-17 RX ADMIN — DEXAMETHASONE SODIUM PHOSPHATE 10 MG: 10 INJECTION INTRAMUSCULAR; INTRAVENOUS at 13:29

## 2022-06-17 RX ADMIN — SODIUM CHLORIDE, POTASSIUM CHLORIDE, SODIUM LACTATE AND CALCIUM CHLORIDE 100 ML/HR: 600; 310; 30; 20 INJECTION, SOLUTION INTRAVENOUS at 10:44

## 2022-06-17 NOTE — PLAN OF CARE
Problem: DISCHARGE PLANNING - CARE MANAGEMENT  Goal: Discharge to post-acute care or home with appropriate resources  Description: INTERVENTIONS:  - Conduct assessment to determine patient/family and health care team treatment goals, and need for post-acute services based on payer coverage, community resources, and patient preferences, and barriers to discharge  - Address psychosocial, clinical, and financial barriers to discharge as identified in assessment in conjunction with the patient/family and health care team  - Arrange appropriate level of post-acute services according to patients   needs and preference and payer coverage in collaboration with the physician and health care team  - Communicate with and update the patient/family, physician, and health care team regarding progress on the discharge plan  - Arrange appropriate transportation to post-acute venues  Outcome: Progressing     Problem: RESPIRATORY - ADULT  Goal: Achieves optimal ventilation and oxygenation  Description: INTERVENTIONS:  - Assess for changes in respiratory status  - Assess for changes in mentation and behavior  - Position to facilitate oxygenation and minimize respiratory effort  - Oxygen administered by appropriate delivery if ordered  - Initiate smoking cessation education as indicated  - Encourage broncho-pulmonary hygiene including cough, deep breathe, Incentive Spirometry  - Assess the need for suctioning and aspirate as needed  - Assess and instruct to report SOB or any respiratory difficulty  - Respiratory Therapy support as indicated  Outcome: Progressing     Problem: GASTROINTESTINAL - ADULT  Goal: Minimal or absence of nausea and/or vomiting  Description: INTERVENTIONS:  - Administer IV fluids if ordered to ensure adequate hydration  - Maintain NPO status until nausea and vomiting are resolved  - Nasogastric tube if ordered  - Administer ordered antiemetic medications as needed  - Provide nonpharmacologic comfort measures as appropriate  - Advance diet as tolerated, if ordered  - Consider nutrition services referral to assist patient with adequate nutrition and appropriate food choices  Outcome: Progressing  Goal: Maintains adequate nutritional intake  Description: INTERVENTIONS:  - Monitor percentage of each meal consumed  - Identify factors contributing to decreased intake, treat as appropriate  - Assist with meals as needed  - Monitor I&O, weight, and lab values if indicated  - Obtain nutrition services referral as needed  Outcome: Progressing     Problem: SKIN/TISSUE INTEGRITY - ADULT  Goal: Incision(s), wounds(s) or drain site(s) healing without S/S of infection  Description: INTERVENTIONS  - Assess and document dressing, incision, wound bed, drain sites and surrounding tissue  - Provide patient and family education  - Perform skin care/dressing changes dontae  Outcome: Progressing     Problem: PAIN - ADULT  Goal: Verbalizes/displays adequate comfort level or baseline comfort level  Description: Interventions:  - Encourage patient to monitor pain and request assistance  - Assess pain using appropriate pain scale  - Administer analgesics based on type and severity of pain and evaluate response  - Implement non-pharmacological measures as appropriate and evaluate response  - Consider cultural and social influences on pain and pain management  - Notify physician/advanced practitioner if interventions unsuccessful or patient reports new pain  Outcome: Progressing     Problem: INFECTION - ADULT  Goal: Absence or prevention of progression during hospitalization  Description: INTERVENTIONS:  - Assess and monitor for signs and symptoms of infection  - Monitor lab/diagnostic results  - Monitor all insertion sites, i e  indwelling lines, tubes, and drains  - Monitor endotracheal if appropriate and nasal secretions for changes in amount and color  - [place] appropriate cooling/warming therapies per order  - Administer medications as ordered  - Instruct and encourage patient and family to use good hand hygiene technique  - Identify and instruct in appropriate isolation precautions for identified infection/condition  Outcome: Progressing     Problem: DISCHARGE PLANNING  Goal: Discharge to home or other facility with appropriate resources  Description: INTERVENTIONS:  - Identify barriers to discharge w/patient and caregiver  - Arrange for needed discharge resources and transportation as appropriate  - Identify discharge learning needs (meds, wound care, etc )  - Arrange for interpretive services to assist at discharge as needed  - Refer to Case Management Department for coordinating discharge planning if the patient needs post-hospital services based on physician/advanced practitioner order or complex needs related to functional status, cognitive ability, or social support system  Outcome: Progressing     Problem: Knowledge Deficit  Goal: Patient/family/caregiver demonstrates understanding of disease process, treatment plan, medications, and discharge instructions  Description: Complete learning assessment and assess knowledge base    Interventions:  - Provide teaching at level of understanding  - Provide teaching via preferred learning methods  Outcome: Progressing

## 2022-06-17 NOTE — PLAN OF CARE
Problem: DISCHARGE PLANNING - CARE MANAGEMENT  Goal: Discharge to post-acute care or home with appropriate resources  Description: INTERVENTIONS:  - Conduct assessment to determine patient/family and health care team treatment goals, and need for post-acute services based on payer coverage, community resources, and patient preferences, and barriers to discharge  - Address psychosocial, clinical, and financial barriers to discharge as identified in assessment in conjunction with the patient/family and health care team  - Arrange appropriate level of post-acute services according to patients   needs and preference and payer coverage in collaboration with the physician and health care team  - Communicate with and update the patient/family, physician, and health care team regarding progress on the discharge plan  - Arrange appropriate transportation to post-acute venues  Outcome: Progressing     Problem: RESPIRATORY - ADULT  Goal: Achieves optimal ventilation and oxygenation  Description: INTERVENTIONS:  - Assess for changes in respiratory status  - Assess for changes in mentation and behavior  - Position to facilitate oxygenation and minimize respiratory effort  - Oxygen administered by appropriate delivery if ordered  - Initiate smoking cessation education as indicated  - Encourage broncho-pulmonary hygiene including cough, deep breathe, Incentive Spirometry  - Assess the need for suctioning and aspirate as needed  - Assess and instruct to report SOB or any respiratory difficulty  - Respiratory Therapy support as indicated  Outcome: Progressing     Problem: GASTROINTESTINAL - ADULT  Goal: Minimal or absence of nausea and/or vomiting  Description: INTERVENTIONS:  - Administer IV fluids if ordered to ensure adequate hydration  - Maintain NPO status until nausea and vomiting are resolved  - Nasogastric tube if ordered  - Administer ordered antiemetic medications as needed  - Provide nonpharmacologic comfort measures as appropriate  - Advance diet as tolerated, if ordered  - Consider nutrition services referral to assist patient with adequate nutrition and appropriate food choices  Outcome: Progressing  Goal: Maintains adequate nutritional intake  Description: INTERVENTIONS:  - Monitor percentage of each meal consumed  - Identify factors contributing to decreased intake, treat as appropriate  - Assist with meals as needed  - Monitor I&O, weight, and lab values if indicated  - Obtain nutrition services referral as needed  Outcome: Progressing     Problem: SKIN/TISSUE INTEGRITY - ADULT  Goal: Incision(s), wounds(s) or drain site(s) healing without S/S of infection  Description: INTERVENTIONS  - Assess and document dressing, incision, wound bed, drain sites and surrounding tissue  - Provide patient and family education  - Perform skin care/dressing changes  Outcome: Progressing     Problem: PAIN - ADULT  Goal: Verbalizes/displays adequate comfort level or baseline comfort level  Description: Interventions:  - Encourage patient to monitor pain and request assistance  - Assess pain using appropriate pain scale  - Administer analgesics based on type and severity of pain and evaluate response  - Implement non-pharmacological measures as appropriate and evaluate response  - Consider cultural and social influences on pain and pain management  - Notify physician/advanced practitioner if interventions unsuccessful or patient reports new pain  Outcome: Progressing     Problem: INFECTION - ADULT  Goal: Absence or prevention of progression during hospitalization  Description: INTERVENTIONS:  - Assess and monitor for signs and symptoms of infection  - Monitor lab/diagnostic results  - Monitor all insertion sites, i e  indwelling lines, tubes, and drains  - Monitor endotracheal if appropriate and nasal secretions for changes in amount and color  - [place] appropriate cooling/warming therapies per order  - Administer medications as ordered  - Instruct and encourage patient and family to use good hand hygiene technique  - Identify and instruct in appropriate isolation precautions for identified infection/condition  Outcome: Progressing     Problem: DISCHARGE PLANNING  Goal: Discharge to home or other facility with appropriate resources  Description: INTERVENTIONS:  - Identify barriers to discharge w/patient and caregiver  - Arrange for needed discharge resources and transportation as appropriate  - Identify discharge learning needs (meds, wound care, etc )  - Arrange for interpretive services to assist at discharge as needed  - Refer to Case Management Department for coordinating discharge planning if the patient needs post-hospital services based on physician/advanced practitioner order or complex needs related to functional status, cognitive ability, or social support system  Outcome: Progressing     Problem: Knowledge Deficit  Goal: Patient/family/caregiver demonstrates understanding of disease process, treatment plan, medications, and discharge instructions  Description: Complete learning assessment and assess knowledge base    Interventions:  - Provide teaching at level of understanding  - Provide teaching via preferred learning methods  Outcome: Progressing

## 2022-06-17 NOTE — PROGRESS NOTES
Progress Note - Bariatric Surgery   Torito Shanks 40 y o  female MRN: [id number]  Unit/Bed#: E5 -01 Encounter: [id number]      Subjective/Objective     Subjective:  Patient with morbid obesity POD3 s/p robotic sleeve gastrectomy  Patient denies fevers, chills, sweats, SOB, CP, calf pain  Pain adequately controlled on oral pain medication  Ambulating without assistance, voiding well, and using incentive spirometer  Patient reports epigastric pain and continued nausea  Denies emesis  Vital signs stable  Objective:    /77 (BP Location: Right arm)   Pulse (!) 44   Temp 97 5 °F (36 4 °C) (Oral)   Resp 13   Ht 5' 4" (1 626 m)   Wt (!) 170 kg (375 lb 10 6 oz)   SpO2 97%   Breastfeeding No   BMI 64 48 kg/m²     No intake or output data in the 24 hours ending 06/17/22 1304    Invasive Devices  Report    Peripheral Intravenous Line  Duration           Peripheral IV 06/16/22 Left;Proximal;Ventral (anterior) Forearm 1 day                ROS: 10-point system completed  All negative except see HPI  Physical Exam    General Appearance:    Alert, cooperative, no distress, appears stated age   Head:    Normocephalic, without obvious abnormality, atraumatic   Lungs:     Respirations unlabored   Heart:    Regular rate and rhythm   Abdomen:     Soft, appropriate tenderness, no masses, no organomegaly, non-distended   Extremities:   Extremities normal, atraumatic, no cyanosis or edema   Neurologic:  Incision:  Psych:   Normal strength and sensation    Clean, dry, and intact, no bleeding    Normal mood and affect       Lab, Imaging and other studies:  I have personally reviewed pertinent lab results  VTE Mechanical Prophylaxis: sequential compression device    Assessment/Plan  1)  Patient with morbid Obesity s/p robotic Sleeve Gastrectomy with stable post op course  Patient afebrile and hemodynamically stable       - Encourage PO fluids  - Recommend ambulation, use of SCDs when not ambulating, and incentive spirometry  - Plan to D/C patient home tomorrow pending anticipated progression    Plan of care was discussed with patient    Care plan discussed with Dr Calixto Casiano MD  Bariatric Surgery  6/17/2022  1:04 PM

## 2022-06-18 PROCEDURE — 99024 POSTOP FOLLOW-UP VISIT: CPT | Performed by: STUDENT IN AN ORGANIZED HEALTH CARE EDUCATION/TRAINING PROGRAM

## 2022-06-18 RX ADMIN — DEXAMETHASONE SODIUM PHOSPHATE 10 MG: 10 INJECTION INTRAMUSCULAR; INTRAVENOUS at 05:28

## 2022-06-18 RX ADMIN — FAMOTIDINE 20 MG: 20 TABLET ORAL at 16:45

## 2022-06-18 RX ADMIN — OXYCODONE HYDROCHLORIDE 10 MG: 5 SOLUTION ORAL at 06:56

## 2022-06-18 RX ADMIN — DEXAMETHASONE SODIUM PHOSPHATE 10 MG: 10 INJECTION INTRAMUSCULAR; INTRAVENOUS at 14:39

## 2022-06-18 RX ADMIN — DEXAMETHASONE SODIUM PHOSPHATE 10 MG: 10 INJECTION INTRAMUSCULAR; INTRAVENOUS at 22:04

## 2022-06-18 RX ADMIN — SODIUM CHLORIDE, POTASSIUM CHLORIDE, SODIUM LACTATE AND CALCIUM CHLORIDE 100 ML/HR: 600; 310; 30; 20 INJECTION, SOLUTION INTRAVENOUS at 06:57

## 2022-06-18 RX ADMIN — OXYCODONE HYDROCHLORIDE 10 MG: 5 SOLUTION ORAL at 14:39

## 2022-06-18 RX ADMIN — SODIUM CHLORIDE, POTASSIUM CHLORIDE, SODIUM LACTATE AND CALCIUM CHLORIDE 100 ML/HR: 600; 310; 30; 20 INJECTION, SOLUTION INTRAVENOUS at 16:45

## 2022-06-18 RX ADMIN — FAMOTIDINE 20 MG: 20 TABLET ORAL at 08:23

## 2022-06-18 RX ADMIN — MONTELUKAST 10 MG: 10 TABLET, FILM COATED ORAL at 22:04

## 2022-06-18 RX ADMIN — FLUTICASONE FUROATE AND VILANTEROL TRIFENATATE 1 PUFF: 200; 25 POWDER RESPIRATORY (INHALATION) at 08:23

## 2022-06-18 RX ADMIN — ENOXAPARIN SODIUM 40 MG: 40 INJECTION SUBCUTANEOUS at 08:23

## 2022-06-18 NOTE — PROGRESS NOTES
Progress Note - Bariatric Surgery   Piper Valle 40 y o  female MRN: [id number]  Unit/Bed#: E5 -01 Encounter: [id number]      Subjective/Objective     Subjective:  Patient with morbid obesity POD4 s/p robotic sleeve gastrectomy  Patient denies fevers, chills, sweats, SOB, CP, calf pain  Pain adequately controlled on oral pain medication  Ambulating without assistance, voiding well, and using incentive spirometer  Patient reports epigastric pain and continued nausea mostly at night  Denies emesis  Vital signs stable  Objective:    /79 (BP Location: Right arm)   Pulse (!) 40   Temp (!) 97 2 °F (36 2 °C) (Tympanic)   Resp 18   Ht 5' 4" (1 626 m)   Wt (!) 170 kg (375 lb 10 6 oz)   SpO2 96%   Breastfeeding No   BMI 64 48 kg/m²     No intake or output data in the 24 hours ending 06/18/22 0720    Invasive Devices  Report    Peripheral Intravenous Line  Duration           Peripheral IV 06/16/22 Left;Proximal;Ventral (anterior) Forearm 1 day                ROS: 10-point system completed  All negative except see HPI  Physical Exam    General Appearance:    Alert, cooperative, no distress, appears stated age   Head:    Normocephalic, without obvious abnormality, atraumatic   Lungs:     Respirations unlabored   Heart:    Regular rate and rhythm   Abdomen:     Soft, appropriate tenderness, no masses, no organomegaly, non-distended   Extremities:   Extremities normal, atraumatic, no cyanosis or edema   Neurologic:  Incision:  Psych:   Normal strength and sensation    Clean, dry, and intact, no bleeding    Normal mood and affect       Lab, Imaging and other studies:  I have personally reviewed pertinent lab results  VTE Mechanical Prophylaxis: sequential compression device    Assessment/Plan  1)  Patient with morbid Obesity s/p robotic Sleeve Gastrectomy with stable post op course  Patient afebrile and hemodynamically stable       - Encourage PO fluids  - Recommend ambulation, use of SCDs when not ambulating, and incentive spirometry  - Plan to D/C patient home tomorrow pending anticipated progression    Plan of care was discussed with patient    Care plan discussed with Dr Fabrizio Dozier MD  Bariatric Surgery  6/18/2022  7:20 AM

## 2022-06-18 NOTE — PLAN OF CARE
Problem: DISCHARGE PLANNING - CARE MANAGEMENT  Goal: Discharge to post-acute care or home with appropriate resources  Description: INTERVENTIONS:  - Conduct assessment to determine patient/family and health care team treatment goals, and need for post-acute services based on payer coverage, community resources, and patient preferences, and barriers to discharge  - Address psychosocial, clinical, and financial barriers to discharge as identified in assessment in conjunction with the patient/family and health care team  - Arrange appropriate level of post-acute services according to patients   needs and preference and payer coverage in collaboration with the physician and health care team  - Communicate with and update the patient/family, physician, and health care team regarding progress on the discharge plan  - Arrange appropriate transportation to post-acute venues  Outcome: Progressing     Problem: RESPIRATORY - ADULT  Goal: Achieves optimal ventilation and oxygenation  Description: INTERVENTIONS:  - Assess for changes in respiratory status  - Assess for changes in mentation and behavior  - Position to facilitate oxygenation and minimize respiratory effort  - Oxygen administered by appropriate delivery if ordered  - Initiate smoking cessation education as indicated  - Encourage broncho-pulmonary hygiene including cough, deep breathe, Incentive Spirometry  - Assess the need for suctioning and aspirate as needed  - Assess and instruct to report SOB or any respiratory difficulty  - Respiratory Therapy support as indicated  Outcome: Progressing     Problem: GASTROINTESTINAL - ADULT  Goal: Minimal or absence of nausea and/or vomiting  Description: INTERVENTIONS:  - Administer IV fluids if ordered to ensure adequate hydration  - Maintain NPO status until nausea and vomiting are resolved  - Nasogastric tube if ordered  - Administer ordered antiemetic medications as needed  - Provide nonpharmacologic comfort measures as appropriate  - Advance diet as tolerated, if ordered  - Consider nutrition services referral to assist patient with adequate nutrition and appropriate food choices  Outcome: Progressing  Goal: Maintains adequate nutritional intake  Description: INTERVENTIONS:  - Monitor percentage of each meal consumed  - Identify factors contributing to decreased intake, treat as appropriate  - Assist with meals as needed  - Monitor I&O, weight, and lab values if indicated  - Obtain nutrition services referral as needed  Outcome: Progressing     Problem: SKIN/TISSUE INTEGRITY - ADULT  Goal: Incision(s), wounds(s) or drain site(s) healing without S/S of infection  Description: INTERVENTIONS  - Assess and document dressing, incision, wound bed, drain sites and surrounding tissue  - Provide patient and family education  - Perform skin care/dressing changes every 8 hours  Outcome: Progressing     Problem: PAIN - ADULT  Goal: Verbalizes/displays adequate comfort level or baseline comfort level  Description: Interventions:  - Encourage patient to monitor pain and request assistance  - Assess pain using appropriate pain scale  - Administer analgesics based on type and severity of pain and evaluate response  - Implement non-pharmacological measures as appropriate and evaluate response  - Consider cultural and social influences on pain and pain management  - Notify physician/advanced practitioner if interventions unsuccessful or patient reports new pain  Outcome: Progressing     Problem: INFECTION - ADULT  Goal: Absence or prevention of progression during hospitalization  Description: INTERVENTIONS:  - Assess and monitor for signs and symptoms of infection  - Monitor lab/diagnostic results  - Monitor all insertion sites, i e  indwelling lines, tubes, and drains  - Monitor endotracheal if appropriate and nasal secretions for changes in amount and color  - [place] appropriate cooling/warming therapies per order  - Administer medications as ordered  - Instruct and encourage patient and family to use good hand hygiene technique  - Identify and instruct in appropriate isolation precautions for identified infection/condition  Outcome: Progressing     Problem: DISCHARGE PLANNING  Goal: Discharge to home or other facility with appropriate resources  Description: INTERVENTIONS:  - Identify barriers to discharge w/patient and caregiver  - Arrange for needed discharge resources and transportation as appropriate  - Identify discharge learning needs (meds, wound care, etc )  - Arrange for interpretive services to assist at discharge as needed  - Refer to Case Management Department for coordinating discharge planning if the patient needs post-hospital services based on physician/advanced practitioner order or complex needs related to functional status, cognitive ability, or social support system  Outcome: Progressing     Problem: Knowledge Deficit  Goal: Patient/family/caregiver demonstrates understanding of disease process, treatment plan, medications, and discharge instructions  Description: Complete learning assessment and assess knowledge base    Interventions:  - Provide teaching at level of understanding  - Provide teaching via preferred learning methods  Outcome: Progressing

## 2022-06-18 NOTE — PLAN OF CARE
Problem: DISCHARGE PLANNING - CARE MANAGEMENT  Goal: Discharge to post-acute care or home with appropriate resources  Description: INTERVENTIONS:  - Conduct assessment to determine patient/family and health care team treatment goals, and need for post-acute services based on payer coverage, community resources, and patient preferences, and barriers to discharge  - Address psychosocial, clinical, and financial barriers to discharge as identified in assessment in conjunction with the patient/family and health care team  - Arrange appropriate level of post-acute services according to patients   needs and preference and payer coverage in collaboration with the physician and health care team  - Communicate with and update the patient/family, physician, and health care team regarding progress on the discharge plan  - Arrange appropriate transportation to post-acute venues  Outcome: Progressing     Problem: RESPIRATORY - ADULT  Goal: Achieves optimal ventilation and oxygenation  Description: INTERVENTIONS:  - Assess for changes in respiratory status  - Assess for changes in mentation and behavior  - Position to facilitate oxygenation and minimize respiratory effort  - Oxygen administered by appropriate delivery if ordered  - Initiate smoking cessation education as indicated  - Encourage broncho-pulmonary hygiene including cough, deep breathe, Incentive Spirometry  - Assess the need for suctioning and aspirate as needed  - Assess and instruct to report SOB or any respiratory difficulty  - Respiratory Therapy support as indicated  Outcome: Progressing     Problem: GASTROINTESTINAL - ADULT  Goal: Minimal or absence of nausea and/or vomiting  Description: INTERVENTIONS:  - Administer IV fluids if ordered to ensure adequate hydration  - Maintain NPO status until nausea and vomiting are resolved  - Nasogastric tube if ordered  - Administer ordered antiemetic medications as needed  - Provide nonpharmacologic comfort measures as appropriate  - Advance diet as tolerated, if ordered  - Consider nutrition services referral to assist patient with adequate nutrition and appropriate food choices  Outcome: Progressing  Goal: Maintains adequate nutritional intake  Description: INTERVENTIONS:  - Monitor percentage of each meal consumed  - Identify factors contributing to decreased intake, treat as appropriate  - Assist with meals as needed  - Monitor I&O, weight, and lab values if indicated  - Obtain nutrition services referral as needed  Outcome: Progressing     Problem: SKIN/TISSUE INTEGRITY - ADULT  Goal: Incision(s), wounds(s) or drain site(s) healing without S/S of infection  Description: INTERVENTIONS  - Assess and document dressing, incision, wound bed, drain sites and surrounding tissue  - Provide patient and family education  Outcome: Progressing     Problem: PAIN - ADULT  Goal: Verbalizes/displays adequate comfort level or baseline comfort level  Description: Interventions:  - Encourage patient to monitor pain and request assistance  - Assess pain using appropriate pain scale  - Administer analgesics based on type and severity of pain and evaluate response  - Implement non-pharmacological measures as appropriate and evaluate response  - Consider cultural and social influences on pain and pain management  - Notify physician/advanced practitioner if interventions unsuccessful or patient reports new pain  Outcome: Progressing     Problem: INFECTION - ADULT  Goal: Absence or prevention of progression during hospitalization  Description: INTERVENTIONS:  - Assess and monitor for signs and symptoms of infection  - Monitor lab/diagnostic results  - Monitor all insertion sites, i e  indwelling lines, tubes, and drains  - Monitor endotracheal if appropriate and nasal secretions for changes in amount and color  - [place] appropriate cooling/warming therapies per order  - Administer medications as ordered  - Instruct and encourage patient and family to use good hand hygiene technique  - Identify and instruct in appropriate isolation precautions for identified infection/condition  Outcome: Progressing     Problem: DISCHARGE PLANNING  Goal: Discharge to home or other facility with appropriate resources  Description: INTERVENTIONS:  - Identify barriers to discharge w/patient and caregiver  - Arrange for needed discharge resources and transportation as appropriate  - Identify discharge learning needs (meds, wound care, etc )  - Arrange for interpretive services to assist at discharge as needed  - Refer to Case Management Department for coordinating discharge planning if the patient needs post-hospital services based on physician/advanced practitioner order or complex needs related to functional status, cognitive ability, or social support system  Outcome: Progressing     Problem: Knowledge Deficit  Goal: Patient/family/caregiver demonstrates understanding of disease process, treatment plan, medications, and discharge instructions  Description: Complete learning assessment and assess knowledge base    Interventions:  - Provide teaching at level of understanding  - Provide teaching via preferred learning methods  Outcome: Progressing

## 2022-06-19 VITALS
TEMPERATURE: 98.7 F | HEART RATE: 41 BPM | SYSTOLIC BLOOD PRESSURE: 151 MMHG | DIASTOLIC BLOOD PRESSURE: 68 MMHG | HEIGHT: 64 IN | BODY MASS INDEX: 50.02 KG/M2 | OXYGEN SATURATION: 96 % | WEIGHT: 293 LBS | RESPIRATION RATE: 19 BRPM

## 2022-06-19 PROCEDURE — 99024 POSTOP FOLLOW-UP VISIT: CPT | Performed by: STUDENT IN AN ORGANIZED HEALTH CARE EDUCATION/TRAINING PROGRAM

## 2022-06-19 RX ORDER — OMEPRAZOLE 20 MG/1
20 CAPSULE, DELAYED RELEASE ORAL 2 TIMES DAILY
Qty: 60 CAPSULE | Refills: 1 | Status: SHIPPED | OUTPATIENT
Start: 2022-06-19 | End: 2022-07-19

## 2022-06-19 RX ORDER — ENOXAPARIN SODIUM 100 MG/ML
40 INJECTION SUBCUTANEOUS DAILY
Qty: 3.2 ML | Refills: 0 | Status: SHIPPED | OUTPATIENT
Start: 2022-06-19 | End: 2022-06-27

## 2022-06-19 RX ADMIN — FAMOTIDINE 20 MG: 20 TABLET ORAL at 09:21

## 2022-06-19 RX ADMIN — DEXAMETHASONE SODIUM PHOSPHATE 10 MG: 10 INJECTION INTRAMUSCULAR; INTRAVENOUS at 05:53

## 2022-06-19 RX ADMIN — ENOXAPARIN SODIUM 40 MG: 40 INJECTION SUBCUTANEOUS at 09:22

## 2022-06-19 RX ADMIN — FLUTICASONE FUROATE AND VILANTEROL TRIFENATATE 1 PUFF: 200; 25 POWDER RESPIRATORY (INHALATION) at 09:21

## 2022-06-19 RX ADMIN — OXYCODONE HYDROCHLORIDE 5 MG: 5 SOLUTION ORAL at 03:30

## 2022-06-19 RX ADMIN — SODIUM CHLORIDE, POTASSIUM CHLORIDE, SODIUM LACTATE AND CALCIUM CHLORIDE 100 ML/HR: 600; 310; 30; 20 INJECTION, SOLUTION INTRAVENOUS at 02:28

## 2022-06-19 NOTE — PLAN OF CARE
Problem: DISCHARGE PLANNING - CARE MANAGEMENT  Goal: Discharge to post-acute care or home with appropriate resources  Description: INTERVENTIONS:  - Conduct assessment to determine patient/family and health care team treatment goals, and need for post-acute services based on payer coverage, community resources, and patient preferences, and barriers to discharge  - Address psychosocial, clinical, and financial barriers to discharge as identified in assessment in conjunction with the patient/family and health care team  - Arrange appropriate level of post-acute services according to patients   needs and preference and payer coverage in collaboration with the physician and health care team  - Communicate with and update the patient/family, physician, and health care team regarding progress on the discharge plan  - Arrange appropriate transportation to post-acute venues  Outcome: Progressing     Problem: RESPIRATORY - ADULT  Goal: Achieves optimal ventilation and oxygenation  Description: INTERVENTIONS:  - Assess for changes in respiratory status  - Assess for changes in mentation and behavior  - Position to facilitate oxygenation and minimize respiratory effort  - Oxygen administered by appropriate delivery if ordered  - Initiate smoking cessation education as indicated  - Encourage broncho-pulmonary hygiene including cough, deep breathe, Incentive Spirometry  - Assess the need for suctioning and aspirate as needed  - Assess and instruct to report SOB or any respiratory difficulty  - Respiratory Therapy support as indicated  Outcome: Progressing     Problem: GASTROINTESTINAL - ADULT  Goal: Minimal or absence of nausea and/or vomiting  Description: INTERVENTIONS:  - Administer IV fluids if ordered to ensure adequate hydration  - Maintain NPO status until nausea and vomiting are resolved  - Nasogastric tube if ordered  - Administer ordered antiemetic medications as needed  - Provide nonpharmacologic comfort measures as appropriate  - Advance diet as tolerated, if ordered  - Consider nutrition services referral to assist patient with adequate nutrition and appropriate food choices  Outcome: Progressing  Goal: Maintains adequate nutritional intake  Description: INTERVENTIONS:  - Monitor percentage of each meal consumed  - Identify factors contributing to decreased intake, treat as appropriate  - Assist with meals as needed  - Monitor I&O, weight, and lab values if indicated  - Obtain nutrition services referral as needed  Outcome: Progressing     Problem: SKIN/TISSUE INTEGRITY - ADULT  Goal: Incision(s), wounds(s) or drain site(s) healing without S/S of infection  Description: INTERVENTIONS  - Assess and document dressing, incision, wound bed, drain sites and surrounding tissue  - Provide patient and family education  Outcome: Progressing     Problem: PAIN - ADULT  Goal: Verbalizes/displays adequate comfort level or baseline comfort level  Description: Interventions:  - Encourage patient to monitor pain and request assistance  - Assess pain using appropriate pain scale  - Administer analgesics based on type and severity of pain and evaluate response  - Implement non-pharmacological measures as appropriate and evaluate response  - Consider cultural and social influences on pain and pain management  - Notify physician/advanced practitioner if interventions unsuccessful or patient reports new pain  Outcome: Progressing     Problem: INFECTION - ADULT  Goal: Absence or prevention of progression during hospitalization  Description: INTERVENTIONS:  - Assess and monitor for signs and symptoms of infection  - Monitor lab/diagnostic results  - Monitor all insertion sites, i e  indwelling lines, tubes, and drains  - Monitor endotracheal if appropriate and nasal secretions for changes in amount and color  - [place] appropriate cooling/warming therapies per order  - Administer medications as ordered  - Instruct and encourage patient and family to use good hand hygiene technique  - Identify and instruct in appropriate isolation precautions for identified infection/condition  Outcome: Progressing     Problem: DISCHARGE PLANNING  Goal: Discharge to home or other facility with appropriate resources  Description: INTERVENTIONS:  - Identify barriers to discharge w/patient and caregiver  - Arrange for needed discharge resources and transportation as appropriate  - Identify discharge learning needs (meds, wound care, etc )  - Arrange for interpretive services to assist at discharge as needed  - Refer to Case Management Department for coordinating discharge planning if the patient needs post-hospital services based on physician/advanced practitioner order or complex needs related to functional status, cognitive ability, or social support system  Outcome: Progressing     Problem: Knowledge Deficit  Goal: Patient/family/caregiver demonstrates understanding of disease process, treatment plan, medications, and discharge instructions  Description: Complete learning assessment and assess knowledge base    Interventions:  - Provide teaching at level of understanding  - Provide teaching via preferred learning methods  Outcome: Progressing     Problem: Potential for Falls  Goal: Patient will remain free of falls  Description: INTERVENTIONS:  - Educate patient/family on patient safety including physical limitations  - Instruct patient to call for assistance with activity   - Consult OT/PT to assist with strengthening/mobility   - Keep Call bell within reach  - Keep bed low and locked with side rails adjusted as appropriate  - Keep care items and personal belongings within reach  - Initiate and maintain comfort rounds  - Make Fall Risk Sign visible to staff  - Apply yellow socks and bracelet for high fall risk patients  - Consider moving patient to room near nurses station  Outcome: Progressing

## 2022-06-19 NOTE — PROGRESS NOTES
Progress Note - Bariatric Surgery   Sharonda Dale 40 y o  female MRN: [id number]  Unit/Bed#: E5 -01 Encounter: [id number]      Subjective/Objective     Subjective:  Patient with morbid obesity POD5 s/p robotic sleeve gastrectomy  Patient denies fevers, chills, sweats, SOB, CP, calf pain  Pain adequately controlled on oral pain medication  Ambulating without assistance, voiding well, and using incentive spirometer  Patient reports improved epigastric pain and nausea  Denies emesis  Vital signs stable  Objective:    /68 (BP Location: Left arm)   Pulse (!) 41   Temp 98 7 °F (37 1 °C) (Oral)   Resp 19   Ht 5' 4" (1 626 m)   Wt (!) 170 kg (375 lb 10 6 oz)   SpO2 96%   Breastfeeding No   BMI 64 48 kg/m²       Intake/Output Summary (Last 24 hours) at 6/19/2022 0719  Last data filed at 6/18/2022 1830  Gross per 24 hour   Intake 1200 ml   Output --   Net 1200 ml       Invasive Devices  Report    Peripheral Intravenous Line  Duration           Peripheral IV 06/16/22 Left;Proximal;Ventral (anterior) Forearm 2 days                ROS: 10-point system completed  All negative except see HPI  Physical Exam    General Appearance:    Alert, cooperative, no distress, appears stated age   Head:    Normocephalic, without obvious abnormality, atraumatic   Lungs:     Respirations unlabored   Heart:    Regular rate and rhythm   Abdomen:     Soft, appropriate tenderness, no masses, no organomegaly, non-distended   Extremities:   Extremities normal, atraumatic, no cyanosis or edema   Neurologic:  Incision:  Psych:   Normal strength and sensation    Clean, dry, and intact, no bleeding    Normal mood and affect       Lab, Imaging and other studies:  I have personally reviewed pertinent lab results  VTE Mechanical Prophylaxis: sequential compression device    Assessment/Plan  1)  Patient with morbid Obesity s/p robotic Sleeve Gastrectomy with stable post op course    Patient afebrile and hemodynamically stable  - Encourage PO fluids  - Recommend ambulation, use of SCDs when not ambulating, and incentive spirometry  - Plan to D/C patient home today    Plan of care was discussed with patient    Care plan discussed with Dr Aleyda Dunlap MD  Bariatric Surgery  6/19/2022  7:19 AM

## 2022-06-19 NOTE — NURSING NOTE
Discharge instructions reviewed with pt  She is aware of prescriptions to be picked up, and follow-up appointments  Pt has no questions or concerns, she is looking forward to getting home and getting some sleep  SO is providing ride home

## 2022-06-20 ENCOUNTER — APPOINTMENT (EMERGENCY)
Dept: CT IMAGING | Facility: HOSPITAL | Age: 37
End: 2022-06-20
Payer: COMMERCIAL

## 2022-06-20 ENCOUNTER — TELEPHONE (OUTPATIENT)
Dept: MEDSURG UNIT | Facility: HOSPITAL | Age: 37
End: 2022-06-20

## 2022-06-20 ENCOUNTER — HOSPITAL ENCOUNTER (EMERGENCY)
Facility: HOSPITAL | Age: 37
Discharge: HOME | End: 2022-06-20
Attending: EMERGENCY MEDICINE
Payer: COMMERCIAL

## 2022-06-20 ENCOUNTER — TRANSITIONAL CARE MANAGEMENT (OUTPATIENT)
Dept: INTERNAL MEDICINE CLINIC | Facility: CLINIC | Age: 37
End: 2022-06-20

## 2022-06-20 VITALS
SYSTOLIC BLOOD PRESSURE: 141 MMHG | RESPIRATION RATE: 18 BRPM | DIASTOLIC BLOOD PRESSURE: 83 MMHG | WEIGHT: 293 LBS | TEMPERATURE: 97.5 F | OXYGEN SATURATION: 100 % | BODY MASS INDEX: 64.33 KG/M2 | HEART RATE: 57 BPM

## 2022-06-20 DIAGNOSIS — E87.6 HYPOKALEMIA: ICD-10-CM

## 2022-06-20 DIAGNOSIS — R11.2 NAUSEA AND VOMITING: ICD-10-CM

## 2022-06-20 DIAGNOSIS — G89.18 POST-OPERATIVE PAIN: Primary | ICD-10-CM

## 2022-06-20 LAB
ALBUMIN SERPL BCP-MCNC: 3.3 G/DL (ref 3.5–5)
ALP SERPL-CCNC: 42 U/L (ref 46–116)
ALT SERPL W P-5'-P-CCNC: 45 U/L (ref 12–78)
ANION GAP SERPL CALCULATED.3IONS-SCNC: 7 MMOL/L (ref 4–13)
AST SERPL W P-5'-P-CCNC: 18 U/L (ref 5–45)
BASOPHILS # BLD AUTO: 0.01 THOUSANDS/ΜL (ref 0–0.1)
BASOPHILS NFR BLD AUTO: 0 % (ref 0–1)
BILIRUB SERPL-MCNC: 1.03 MG/DL (ref 0.2–1)
BUN SERPL-MCNC: 18 MG/DL (ref 5–25)
CALCIUM ALBUM COR SERPL-MCNC: 8.7 MG/DL (ref 8.3–10.1)
CALCIUM SERPL-MCNC: 8.1 MG/DL (ref 8.3–10.1)
CHLORIDE SERPL-SCNC: 99 MMOL/L (ref 100–108)
CO2 SERPL-SCNC: 31 MMOL/L (ref 21–32)
CREAT SERPL-MCNC: 0.83 MG/DL (ref 0.6–1.3)
EOSINOPHIL # BLD AUTO: 0.01 THOUSAND/ΜL (ref 0–0.61)
EOSINOPHIL NFR BLD AUTO: 0 % (ref 0–6)
ERYTHROCYTE [DISTWIDTH] IN BLOOD BY AUTOMATED COUNT: 11.5 % (ref 11.6–15.1)
GFR SERPL CREATININE-BSD FRML MDRD: 90 ML/MIN/1.73SQ M
GLUCOSE SERPL-MCNC: 77 MG/DL (ref 65–140)
HCT VFR BLD AUTO: 40.8 % (ref 34.8–46.1)
HGB BLD-MCNC: 13.7 G/DL (ref 11.5–15.4)
IMM GRANULOCYTES # BLD AUTO: 0.08 THOUSAND/UL (ref 0–0.2)
IMM GRANULOCYTES NFR BLD AUTO: 1 % (ref 0–2)
LACTATE SERPL-SCNC: 1 MMOL/L (ref 0.5–2)
LIPASE SERPL-CCNC: 241 U/L (ref 73–393)
LYMPHOCYTES # BLD AUTO: 2.69 THOUSANDS/ΜL (ref 0.6–4.47)
LYMPHOCYTES NFR BLD AUTO: 32 % (ref 14–44)
MCH RBC QN AUTO: 32.5 PG (ref 26.8–34.3)
MCHC RBC AUTO-ENTMCNC: 33.6 G/DL (ref 31.4–37.4)
MCV RBC AUTO: 97 FL (ref 82–98)
MONOCYTES # BLD AUTO: 0.74 THOUSAND/ΜL (ref 0.17–1.22)
MONOCYTES NFR BLD AUTO: 9 % (ref 4–12)
NEUTROPHILS # BLD AUTO: 5.02 THOUSANDS/ΜL (ref 1.85–7.62)
NEUTS SEG NFR BLD AUTO: 58 % (ref 43–75)
NRBC BLD AUTO-RTO: 0 /100 WBCS
PLATELET # BLD AUTO: 205 THOUSANDS/UL (ref 149–390)
PMV BLD AUTO: 11.5 FL (ref 8.9–12.7)
POTASSIUM SERPL-SCNC: 3.1 MMOL/L (ref 3.5–5.3)
PROT SERPL-MCNC: 7.5 G/DL (ref 6.4–8.2)
RBC # BLD AUTO: 4.21 MILLION/UL (ref 3.81–5.12)
SODIUM SERPL-SCNC: 137 MMOL/L (ref 136–145)
WBC # BLD AUTO: 8.55 THOUSAND/UL (ref 4.31–10.16)

## 2022-06-20 PROCEDURE — 36415 COLL VENOUS BLD VENIPUNCTURE: CPT | Performed by: EMERGENCY MEDICINE

## 2022-06-20 PROCEDURE — 83605 ASSAY OF LACTIC ACID: CPT | Performed by: EMERGENCY MEDICINE

## 2022-06-20 PROCEDURE — G1004 CDSM NDSC: HCPCS

## 2022-06-20 PROCEDURE — 74176 CT ABD & PELVIS W/O CONTRAST: CPT

## 2022-06-20 PROCEDURE — 96375 TX/PRO/DX INJ NEW DRUG ADDON: CPT

## 2022-06-20 PROCEDURE — 83690 ASSAY OF LIPASE: CPT | Performed by: EMERGENCY MEDICINE

## 2022-06-20 PROCEDURE — 96374 THER/PROPH/DIAG INJ IV PUSH: CPT

## 2022-06-20 PROCEDURE — 99284 EMERGENCY DEPT VISIT MOD MDM: CPT

## 2022-06-20 PROCEDURE — 80053 COMPREHEN METABOLIC PANEL: CPT | Performed by: EMERGENCY MEDICINE

## 2022-06-20 PROCEDURE — 85025 COMPLETE CBC W/AUTO DIFF WBC: CPT | Performed by: EMERGENCY MEDICINE

## 2022-06-20 RX ORDER — METOCLOPRAMIDE HYDROCHLORIDE 5 MG/ML
10 INJECTION INTRAMUSCULAR; INTRAVENOUS ONCE
Status: COMPLETED | OUTPATIENT
Start: 2022-06-20 | End: 2022-06-20

## 2022-06-20 RX ORDER — ONDANSETRON 2 MG/ML
4 INJECTION INTRAMUSCULAR; INTRAVENOUS ONCE
Status: COMPLETED | OUTPATIENT
Start: 2022-06-20 | End: 2022-06-20

## 2022-06-20 RX ORDER — HYDROMORPHONE HCL/PF 1 MG/ML
1 SYRINGE (ML) INJECTION ONCE
Status: COMPLETED | OUTPATIENT
Start: 2022-06-20 | End: 2022-06-20

## 2022-06-20 RX ORDER — POTASSIUM CHLORIDE 750 MG/1
10 TABLET, EXTENDED RELEASE ORAL 2 TIMES DAILY
Qty: 20 TABLET | Refills: 0 | Status: SHIPPED | OUTPATIENT
Start: 2022-06-20

## 2022-06-20 RX ORDER — POTASSIUM CHLORIDE 20MEQ/15ML
20 LIQUID (ML) ORAL ONCE
Status: DISCONTINUED | OUTPATIENT
Start: 2022-06-20 | End: 2022-06-20 | Stop reason: HOSPADM

## 2022-06-20 RX ORDER — ONDANSETRON 4 MG/1
4 TABLET, ORALLY DISINTEGRATING ORAL EVERY 8 HOURS PRN
Qty: 20 TABLET | Refills: 0 | Status: SHIPPED | OUTPATIENT
Start: 2022-06-20

## 2022-06-20 RX ADMIN — HYDROMORPHONE HYDROCHLORIDE 1 MG: 1 INJECTION, SOLUTION INTRAMUSCULAR; INTRAVENOUS; SUBCUTANEOUS at 12:14

## 2022-06-20 RX ADMIN — METOCLOPRAMIDE 10 MG: 5 INJECTION, SOLUTION INTRAMUSCULAR; INTRAVENOUS at 16:06

## 2022-06-20 RX ADMIN — ONDANSETRON 4 MG: 2 INJECTION INTRAMUSCULAR; INTRAVENOUS at 12:14

## 2022-06-20 RX ADMIN — DIATRIZOATE MEGLUMINE AND DIATRIZOATE SODIUM 30 ML: 660; 100 LIQUID ORAL; RECTAL at 12:40

## 2022-06-20 NOTE — ED PROVIDER NOTES
History  Chief Complaint   Patient presents with    Surgical Problem Re-Evaluation     Pt reports being released yesterday after having a gastric sleeve placed  Reports abdominal pain, NV      Pt is a 40year old female with a PMH of gastric sleeve at 6/14/22 presenting with abdominal pain and vomiting  Pt states she was discharged yesterday and began to have periumbilical pain "sometime last night I dont know when"  States she has been following a clear liquid diet, and has been vomiting her stomach contents  She has not been able to tolerate liquids since pain began  She has been passing gas and had a BM  She denies fevers, chills, sweats, chest pain, SOB, back pain, urinary symptoms  She has been unable to take her pain medications due to symptoms  History provided by:  Patient   used: No    Abdominal Pain  Pain location:  Periumbilical  Pain quality: sharp    Pain radiates to:  Does not radiate  Pain severity:  Severe  Onset quality:  Gradual  Duration:  1 day  Timing:  Constant  Progression:  Worsening  Chronicity:  New  Context: previous surgery    Relieved by:  Nothing  Worsened by:  Nothing  Ineffective treatments:  Liquids  Associated symptoms: nausea and vomiting    Associated symptoms: no constipation and no diarrhea    Risk factors: multiple surgeries, obesity and recent hospitalization        Prior to Admission Medications   Prescriptions Last Dose Informant Patient Reported?  Taking?   acetaminophen (TYLENOL) 160 mg/5 mL suspension   No No   Sig: Take 30 4 mL (975 mg total) by mouth every 8 (eight) hours for 7 days   albuterol (2 5 mg/3 mL) 0 083 % nebulizer solution  Self Yes No   Sig: INHALE 1 VIAL VIA NEBULIZER EVERY 4 HOURS AS NEEDED   enoxaparin (Lovenox) 40 mg/0 4 mL   No No   Sig: Inject 0 4 mL (40 mg total) under the skin in the morning for 8 days   fexofenadine-pseudoephedrine (ALLEGRA-D 24) 180-240 MG per 24 hr tablet   No No   Sig: Take 1 tablet by mouth daily fluticasone-vilanterol (Breo Ellipta) 200-25 MCG/INH inhaler  Self No No   Sig: Inhale 1 puff daily Rinse mouth after use     ipratropium (ATROVENT) 0 02 % nebulizer solution   No No   Sig: Take 2 5 mL (0 5 mg total) by nebulization 3 (three) times a day   Patient taking differently: Take 0 5 mg by nebulization every 6 (six) hours as needed   montelukast (SINGULAIR) 10 mg tablet   No No   Sig: Take 1 tablet (10 mg total) by mouth daily at bedtime   omeprazole (PriLOSEC) 20 mg delayed release capsule   No No   Sig: Take 1 capsule (20 mg total) by mouth 2 (two) times a day   oxyCODONE (Roxicodone) 5 immediate release tablet   No No   Sig: Take 1 tablet (5 mg total) by mouth every 4 (four) hours as needed for moderate pain Max Daily Amount: 30 mg   Patient not taking: Reported on 5/31/2022      Facility-Administered Medications Last Administration Doses Remaining   etonogestrel (NEXPLANON) subdermal implant 68 mg None recorded 1          Past Medical History:   Diagnosis Date    Allergic rhinitis     Chronic pain     COVID-19 03/26/2021    Depression     pt states is resolved at this time    Insomnia     Mild intermittent asthma with exacerbation 03/23/2021    Obesity, Class III, BMI 40-49 9 (morbid obesity) ([facility] [address] ) 07/02/2012    Scoliosis     Seasonal allergies        Past Surgical History:   Procedure Laterality Date    ABCESS DRAINAGE      Incision and dranage of skin abscess    CHOLECYSTECTOMY      [place] LAP, NESS RESTRICT PROC, LONGITUDINAL GASTRECTOMY N/A 6/14/2022    Procedure: LAPAROSCOPIC SLEEVE GASTRECTOMY WITH ROBOTICS& INTRAOP EGD;  Surgeon: Blaire Xiong MD;  Location: [facility];  Service: Bariatrics    UPPER GASTROINTESTINAL ENDOSCOPY         Family History   Problem Relation Age of Onset    Other Father         Cardiac disorder    Hypertension Father     Diabetes Maternal Grandmother     Diabetes Mother     Hypertension Mother     Cervical cancer Mother      I have reviewed and agree with the history as documented  E-Cigarette/Vaping    E-Cigarette Use Never User      E-Cigarette/Vaping Substances    Nicotine No     THC No     CBD No     Flavoring No     Other No     Unknown No      Social History     Tobacco Use    Smoking status: Former Smoker    Smokeless tobacco: Never Used    Tobacco comment: quit 10 yrs ago   Vaping Use    Vaping Use: Never used   Substance Use Topics    Alcohol use: Never    Drug use: No       Review of Systems   Constitutional: Negative  HENT: Negative  Respiratory: Negative  Cardiovascular: Negative  Gastrointestinal: Positive for abdominal pain, nausea and vomiting  Negative for abdominal distention, constipation and diarrhea  Genitourinary: Negative  Musculoskeletal: Negative  Neurological: Negative  All other systems reviewed and are negative        Physical Exam  Physical Exam    Vital Signs  ED Triage Vitals   Temperature Pulse Respirations Blood Pressure SpO2   06/20/22 1116 06/20/22 1116 06/20/22 1116 06/20/22 1116 06/20/22 1116   97 5 °F (36 4 °C) 55 18 146/81 100 %      Temp Source Heart Rate Source Patient Position - Orthostatic VS BP Location FiO2 (%)   06/20/22 1116 06/20/22 1413 06/20/22 1413 06/20/22 1413 --   Oral Monitor Lying Right arm       Pain Score       06/20/22 1116       8           Vitals:    06/20/22 1116 06/20/22 1413   BP: 146/81 141/83   Pulse: 55 57   Patient Position - Orthostatic VS:  Lying         Visual Acuity      ED Medications  Medications   potassium chloride oral solution 20 mEq (20 mEq Oral Not Given 6/20/22 1412)   ondansetron (ZOFRAN) injection 4 mg (4 mg Intravenous Given 6/20/22 1214)   HYDROmorphone (DILAUDID) injection 1 mg (1 mg Intravenous Given 6/20/22 1214)   diatrizoate meglumine-sodium (GASTROGRAFIN) solution 30 mL (30 mL Oral Given 6/20/22 1240)       Diagnostic Studies  Results Reviewed     Procedure Component Value Units Date/Time    Comprehensive metabolic panel [[id number]] (Abnormal) Collected: 06/20/22 1214    Lab Status: Final result Specimen: Blood from Arm, Left Updated: 06/20/22 1258     Sodium 137 mmol/L      Potassium 3 1 mmol/L      Chloride 99 mmol/L      CO2 31 mmol/L      ANION GAP 7 mmol/L      BUN 18 mg/dL      Creatinine 0 83 mg/dL      Glucose 77 mg/dL      Calcium 8 1 mg/dL      Corrected Calcium 8 7 mg/dL      AST 18 U/L      ALT 45 U/L      Alkaline Phosphatase 42 U/L      Total Protein 7 5 g/dL      Albumin 3 3 g/dL      Total Bilirubin 1 03 mg/dL      eGFR 90 ml/min/1 73sq m     Narrative:      [place] guidelines for Chronic Kidney Disease (CKD):     Stage 1 with normal or high GFR (GFR > 90 mL/min/1 73 square meters)    Stage 2 Mild CKD (GFR = 60-89 mL/min/1 73 square meters)    Stage 3A Moderate CKD (GFR = 45-59 mL/min/1 73 square meters)    Stage 3B Moderate CKD (GFR = 30-44 mL/min/1 73 square meters)    Stage 4 Severe CKD (GFR = 15-29 mL/min/1 73 square meters)    Stage 5 End Stage CKD (GFR <15 mL/min/1 73 square meters)  Note: GFR calculation is accurate only with a steady state creatinine    Lipase [[id number]]  (Normal) Collected: 06/20/22 1214    Lab Status: Final result Specimen: Blood from Arm, Left Updated: 06/20/22 1258     Lipase 241 u/L     Lactic acid, plasma [[id number]]  (Normal) Collected: 06/20/22 1214    Lab Status: Final result Specimen: Blood from Arm, Left Updated: 06/20/22 1251     LACTIC ACID 1 0 mmol/L     Narrative:      Result may be elevated if tourniquet was used during collection      CBC and differential [[id number]]  (Abnormal) Collected: 06/20/22 1214    Lab Status: Final result Specimen: Blood from Arm, Left Updated: 06/20/22 1226     WBC 8 55 Thousand/uL      RBC 4 21 Million/uL      Hemoglobin 13 7 g/dL      Hematocrit 40 8 %      MCV 97 fL      MCH 32 5 pg      MCHC 33 6 g/dL      RDW 11 5 %      MPV 11 5 fL      Platelets 978 Thousands/uL      nRBC 0 /100 WBCs      Neutrophils Relative 58 % Immat GRANS % 1 %      Lymphocytes Relative 32 %      Monocytes Relative 9 %      Eosinophils Relative 0 %      Basophils Relative 0 %      Neutrophils Absolute 5 02 Thousands/µL      Immature Grans Absolute 0 08 Thousand/uL      Lymphocytes Absolute 2 69 Thousands/µL      Monocytes Absolute 0 74 Thousand/µL      Eosinophils Absolute 0 01 Thousand/µL      Basophils Absolute 0 01 Thousands/µL     POCT pregnancy, urine [[id number]]     Lab Status: No result                  CT abdomen pelvis wo contrast   Final Result by Alfred Torres MD (06/20 1304)      Status post gastric sleeve surgery  No bowel obstruction or contrast extravasation  Mild peripancreatic inflammatory stranding  Correlation for acute pancreatitis is advised  Workstation performed: [id number]                    Procedures  Procedures         ED Course  ED Course as of 06/20/22 1425   Mon Jun 20, 2022   1310 CT showing potential pancreatitis with stranding  Lipase is normal currently  She is mildly hypokalemic but otherwise labs are normal  She did improve with Dilaudid and Zofran  I reached out to bariatrics on call for further management  18 Spoke to [facility] with bariatric surgery  Based on findings, she recommended that we PO challenge patient  There is not much else indicated at this time  She has outpatient follow up set up with bariatrics  1402 Pt tolerating PO at this time  Will prescribe her Zofran and discharge  21  Pt refused potassium in ED                                               MDM  Number of Diagnoses or Management Options  Hypokalemia: new and requires workup  Nausea and vomiting: new and requires workup  Post-operative pain: new and requires workup     Amount and/or Complexity of Data Reviewed  Clinical lab tests: ordered and reviewed  Tests in the radiology section of CPT®: ordered and reviewed  Tests in the medicine section of CPT®: ordered and reviewed  Decide to obtain previous medical records or to obtain history from someone other than the patient: yes  Obtain history from someone other than the patient: yes  Review and summarize past medical records: yes  Discuss the patient with other providers: yes  Independent visualization of images, tracings, or specimens: yes    Risk of Complications, Morbidity, and/or Mortality  Presenting problems: high  Management options: high    Patient Progress  Patient progress: improved      Disposition  Final diagnoses:   Post-operative pain   Nausea and vomiting   Hypokalemia     Time reflects when diagnosis was documented in both MDM as applicable and the Disposition within this note     Time User Action Codes Description Comment    6/20/2022  2:23 PM Murray Cota Add [G89 18] Post-operative pain     6/20/2022  2:23 PM Ela Duty B Add [R11 2] Nausea and vomiting     6/20/2022  2:24 PM Murray Cota Add [E87 6] Hypokalemia       ED Disposition     ED Disposition   Discharge    Condition   Good    Date/Time   Mon Jun 20, 2022  2:23 PM    Comment   [id number] discharge to home/self care                 Follow-up Information     Follow up With Specialties Details Why Contact Info Additional [facility] Bariatrics Go in 3 days  [address] [address]  [address] [place] [address], [address] [place] [address], [id number]   [phone]          Patient's Medications   Discharge Prescriptions    ONDANSETRON (ZOFRAN-ODT) 4 MG DISINTEGRATING TABLET    Take 1 tablet (4 mg total) by mouth every 8 (eight) hours as needed for nausea       Start Date: 6/20/2022 End Date: --       Order Dose: 4 mg       Quantity: 20 tablet    Refills: 0    POTASSIUM CHLORIDE (K-DUR,KLOR-CON) 10 MEQ TABLET    Take 1 tablet (10 mEq total) by mouth 2 (two) times a day       Start Date: 6/20/2022 End Date: --       Order Dose: 10 mEq       Quantity: 20 tablet    Refills: 0       No discharge procedures on file      PDMP Review       Value Time User    PDMP Reviewed  Yes 5/31/2022  8:55 AM Gurdeep Sarabia PA-C          ED Provider  Electronically Signed by           Amy Galindo PA-C  06/20/22 2802

## 2022-06-20 NOTE — ED NOTES
Pt refused to go to CT scan without zofran and pain medications  Pt yelling at [facility]        [address], JEAN  06/20/22 9670

## 2022-06-20 NOTE — TELEPHONE ENCOUNTER
Post op d/c follow up phone call attempted  No answer obtained    Pt's medical record indicates she is currently in the ED with abd pain/n/v

## 2022-06-20 NOTE — ED ATTENDING ATTESTATION
6/20/2022  I, Lobo Lira MD, saw and evaluated the patient  I have discussed the patient with the resident/non-physician practitioner and agree with the resident's/non-physician practitioner's findings, Plan of Care, and MDM as documented in the resident's/non-physician practitioner's note, except where noted  All available labs and Radiology studies were reviewed  I was present for key portions of any procedure(s) performed by the resident/non-physician practitioner and I was immediately available to provide assistance  At this point I agree with the current assessment done in the Emergency Department  I have conducted an independent evaluation of this patient a history and physical is as follows:    Gastric sleeve on June 14th and just got discharged yesterday  Complains of pain supraumbilical in the center with vomiting can not keep anything down  She is taking pain medicine but can not keep that down  The abdominal surgical incisions appear normal   Currently she has received pain medication and I do not appreciate any obvious areas of tenderness/rebound or guarding  CT is pending  White count so far is normal   Will discuss with bariatric pending results      ED Course         Critical Care Time  Procedures

## 2022-06-21 ENCOUNTER — TELEPHONE (OUTPATIENT)
Dept: BARIATRICS | Facility: CLINIC | Age: 37
End: 2022-06-21

## 2022-06-21 NOTE — TELEPHONE ENCOUNTER
Called pt to check on her after discharge  She c/o continued abdominal pain and and n/v  She was seen in ER yesterday after and extended hospital stay after surgery  She states that she cannot drink anything without being nauseous and only drank 5-6 oz of fluids today  Last vomited this morning  Nausea medications do not help  Pt verbalized being upset about being released from the hospital yesterday and that she feels that she is not getting better as everyone tells her she will  She states her pain is still not any better either  She verbalized her regret of having the surgery and we discussed that those feelings are not uncommon during this time  She then no longer was listening but only continuing to speak  I told her I would reach out to the team and get back to her  I returned the call to her to let her know she may need to be reevaluted in the ER  Especially if she cannot increase her hydration and stop vomiting  She agrees to this plan  In the meantime she still needs to work on frequent walking

## 2022-06-22 ENCOUNTER — TELEPHONE (OUTPATIENT)
Dept: BARIATRICS | Facility: CLINIC | Age: 37
End: 2022-06-22

## 2022-06-22 NOTE — TELEPHONE ENCOUNTER
Spoke with pt  Pt doing much better today  She is less nauseous and pain is a slightly decreased  Hydration-still taking less than she should but much more than she was  Estimates about 15 oz so far today  Ambulating more  Encouraged her to use a suppository today since she has not a b/m since surgery  She also has not been taking her omeprazole so we discussed way to take it and how very important it is to begin it today and take it every day ongoing  She agrees to begin taking it today  Her follow up in office is tomorrow

## 2022-06-23 ENCOUNTER — OFFICE VISIT (OUTPATIENT)
Dept: BARIATRICS | Facility: CLINIC | Age: 37
End: 2022-06-23

## 2022-06-23 VITALS
DIASTOLIC BLOOD PRESSURE: 72 MMHG | HEART RATE: 62 BPM | OXYGEN SATURATION: 97 % | TEMPERATURE: 96.8 F | BODY MASS INDEX: 50.02 KG/M2 | SYSTOLIC BLOOD PRESSURE: 124 MMHG | HEIGHT: 64 IN | WEIGHT: 293 LBS

## 2022-06-23 DIAGNOSIS — Z98.84 BARIATRIC SURGERY STATUS: Primary | ICD-10-CM

## 2022-06-23 DIAGNOSIS — E66.01 MORBID (SEVERE) OBESITY DUE TO EXCESS CALORIES (HCC): Primary | ICD-10-CM

## 2022-06-23 DIAGNOSIS — Z98.84 BARIATRIC SURGERY STATUS: ICD-10-CM

## 2022-06-23 PROCEDURE — RECHECK: Performed by: DIETITIAN, REGISTERED

## 2022-06-23 PROCEDURE — 99024 POSTOP FOLLOW-UP VISIT: CPT | Performed by: SURGERY

## 2022-06-23 PROCEDURE — 3008F BODY MASS INDEX DOCD: CPT | Performed by: SURGERY

## 2022-06-23 NOTE — PROGRESS NOTES
Weight Management Nutrition Class     Diagnosis: Morbid Obesity    Bariatric Surgeon: Dr Omi Gutierrez    Surgery: Vertical Sleeve Gastrectomy    Class: first post op note    Topics discussed today include:     fluid goals post op, protein goals post op, constipation, chew food well, exercise, avoidance of alcohol, PPI use, diet progression, hypoglycemia, dumping syndrome, protein supplems, vitamin/mineral supplements, calcium supplements and iron supplements    Patient was able to verbalize basic diet (protein, fluid, vitamin and mineral) recommendations and possible nutrition-related complications   Yes

## 2022-06-23 NOTE — PROGRESS NOTES
POST OP UP VISIT - BARIATRIC SURGERY  Lei Bell 40 y o  female MRN: [id number]  Unit/Bed#:  Encounter: [id number]      HPI:  Lei Bell is a 40 y o  female status post robotic sleeve gastrectomy performed by Dr Karli Amos on 6/14/22 returning to office for first post op visit since surgery  Of note patient stayed five days in the hospital for pain control  She notes that her pain has improved as has her swallowing  Tolerating pureed diet  Denies nausea and vomiting  Taking multivitamins and PPI daily  Administering lovenox injections  Review of Systems   Constitutional: Negative for chills and fever  HENT: Negative for ear pain and sore throat  Eyes: Negative for pain and visual disturbance  Respiratory: Negative for cough and shortness of breath  Cardiovascular: Negative for chest pain and palpitations  Gastrointestinal: Negative for abdominal pain and vomiting  Genitourinary: Negative for dysuria and hematuria  Musculoskeletal: Negative for arthralgias and back pain  Skin: Negative for color change and rash  Neurological: Negative for seizures and syncope  All other systems reviewed and are negative        Historical Information   Past Medical History:   Diagnosis Date    Allergic rhinitis     Chronic pain     COVID-19 03/26/2021    Depression     pt states is resolved at this time    Insomnia     Mild intermittent asthma with exacerbation 03/23/2021    Obesity, Class III, BMI 40-49 9 (morbid obesity) ([facility] [address] ) 07/02/2012    Scoliosis     Seasonal allergies      Past Surgical History:   Procedure Laterality Date    ABCESS DRAINAGE      Incision and dranage of skin abscess    CHOLECYSTECTOMY      ND LAP, NESS RESTRICT PROC, LONGITUDINAL GASTRECTOMY N/A 6/14/2022    Procedure: LAPAROSCOPIC SLEEVE GASTRECTOMY WITH ROBOTICS& INTRAOP EGD;  Surgeon: Alfreda Us MD;  Location: [facility];  Service: [address] [place] [address] GASTROINTESTINAL ENDOSCOPY       Social History Social History     Substance and Sexual Activity   Alcohol Use Never     Social History     Substance and Sexual Activity   Drug Use No     Social History     Tobacco Use   Smoking Status Former Smoker   Smokeless Tobacco Never Used   Tobacco Comment    quit 10 yrs ago     Family History: non-contributory    Meds/Allergies   all medications and allergies reviewed  Allergies   Allergen Reactions    Ambrosia Artemisiifolia (Ragweed) Skin Test     Dust Mite Extract     Pollen Extract        Objective       Current Vitals:   Blood Pressure: 124/72 (06/23/22 0955)  Pulse: 62 (06/23/22 0955)  Temperature: (!) 96 8 °F (36 °C) (06/23/22 0955)  Temp Source: Tympanic (06/23/22 0955)  Height: 5' 4" (162 6 cm) (06/23/22 0955)  Weight - Scale: (!) 164 kg (362 lb 8 oz) (06/23/22 0955)  SpO2: 97 % (06/23/22 0955)      Invasive Devices  Report    None                 Physical Exam  Constitutional:       Appearance: Normal appearance  She is obese  HENT:      Head: Normocephalic and atraumatic  Cardiovascular:      Rate and Rhythm: Normal rate  Pulmonary:      Effort: Pulmonary effort is normal    Abdominal:      Comments: Incisions c/d/i, no evidence of infection, healing well   Musculoskeletal:         General: Normal range of motion  Skin:     General: Skin is warm and dry  Neurological:      General: No focal deficit present  Mental Status: She is alert and oriented to person, place, and time  Psychiatric:         Mood and Affect: Mood normal          Behavior: Behavior normal              Assessment/PLAN:    40 y o  female status post robotic sleeve gastrectomy done on 6/14/22 by Dr Marcin Ramos, doing well post op  No major issues and healing well  The pathology report was reviewed with the patient and the results were within normal limits  Pathology, and Other Studies: I have personally reviewed pertinent reports  Final Diagnosis   A   Stomach, sleeve gastrectomy:  - Segment of full-thickness stomach with mild chronic inactive gastritis  - Negative for intestinal metaplasia, dysplasia or carcinoma  - Immunohistochemistry for Helicobacter pylori is positive  Increase physical activity slowly as tolerated and instructed  Advance diet as instructed by our dietitians today and as indicated in the binder  Continue Lovenox prophylaxis until completed  Continue PPI    Follow up in one month as scheduled          Aundrea Rogers MD  Bariatric Surgery   6/23/2022  10:03 AM

## 2022-06-24 DIAGNOSIS — A04.8 H. PYLORI INFECTION: Primary | ICD-10-CM

## 2022-06-24 RX ORDER — OMEPRAZOLE 20 MG/1
20 CAPSULE, DELAYED RELEASE ORAL 2 TIMES DAILY
Qty: 28 CAPSULE | Refills: 0 | Status: SHIPPED | OUTPATIENT
Start: 2022-06-24 | End: 2022-07-06 | Stop reason: SDUPTHER

## 2022-06-24 RX ORDER — CLARITHROMYCIN 500 MG/1
500 TABLET, COATED ORAL EVERY 12 HOURS SCHEDULED
Qty: 28 TABLET | Refills: 0 | Status: SHIPPED | OUTPATIENT
Start: 2022-06-24 | End: 2022-07-08

## 2022-06-24 RX ORDER — AMOXICILLIN 500 MG/1
CAPSULE ORAL
Qty: 56 CAPSULE | Refills: 0 | Status: SHIPPED | OUTPATIENT
Start: 2022-06-24 | End: 2022-07-08

## 2022-06-27 ENCOUNTER — RA CDI HCC (OUTPATIENT)
Dept: OTHER | Facility: HOSPITAL | Age: 37
End: 2022-06-27

## 2022-06-27 NOTE — PROGRESS NOTES
[facility]  coding opportunities       Chart reviewed, no opportunity found: CHART REVIEWED, NO OPPORTUNITY FOUND        Patients Insurance        Commercial Insurance: [employer]

## 2022-07-01 DIAGNOSIS — R11.0 NAUSEA: Primary | ICD-10-CM

## 2022-07-01 RX ORDER — ONDANSETRON 4 MG/1
4 TABLET, ORALLY DISINTEGRATING ORAL EVERY 6 HOURS PRN
Qty: 20 TABLET | Refills: 0 | Status: SHIPPED | OUTPATIENT
Start: 2022-07-01

## 2022-07-05 ENCOUNTER — OFFICE VISIT (OUTPATIENT)
Dept: INTERNAL MEDICINE CLINIC | Facility: CLINIC | Age: 37
End: 2022-07-05
Payer: COMMERCIAL

## 2022-07-05 VITALS
SYSTOLIC BLOOD PRESSURE: 122 MMHG | TEMPERATURE: 98.6 F | HEIGHT: 64 IN | BODY MASS INDEX: 50.02 KG/M2 | WEIGHT: 293 LBS | DIASTOLIC BLOOD PRESSURE: 90 MMHG

## 2022-07-05 DIAGNOSIS — K21.9 GASTROESOPHAGEAL REFLUX DISEASE WITHOUT ESOPHAGITIS: ICD-10-CM

## 2022-07-05 DIAGNOSIS — Z98.84 BARIATRIC SURGERY STATUS: ICD-10-CM

## 2022-07-05 DIAGNOSIS — A04.8 H. PYLORI INFECTION: Primary | ICD-10-CM

## 2022-07-05 DIAGNOSIS — E66.01 MORBID (SEVERE) OBESITY DUE TO EXCESS CALORIES (HCC): ICD-10-CM

## 2022-07-05 PROBLEM — Z01.818 PREOPERATIVE CLEARANCE: Status: RESOLVED | Noted: 2022-01-27 | Resolved: 2022-07-05

## 2022-07-05 PROCEDURE — 99214 OFFICE O/P EST MOD 30 MIN: CPT | Performed by: INTERNAL MEDICINE

## 2022-07-05 PROCEDURE — 1111F DSCHRG MED/CURRENT MED MERGE: CPT | Performed by: INTERNAL MEDICINE

## 2022-07-05 NOTE — ASSESSMENT & PLAN NOTE
-s/p lap sleeve gastrectomy 2/96  -complicated by nausea but also found to have recurrent H  Pylori  -on PPI bid    -follow up with Bariatrics

## 2022-07-05 NOTE — PROGRESS NOTES
Assessment/Plan:    Problem List Items Addressed This Visit        Digestive    Gastroesophageal reflux disease without esophagitis     -with recurrent h  Pylori  -on PPI bid  Will start rest of H  Pylori regimen one month postop  -follow up with Bariatrics              Other    Morbid (severe) obesity due to excess calories ([facility] [address] )     -s/p lap sleeve gastrectomy 4/24  -complicated by nausea but also found to have recurrent H  Pylori  -on PPI bid    -follow up with Bariatrics           H  pylori infection - Primary     -recurrent  -has amoxicillin, clarithromycin and PPI to start one month postop  -follow up with Bariatrics             Other Visit Diagnoses     Bariatric surgery status              Subjective:      Patient ID: Robe Randall is a 40 y o  female  HPI  46yo female with asthma, MDD, vitamin D def here for follow up care  Had elective lap sleeve gastrectomy 6/14-6/19/22  Postop had incisional pain and nausea  She is taking zofran bid and omeprazole 20mg to twice daily  Had intraop EGD that showed return of H pylori and will be started on treatment one month after surgery  She is following a pureed diet, did not tolerate soft diet  Reports incisions are healing but has soreness when she leans forward  She gets easily winded  The heat has been horrible for her, has had dizzy spells  Start of weight loss program was 440pounds  She no longer has back and sciatica pain      The following portions of the patient's history were reviewed and updated as appropriate: allergies, current medications, past family history, past medical history, past social history, past surgical history and problem list       Current Outpatient Medications:     albuterol (2 5 mg/3 mL) 0 083 % nebulizer solution, INHALE 1 VIAL VIA NEBULIZER EVERY 4 HOURS AS NEEDED, Disp: , Rfl: 1    amoxicillin (AMOXIL) 500 mg capsule, Take 2 capsules twice daily for 2 weeks, Disp: 56 capsule, Rfl: 0    clarithromycin (BIAXIN) 500 mg tablet, Take 1 tablet (500 mg total) by mouth every 12 (twelve) hours for 14 days, Disp: 28 tablet, Rfl: 0    fexofenadine-pseudoephedrine (ALLEGRA-D 24) 180-240 MG per 24 hr tablet, Take 1 tablet by mouth daily, Disp: 30 tablet, Rfl: 0    omeprazole (PriLOSEC) 20 mg delayed release capsule, Take 1 capsule (20 mg total) by mouth 2 (two) times a day, Disp: 60 capsule, Rfl: 1    omeprazole (PriLOSEC) 20 mg delayed release capsule, Take 1 capsule (20 mg total) by mouth 2 (two) times a day for 14 days, Disp: 28 capsule, Rfl: 0    ondansetron (Zofran ODT) 4 mg disintegrating tablet, Take 1 tablet (4 mg total) by mouth every 6 (six) hours as needed for nausea or vomiting, Disp: 20 tablet, Rfl: 0    ondansetron (ZOFRAN-ODT) 4 mg disintegrating tablet, Take 1 tablet (4 mg total) by mouth every 8 (eight) hours as needed for nausea, Disp: 20 tablet, Rfl: 0    enoxaparin (Lovenox) 40 mg/0 4 mL, Inject 0 4 mL (40 mg total) under the skin in the morning for 8 days, Disp: 3 2 mL, Rfl: 0    fluticasone-vilanterol (Breo Ellipta) 200-25 MCG/INH inhaler, Inhale 1 puff daily Rinse mouth after use , Disp: 180 blister, Rfl: 0    ipratropium (ATROVENT) 0 02 % nebulizer solution, Take 2 5 mL (0 5 mg total) by nebulization 3 (three) times a day (Patient taking differently: Take 0 5 mg by nebulization every 6 (six) hours as needed), Disp: 225 mL, Rfl: 0    montelukast (SINGULAIR) 10 mg tablet, Take 1 tablet (10 mg total) by mouth daily at bedtime, Disp: 90 tablet, Rfl: 1    oxyCODONE (Roxicodone) 5 immediate release tablet, Take 1 tablet (5 mg total) by mouth every 4 (four) hours as needed for moderate pain Max Daily Amount: 30 mg (Patient not taking: Reported on 7/5/2022), Disp: 10 tablet, Rfl: 0    potassium chloride (K-DUR,KLOR-CON) 10 mEq tablet, Take 1 tablet (10 mEq total) by mouth 2 (two) times a day (Patient not taking: No sig reported), Disp: 20 tablet, Rfl: 0    Current Facility-Administered Medications:    etonogestrel (NEXPLANON) subdermal implant 68 mg, 68 mg, Subdermal, Once, Aliya Paul MD    Review of Systems   Constitutional: Positive for activity change, appetite change and unexpected weight change  Respiratory: Positive for chest tightness and shortness of breath (CODY)  Negative for cough and wheezing  Cardiovascular: Negative for chest pain, palpitations and leg swelling  Gastrointestinal: Positive for abdominal pain and nausea  Negative for blood in stool, constipation, diarrhea and vomiting  Neurological: Positive for dizziness and headaches  Objective:    /90 (BP Location: Left arm, Patient Position: Sitting)   Temp 98 6 °F (37 °C) (Tympanic)   Ht 5' 4" (1 626 m)   Wt (!) 158 kg (347 lb 12 8 oz)   BMI 59 70 kg/m²      Physical Exam  Vitals reviewed  Constitutional:       General: She is not in acute distress  Appearance: She is obese  She is not diaphoretic  Cardiovascular:      Rate and Rhythm: Normal rate and regular rhythm  Pulses: Normal pulses  Heart sounds: Normal heart sounds  Pulmonary:      Effort: Pulmonary effort is normal  No respiratory distress  Breath sounds: Normal breath sounds  No wheezing  Abdominal:      General: Bowel sounds are normal       Palpations: Abdomen is soft  Comments: Trochar incisional sites clean, nontender   Musculoskeletal:      Right lower leg: No edema  Left lower leg: No edema  Neurological:      Mental Status: She is alert and oriented to person, place, and time

## 2022-07-05 NOTE — ASSESSMENT & PLAN NOTE
-recurrent  -has amoxicillin, clarithromycin and PPI to start one month postop  -follow up with [facility]

## 2022-07-05 NOTE — ASSESSMENT & PLAN NOTE
-with recurrent h  Pylori  -on PPI bid  Will start rest of H   Pylori regimen one month postop  -follow up with Bariatrics

## 2022-07-06 DIAGNOSIS — A04.8 H. PYLORI INFECTION: ICD-10-CM

## 2022-07-06 RX ORDER — OMEPRAZOLE 20 MG/1
20 CAPSULE, DELAYED RELEASE ORAL 2 TIMES DAILY
Qty: 180 CAPSULE | Refills: 1 | Status: SHIPPED | OUTPATIENT
Start: 2022-07-06 | End: 2022-10-04

## 2022-07-18 ENCOUNTER — TELEPHONE (OUTPATIENT)
Dept: BARIATRICS | Facility: CLINIC | Age: 37
End: 2022-07-18

## 2022-07-18 ENCOUNTER — TELEPHONE (OUTPATIENT)
Dept: INFUSION CENTER | Facility: CLINIC | Age: 37
End: 2022-07-18

## 2022-07-18 DIAGNOSIS — Z98.84 BARIATRIC SURGERY STATUS: Primary | ICD-10-CM

## 2022-07-18 DIAGNOSIS — A04.8 H. PYLORI INFECTION: ICD-10-CM

## 2022-07-18 DIAGNOSIS — R11.2 NAUSEA AND VOMITING: ICD-10-CM

## 2022-07-18 DIAGNOSIS — R11.2 NAUSEA AND VOMITING, UNSPECIFIED VOMITING TYPE: ICD-10-CM

## 2022-07-18 NOTE — TELEPHONE ENCOUNTER
Called patient to let her know there may be availability at [facility] to provide IV hydration  Provided patient both [facility] infusion center and also [facility] to see if they can fit her in for IV hydration  Patient understands the plan of care  Will call back if she is unable to schedule

## 2022-07-18 NOTE — TELEPHONE ENCOUNTER
Only infusion site near Pt's home address that looks to have availability is [facility]  Please call them to schedule  Their number is [phone]

## 2022-07-18 NOTE — TELEPHONE ENCOUNTER
Pt left a voice message asking for appt scheduled on 07/20 to be moved to an 0800 am time only  I called pt back and spoke with her about rescheduling for a different date because the time she was requesting would not be available in the upcoming days  She stated that the 08/02 date I gave her was not soon enough and she'd prefer to cancel her appts completely

## 2022-07-18 NOTE — TELEPHONE ENCOUNTER
Patient reached out to our office with c/o of Nausea, vomiting and diarrhea from starting her antibiotics for H  Pylori eradication  Was advised to start on probiotics and to make sure she is taking her omeprazole twice daily  Offered IV infusions for hydration and patient is agreeable  Attempted to call patient to report appt times and therapy plan however was unable to reach at this time  Voicemail left of IV infusions set up date and time  Provided patient with information  First session on Wednesday, July 20th at 12:30 pm and Friday, July 29th at 10:30 am  Provided patient with infusion center contact number if she would like to reschedule

## 2022-07-26 NOTE — PROGRESS NOTES
Weight Management Nutrition Class     Diagnosis: Morbid Obesity    Bariatric Surgeon: Dr Roberto Gleason    Surgery: Vertical Sleeve Gastrectomy    Class: 5 week post op     Topics discussed today include:     fluid goals post op, protein goals post op, constipation, chew food well, exercise, avoidance of alcohol, PPI use, diet progression, hypoglycemia, dumping syndrome, protein supplems, vitamin/mineral supplements and calcium supplements    Patient was able to verbalize basic diet (protein, fluid, vitamin and mineral) recommendations and possible nutrition-related complications   Yes

## 2022-07-27 ENCOUNTER — CLINICAL SUPPORT (OUTPATIENT)
Dept: BARIATRICS | Facility: CLINIC | Age: 37
End: 2022-07-27

## 2022-07-27 DIAGNOSIS — Z98.84 BARIATRIC SURGERY STATUS: ICD-10-CM

## 2022-07-27 DIAGNOSIS — E66.01 MORBID (SEVERE) OBESITY DUE TO EXCESS CALORIES (HCC): Primary | ICD-10-CM

## 2022-07-27 PROCEDURE — RECHECK: Performed by: DIETITIAN, REGISTERED

## 2022-08-05 NOTE — QUICK NOTE
Physical Exam  Constitutional:       Appearance Well-developed  HENT:      Head: Normocephalic and atraumatic  Nose: Nose normal    Eyes:      Extraocular Movements: Extraocular movements intact  Conjunctiva/sclera: Conjunctivae normal    Neck:      Comments: No tenderness to palpation midline or paraspinal, neck supple, full ROM  Cardiovascular:      Rate and Rhythm: Normal rate  Pulmonary:      Effort: Pulmonary effort is normal  No respiratory distress  Breath sounds: Normal breath sounds  No stridor  No wheezing, rhonchi or rales  Abdominal:  Abdomen is soft with generalized tenderness to palpation  There is no distension  Musculoskeletal:      Comments: Full ROM all extremities   Skin:     General: Skin is warm  Capillary Refill: Capillary refill takes less than 2 seconds  Neurological:      Mental Status: She is alert and oriented to person, place, and time

## 2022-09-07 ENCOUNTER — HOSPITAL ENCOUNTER (EMERGENCY)
Facility: HOSPITAL | Age: 37
Discharge: HOME | End: 2022-09-07
Attending: EMERGENCY MEDICINE
Payer: COMMERCIAL

## 2022-09-07 VITALS
WEIGHT: 293 LBS | DIASTOLIC BLOOD PRESSURE: 92 MMHG | OXYGEN SATURATION: 97 % | RESPIRATION RATE: 18 BRPM | TEMPERATURE: 97.8 F | SYSTOLIC BLOOD PRESSURE: 150 MMHG | BODY MASS INDEX: 54.23 KG/M2 | HEART RATE: 90 BPM

## 2022-09-07 DIAGNOSIS — N39.0 UTI (URINARY TRACT INFECTION): Primary | ICD-10-CM

## 2022-09-07 LAB
BACTERIA UR QL AUTO: ABNORMAL /HPF
BILIRUB UR QL STRIP: ABNORMAL
CLARITY UR: ABNORMAL
COLOR UR: YELLOW
EXT PREG TEST URINE: NEGATIVE
EXT. CONTROL ED NAV: NORMAL
GLUCOSE UR STRIP-MCNC: NEGATIVE MG/DL
HGB UR QL STRIP.AUTO: ABNORMAL
HYALINE CASTS #/AREA URNS LPF: ABNORMAL /LPF
KETONES UR STRIP-MCNC: ABNORMAL MG/DL
LEUKOCYTE ESTERASE UR QL STRIP: ABNORMAL
MUCOUS THREADS UR QL AUTO: ABNORMAL
NITRITE UR QL STRIP: NEGATIVE
NON-SQ EPI CELLS URNS QL MICRO: ABNORMAL /HPF
PH UR STRIP.AUTO: 6 [PH]
PROT UR STRIP-MCNC: ABNORMAL MG/DL
RBC #/AREA URNS AUTO: ABNORMAL /HPF
SP GR UR STRIP.AUTO: >=1.03 (ref 1–1.03)
UROBILINOGEN UR QL STRIP.AUTO: 1 E.U./DL
WBC #/AREA URNS AUTO: ABNORMAL /HPF

## 2022-09-07 PROCEDURE — 99284 EMERGENCY DEPT VISIT MOD MDM: CPT | Performed by: STUDENT IN AN ORGANIZED HEALTH CARE EDUCATION/TRAINING PROGRAM

## 2022-09-07 PROCEDURE — 99283 EMERGENCY DEPT VISIT LOW MDM: CPT

## 2022-09-07 PROCEDURE — 87077 CULTURE AEROBIC IDENTIFY: CPT | Performed by: STUDENT IN AN ORGANIZED HEALTH CARE EDUCATION/TRAINING PROGRAM

## 2022-09-07 PROCEDURE — 81001 URINALYSIS AUTO W/SCOPE: CPT | Performed by: STUDENT IN AN ORGANIZED HEALTH CARE EDUCATION/TRAINING PROGRAM

## 2022-09-07 PROCEDURE — 81003 URINALYSIS AUTO W/O SCOPE: CPT | Performed by: STUDENT IN AN ORGANIZED HEALTH CARE EDUCATION/TRAINING PROGRAM

## 2022-09-07 PROCEDURE — 81025 URINE PREGNANCY TEST: CPT | Performed by: STUDENT IN AN ORGANIZED HEALTH CARE EDUCATION/TRAINING PROGRAM

## 2022-09-07 PROCEDURE — 87086 URINE CULTURE/COLONY COUNT: CPT | Performed by: STUDENT IN AN ORGANIZED HEALTH CARE EDUCATION/TRAINING PROGRAM

## 2022-09-07 PROCEDURE — 87186 SC STD MICRODIL/AGAR DIL: CPT | Performed by: STUDENT IN AN ORGANIZED HEALTH CARE EDUCATION/TRAINING PROGRAM

## 2022-09-07 RX ORDER — NITROFURANTOIN 25; 75 MG/1; MG/1
100 CAPSULE ORAL 2 TIMES DAILY
Qty: 9 CAPSULE | Refills: 0 | Status: SHIPPED | OUTPATIENT
Start: 2022-09-07 | End: 2022-09-12

## 2022-09-07 RX ORDER — NITROFURANTOIN 25; 75 MG/1; MG/1
100 CAPSULE ORAL ONCE
Status: COMPLETED | OUTPATIENT
Start: 2022-09-07 | End: 2022-09-07

## 2022-09-07 RX ADMIN — NITROFURANTOIN (MONOHYDRATE/MACROCRYSTALS) 100 MG: 75; 25 CAPSULE ORAL at 00:54

## 2022-09-07 NOTE — DISCHARGE INSTRUCTIONS
Please take all 5 days of the antibiotic macrobid for your UTI  Please follow up with your PCP to ensure resolution of symptoms  Please return to the ED with any new or worsening symptoms

## 2022-09-07 NOTE — ED PROVIDER NOTES
History  Chief Complaint   Patient presents with    Possible UTI     Pt presents pain on urination since yesterday, as well urination frequency and cramping during urinating  Pertinent PMHx:asthma, obesity  PSH: gastrectomy, cholecystectomy    Cinthya Pittman is a 39year old female who presents to the ED with dysuria, urinary frequency/urgency, and suprapubic cramping during urination which began yesterday evening  Per review of records, patient recently treated for H  Pylori s/p gastrectomy, patient states completed treatment course last month  Patient denies fever/chills, currently abdominal pain, n/v/d, back pain, flank pain, vaginal pain, vaginal discharge, vaginal bleeding, SOB, CP, or any other concerns at this time  History provided by:  Patient and medical records   used: No        Prior to Admission Medications   Prescriptions Last Dose Informant Patient Reported? Taking? albuterol (2 5 mg/3 mL) 0 083 % nebulizer solution  Self Yes No   Sig: INHALE 1 VIAL VIA NEBULIZER EVERY 4 HOURS AS NEEDED   enoxaparin (Lovenox) 40 mg/0 4 mL   No No   Sig: Inject 0 4 mL (40 mg total) under the skin in the morning for 8 days   fexofenadine-pseudoephedrine (ALLEGRA-D 24) 180-240 MG per 24 hr tablet  Self No No   Sig: Take 1 tablet by mouth daily   fluticasone-vilanterol (Breo Ellipta) 200-25 MCG/INH inhaler  Self No No   Sig: Inhale 1 puff daily Rinse mouth after use     ipratropium (ATROVENT) 0 02 % nebulizer solution   No No   Sig: Take 2 5 mL (0 5 mg total) by nebulization 3 (three) times a day   Patient taking differently: Take 0 5 mg by nebulization every 6 (six) hours as needed   montelukast (SINGULAIR) 10 mg tablet   No No   Sig: Take 1 tablet (10 mg total) by mouth daily at bedtime   omeprazole (PriLOSEC) 20 mg delayed release capsule  Self No No   Sig: Take 1 capsule (20 mg total) by mouth 2 (two) times a day   omeprazole (PriLOSEC) 20 mg delayed release capsule   No No   Sig: Take 1 capsule (20 mg total) by mouth 2 (two) times a day   ondansetron (ZOFRAN-ODT) 4 mg disintegrating tablet  Self No No   Sig: Take 1 tablet (4 mg total) by mouth every 8 (eight) hours as needed for nausea   ondansetron (Zofran ODT) 4 mg disintegrating tablet  Self No No   Sig: Take 1 tablet (4 mg total) by mouth every 6 (six) hours as needed for nausea or vomiting   oxyCODONE (Roxicodone) 5 immediate release tablet  Self No No   Sig: Take 1 tablet (5 mg total) by mouth every 4 (four) hours as needed for moderate pain Max Daily Amount: 30 mg   Patient not taking: Reported on 7/5/2022   potassium chloride (K-DUR,KLOR-CON) 10 mEq tablet  Self No No   Sig: Take 1 tablet (10 mEq total) by mouth 2 (two) times a day   Patient not taking: No sig reported      Facility-Administered Medications Last Administration Doses Remaining   etonogestrel (NEXPLANON) subdermal implant 68 mg None recorded 1          Past Medical History:   Diagnosis Date    Allergic rhinitis     Chronic pain     COVID-19 03/26/2021    Depression     pt states is resolved at this time    Insomnia     Mild intermittent asthma with exacerbation 03/23/2021    Obesity, Class III, BMI 40-49 9 (morbid obesity) ([facility] [address] ) 07/02/2012    Preoperative clearance 1/27/2022    Scoliosis     Seasonal allergies        Past Surgical History:   Procedure Laterality Date    ABCESS DRAINAGE      Incision and dranage of skin abscess    CHOLECYSTECTOMY      MS LAP, NESS RESTRICT PROC, LONGITUDINAL GASTRECTOMY N/A 6/14/2022    Procedure: LAPAROSCOPIC SLEEVE GASTRECTOMY WITH ROBOTICS& INTRAOP EGD;  Surgeon: Trey Garcia MD;  Location: [facility] OR;  Service: Bariatrics    UPPER GASTROINTESTINAL ENDOSCOPY         Family History   Problem Relation Age of Onset    Other Father         Cardiac disorder    Hypertension Father     Diabetes Maternal Grandmother     Diabetes Mother     Hypertension Mother     Cervical cancer Mother      I have reviewed and agree with the history as documented  E-Cigarette/Vaping    E-Cigarette Use Never User      E-Cigarette/Vaping Substances    Nicotine No     THC No     CBD No     Flavoring No     Other No     Unknown No      Social History     Tobacco Use    Smoking status: Former Smoker    Smokeless tobacco: Never Used    Tobacco comment: quit 10 yrs ago   Vaping Use    Vaping Use: Never used   Substance Use Topics    Alcohol use: Never    Drug use: No       Review of Systems   Constitutional: Negative for chills and fever  HENT: Negative for ear pain, sore throat and trouble swallowing  Eyes: Negative for pain and visual disturbance  Respiratory: Negative for cough and shortness of breath  Cardiovascular: Negative for chest pain and palpitations  Gastrointestinal: Positive for abdominal pain (see HPI)  Negative for diarrhea, nausea and vomiting  Genitourinary: Positive for dysuria, frequency and urgency  Negative for flank pain, hematuria, pelvic pain, vaginal bleeding, vaginal discharge and vaginal pain  Musculoskeletal: Negative for arthralgias, back pain and gait problem  Skin: Negative for color change and rash  Neurological: Negative for syncope and light-headedness  All other systems reviewed and are negative  Physical Exam  Physical Exam  Vitals and nursing note reviewed  Constitutional:       General: She is not in acute distress  Appearance: Normal appearance  She is well-developed  She is not ill-appearing  Comments: Well appearing, speaking in full sentences, resting comfortably on stretcher, VSS   HENT:      Head: Normocephalic and atraumatic  Right Ear: External ear normal       Left Ear: External ear normal       Nose: Nose normal  No congestion or rhinorrhea  Mouth/Throat:      Mouth: Mucous membranes are moist    Eyes:      General:         Right eye: No discharge  Left eye: No discharge        Conjunctiva/sclera: Conjunctivae normal    Cardiovascular: Rate and Rhythm: Normal rate and regular rhythm  Heart sounds: No murmur heard  No friction rub  No gallop  Pulmonary:      Effort: Pulmonary effort is normal  No respiratory distress  Breath sounds: Normal breath sounds  No stridor  No wheezing, rhonchi or rales  Abdominal:      General: Abdomen is flat  Bowel sounds are normal  There is no distension  Palpations: Abdomen is soft  Tenderness: There is no abdominal tenderness  There is no right CVA tenderness, left CVA tenderness, guarding or rebound  Genitourinary:     Comments: Deferred  Musculoskeletal:         General: No deformity or signs of injury  Cervical back: Normal range of motion and neck supple  Skin:     General: Skin is warm and dry  Capillary Refill: Capillary refill takes less than 2 seconds  Findings: No bruising, erythema or rash  Neurological:      General: No focal deficit present  Mental Status: She is alert and oriented to person, place, and time     Psychiatric:         Mood and Affect: Mood normal          Vital Signs  ED Triage Vitals   Temperature Pulse Respirations Blood Pressure SpO2   09/07/22 0029 09/07/22 0028 09/07/22 0028 09/07/22 0029 09/07/22 0028   97 8 °F (36 6 °C) 90 18 150/92 97 %      Temp Source Heart Rate Source Patient Position - Orthostatic VS BP Location FiO2 (%)   09/07/22 0029 09/07/22 0028 09/07/22 0028 09/07/22 0028 --   Oral Monitor Sitting Left arm       Pain Score       --                  Vitals:    09/07/22 0028 09/07/22 0029   BP:  150/92   Pulse: 90    Patient Position - Orthostatic VS: Sitting          Visual Acuity      ED Medications  Medications   nitrofurantoin (MACROBID) extended-release capsule 100 mg (100 mg Oral Given 9/7/22 0054)       Diagnostic Studies  Results Reviewed     Procedure Component Value Units Date/Time    Urine Microscopic [[id number]]  (Abnormal) Collected: 09/07/22 0033    Lab Status: Final result Specimen: Urine, Clean Catch Updated: 09/07/22 0054     RBC, UA Innumerable /hpf      WBC, UA Innumerable /hpf      Epithelial Cells Innumerable /hpf      Bacteria, UA Innumerable /hpf      Hyaline Casts, UA 0-1 /lpf      MUCUS THREADS Innumerable    Urine culture [[id number]] Collected: 09/07/22 0033    Lab Status: In process Specimen: Urine, Clean Catch Updated: 09/07/22 0054    UA w Reflex to Microscopic w Reflex to Culture [[id number]]  (Abnormal) Collected: 09/07/22 0033    Lab Status: Final result Specimen: Urine, Clean Catch Updated: 09/07/22 0045     Color, UA Yellow     Clarity, UA Cloudy     Specific Gravity, UA >=1 030     pH, UA 6 0     Leukocytes, UA 1+     Nitrite, UA Negative     Protein, UA 1+ mg/dl      Glucose, UA Negative mg/dl      Ketones, UA Trace mg/dl      Urobilinogen, UA 1 0 E U /dl      Bilirubin, UA 2+     Occult Blood, UA 3+    POCT pregnancy, urine [[id number]]  (Normal) Resulted: 09/07/22 0040    Lab Status: Final result Updated: 09/07/22 0040     EXT PREG TEST UR (Ref: Negative) Negative     Control Valid                 No orders to display              Procedures  Procedures         ED Course                               SBIRT 20yo+    Flowsheet Row Most Recent Value   SBIRT (23 yo +)    In order to provide better care to our patients, we are screening all of our patients for alcohol and drug use  Would it be okay to ask you these screening questions? No Filed at: 09/07/2022 0031                    MDM  Number of Diagnoses or Management Options  UTI (urinary tract infection)  Diagnosis management comments: UA with UTI  No back pain or CVA TTP to suggest pyelonephritis  Denies vaginal complaints to suggest STD, PID, vaginitis  Pregnancy negative  Patient well appearing, VSS, stable for discharge      -macrobid x 5 days  -f/u with PCP  -strict ED return precautions discussed    Results discussed with patient, who verbalized understanding and agreement with the management plan   Strict ED return instructions were discussed at bedside and all questions were answered  Prior to discharge, I provided both verbal and written instructions of the management plan and the signs and symptoms that should prompt the patient to return to the ED  All questions were answered and the patient was comfortable with the plan of care and discharged home  The patient agrees to return to the Emergency Department for concerns and/or progression of illness  Amount and/or Complexity of Data Reviewed  Clinical lab tests: ordered and reviewed    Patient Progress  Patient progress: stable      Disposition  Final diagnoses:   UTI (urinary tract infection)     Time reflects when diagnosis was documented in both MDM as applicable and the Disposition within this note     Time User Action Codes Description Comment    9/7/2022 12:49 AM Raad Roberts Add [N39 0] UTI (urinary tract infection)       ED Disposition     ED Disposition   Discharge    Condition   Stable    Date/Time   Wed Sep 7, 2022 12:52 AM    Comment   [id number] discharge to home/self care                 Follow-up Information     Follow up With Specialties Details Why Contact Info Additional Information    Helio Escoto DO Internal Medicine Schedule an appointment as soon as possible for a visit in 3 days  [address]  [facility] [address] [facility], [facility] [address] [facility] [address]       [address] [facility] 114 Emergency Department Emergency Medicine  If symptoms worsen [address] [facility],[address] [id number]  [address] [facility] Emergency Department, [address], [address] [place] [address] Rd          Discharge Medication List as of 9/7/2022 12:52 AM      START taking these medications    Details   nitrofurantoin (MACROBID) 100 mg capsule Take 1 capsule (100 mg total) by mouth 2 (two) times a day for 5 days, Starting Wed 9/7/2022, Until Mon 9/12/2022, Normal         CONTINUE these medications which have NOT CHANGED    Details   albuterol (2 5 mg/3 mL) 0  083 % nebulizer solution INHALE 1 VIAL VIA NEBULIZER EVERY 4 HOURS AS NEEDED, Historical Med      enoxaparin (Lovenox) 40 mg/0 4 mL Inject 0 4 mL (40 mg total) under the skin in the morning for 8 days, Starting Sun 6/19/2022, Until Mon 6/27/2022, Normal      fexofenadine-pseudoephedrine (ALLEGRA-D 24) 180-240 MG per 24 hr tablet Take 1 tablet by mouth daily, Starting Thu 6/16/2022, Until Wed 9/14/2022, Normal      fluticasone-vilanterol (Breo Ellipta) 200-25 MCG/INH inhaler Inhale 1 puff daily Rinse mouth after use , Starting Wed 3/16/2022, Until Thu 6/23/2022, Normal      ipratropium (ATROVENT) 0 02 % nebulizer solution Take 2 5 mL (0 5 mg total) by nebulization 3 (three) times a day, Starting Sun 10/24/2021, Until Wed 6/1/2022, Normal      montelukast (SINGULAIR) 10 mg tablet Take 1 tablet (10 mg total) by mouth daily at bedtime, Starting Tue 12/21/2021, Until Thu 6/23/2022, Normal      omeprazole (PriLOSEC) 20 mg delayed release capsule Take 1 capsule (20 mg total) by mouth 2 (two) times a day, Starting Wed 7/6/2022, Until Tue 10/4/2022, Normal      !! ondansetron (Zofran ODT) 4 mg disintegrating tablet Take 1 tablet (4 mg total) by mouth every 6 (six) hours as needed for nausea or vomiting, Starting Fri 7/1/2022, Normal      !! ondansetron (ZOFRAN-ODT) 4 mg disintegrating tablet Take 1 tablet (4 mg total) by mouth every 8 (eight) hours as needed for nausea, Starting Mon 6/20/2022, Normal      oxyCODONE (Roxicodone) 5 immediate release tablet Take 1 tablet (5 mg total) by mouth every 4 (four) hours as needed for moderate pain Max Daily Amount: 30 mg, Starting Wed 6/15/2022, Normal      potassium chloride (K-DUR,KLOR-CON) 10 mEq tablet Take 1 tablet (10 mEq total) by mouth 2 (two) times a day, Starting Mon 6/20/2022, Normal       !! - Potential duplicate medications found  Please discuss with provider  No discharge procedures on file      PDMP Review       Value Time User    PDMP Reviewed  Yes 5/31/2022 8:55 AM Abhi Dale PA-C          ED Provider  Electronically Signed by           Varghese Floyd PA-C  09/07/22 [id number]

## 2022-09-09 DIAGNOSIS — N30.00 ACUTE CYSTITIS WITHOUT HEMATURIA: Primary | ICD-10-CM

## 2022-09-09 LAB
BACTERIA UR CULT: ABNORMAL
BACTERIA UR CULT: ABNORMAL

## 2022-09-09 RX ORDER — CEPHALEXIN 500 MG/1
500 CAPSULE ORAL EVERY 12 HOURS SCHEDULED
Qty: 14 CAPSULE | Refills: 0 | Status: SHIPPED | OUTPATIENT
Start: 2022-09-09 | End: 2022-09-16

## 2022-09-09 NOTE — RESULT ENCOUNTER NOTE
Called pt to informed of 2 bacteria in urine  Resistance to macrobid in one  Will switch to keflex to cover both bacteria  Pt just moved to [facility] yesterday, rx sent to her local pharmacy electronically

## 2022-09-22 ENCOUNTER — TELEPHONE (OUTPATIENT)
Dept: BARIATRICS | Facility: CLINIC | Age: 37
End: 2022-09-22

## 2022-09-22 NOTE — TELEPHONE ENCOUNTER
Pt called to cancel their 2nd PO, due to [place]  Let pt know I will cancel appt  Let pt know she can call to request her records when she needs them

## 2024-08-22 NOTE — ASSESSMENT & PLAN NOTE
LISAI, Mild anemia 10.7/32.2. I cleared for surgery and pt is to start iron supplement. Harmony   Likely contributed to pain with nausea and vomiting  Continue to control contributing factors per primary team  She is not maintained on any antihypertensive regimen prior to admission

## (undated) DEVICE — TROCARS: Brand: KII® OPTICAL ACCESS SYSTEM

## (undated) DEVICE — STAPLER 60: Brand: SUREFORM

## (undated) DEVICE — SEAL

## (undated) DEVICE — STAPLER 60 RELOAD BLUE: Brand: SUREFORM

## (undated) DEVICE — WEBRIL 6 IN UNSTERILE

## (undated) DEVICE — SCD SEQUENTIAL COMPRESSION COMFORT SLEEVE MEDIUM KNEE LENGTH: Brand: KENDALL SCD

## (undated) DEVICE — GLOVE SRG BIOGEL 8

## (undated) DEVICE — ARM DRAPE

## (undated) DEVICE — ENDOPATH PNEUMONEEDLE INSUFFLATION NEEDLES WITH LUER LOCK CONNECTORS 150MM: Brand: ENDOPATH

## (undated) DEVICE — PLUMEPEN PRO 10FT

## (undated) DEVICE — VISIGI 3D®  CALIBRATION SYSTEM  SIZE 36FR SLEEVE/STD: Brand: BOEHRINGER® VISIGI 3D™ SLEEVE GASTRECTOMY CALIBRATION SYSTEM, SIZE 36FR

## (undated) DEVICE — TROCAR: Brand: KII FIOS FIRST ENTRY

## (undated) DEVICE — VIOLET BRAIDED (POLYGLACTIN 910), SYNTHETIC ABSORBABLE SUTURE: Brand: COATED VICRYL

## (undated) DEVICE — 2000CC GUARDIAN II: Brand: GUARDIAN

## (undated) DEVICE — VIAL DECANTER

## (undated) DEVICE — [HIGH FLOW INSUFFLATOR,  DO NOT USE IF PACKAGE IS DAMAGED,  KEEP DRY,  KEEP AWAY FROM SUNLIGHT,  PROTECT FROM HEAT AND RADIOACTIVE SOURCES.]: Brand: PNEUMOSURE

## (undated) DEVICE — STAPLER 60 RELOAD BLACK: Brand: SUREFORM

## (undated) DEVICE — CANNULA SEAL

## (undated) DEVICE — CADIERE FORCEPS: Brand: ENDOWRIST

## (undated) DEVICE — 3000CC GUARDIAN II: Brand: GUARDIAN

## (undated) DEVICE — KIT, ROBOTIC BARIATRIC: Brand: CARDINAL HEALTH

## (undated) DEVICE — SUT MONOCRYL 4-0 PS-2 27 IN Y426H

## (undated) DEVICE — BAG DECANTER

## (undated) DEVICE — MEGA SUTURECUT ND: Brand: ENDOWRIST

## (undated) DEVICE — COLUMN DRAPE

## (undated) DEVICE — ADHESIVE SKIN CLSR DERMABOND NX

## (undated) DEVICE — VESSEL SEALER EXTEND: Brand: ENDOWRIST